# Patient Record
Sex: MALE | Race: WHITE | Employment: OTHER | URBAN - METROPOLITAN AREA
[De-identification: names, ages, dates, MRNs, and addresses within clinical notes are randomized per-mention and may not be internally consistent; named-entity substitution may affect disease eponyms.]

---

## 2019-10-08 ENCOUNTER — HOSPITAL ENCOUNTER (OUTPATIENT)
Dept: GENERAL RADIOLOGY | Age: 84
Discharge: HOME | End: 2019-10-08
Attending: INTERNAL MEDICINE
Payer: MEDICARE

## 2019-10-08 DIAGNOSIS — R06.02 SHORTNESS OF BREATH: ICD-10-CM

## 2019-10-08 PROCEDURE — 71046 X-RAY EXAM CHEST 2 VIEWS: CPT

## 2019-11-27 ENCOUNTER — APPOINTMENT (OUTPATIENT)
Dept: GENERAL RADIOLOGY | Age: 84
DRG: 602 | End: 2019-11-27
Attending: EMERGENCY MEDICINE
Payer: MEDICARE

## 2019-11-27 ENCOUNTER — APPOINTMENT (OUTPATIENT)
Dept: CT IMAGING | Age: 84
DRG: 602 | End: 2019-11-27
Attending: EMERGENCY MEDICINE
Payer: MEDICARE

## 2019-11-27 ENCOUNTER — HOSPITAL ENCOUNTER (INPATIENT)
Age: 84
LOS: 17 days | Discharge: NURSING, REHAB OR LONG-TERM CARE | DRG: 602 | End: 2019-12-14
Attending: EMERGENCY MEDICINE | Admitting: HOSPITALIST
Payer: MEDICARE

## 2019-11-27 DIAGNOSIS — Z71.89 ADVANCED CARE PLANNING/COUNSELING DISCUSSION: ICD-10-CM

## 2019-11-27 DIAGNOSIS — M25.421 EFFUSION OF RIGHT ELBOW: ICD-10-CM

## 2019-11-27 DIAGNOSIS — G93.40 ACUTE ENCEPHALOPATHY: Primary | ICD-10-CM

## 2019-11-27 DIAGNOSIS — R53.81 DEBILITY: ICD-10-CM

## 2019-11-27 DIAGNOSIS — R41.82 ALTERED MENTAL STATUS, UNSPECIFIED ALTERED MENTAL STATUS TYPE: ICD-10-CM

## 2019-11-27 DIAGNOSIS — Z71.89 GOALS OF CARE, COUNSELING/DISCUSSION: ICD-10-CM

## 2019-11-27 PROBLEM — L03.113 CELLULITIS OF RIGHT ARM: Status: ACTIVE | Noted: 2019-11-27

## 2019-11-27 LAB
ALBUMIN SERPL-MCNC: 3.3 G/DL (ref 3.5–5)
ALBUMIN/GLOB SERPL: 0.9 {RATIO} (ref 1.1–2.2)
ALP SERPL-CCNC: 82 U/L (ref 45–117)
ALT SERPL-CCNC: 22 U/L (ref 12–78)
ANION GAP SERPL CALC-SCNC: 5 MMOL/L (ref 5–15)
APPEARANCE FLD: ABNORMAL
APPEARANCE UR: ABNORMAL
AST SERPL-CCNC: 18 U/L (ref 15–37)
BACTERIA URNS QL MICRO: NEGATIVE /HPF
BASOPHILS # BLD: 0 K/UL (ref 0–0.1)
BASOPHILS NFR BLD: 0 % (ref 0–1)
BILIRUB SERPL-MCNC: 0.9 MG/DL (ref 0.2–1)
BILIRUB UR QL: NEGATIVE
BODY FLD TYPE: NORMAL
BUN SERPL-MCNC: 21 MG/DL (ref 6–20)
BUN/CREAT SERPL: 19 (ref 12–20)
CALCIUM SERPL-MCNC: 9.8 MG/DL (ref 8.5–10.1)
CHLORIDE SERPL-SCNC: 98 MMOL/L (ref 97–108)
CO2 SERPL-SCNC: 34 MMOL/L (ref 21–32)
COLOR FLD: ABNORMAL
COLOR UR: ABNORMAL
CREAT SERPL-MCNC: 1.13 MG/DL (ref 0.7–1.3)
CRP SERPL-MCNC: 8.41 MG/DL (ref 0–0.6)
CRYSTALS FLD MICRO: NORMAL
DIFFERENTIAL METHOD BLD: ABNORMAL
EOSINOPHIL # BLD: 0 K/UL (ref 0–0.4)
EOSINOPHIL NFR BLD: 0 % (ref 0–7)
EPITH CASTS URNS QL MICRO: ABNORMAL /LPF
ERYTHROCYTE [DISTWIDTH] IN BLOOD BY AUTOMATED COUNT: 13.2 % (ref 11.5–14.5)
ERYTHROCYTE [SEDIMENTATION RATE] IN BLOOD: 18 MM/HR (ref 0–20)
GLOBULIN SER CALC-MCNC: 3.6 G/DL (ref 2–4)
GLUCOSE SERPL-MCNC: 122 MG/DL (ref 65–100)
GLUCOSE UR STRIP.AUTO-MCNC: NEGATIVE MG/DL
HCT VFR BLD AUTO: 43.3 % (ref 36.6–50.3)
HGB BLD-MCNC: 13.4 G/DL (ref 12.1–17)
HGB UR QL STRIP: ABNORMAL
HYALINE CASTS URNS QL MICRO: ABNORMAL /LPF (ref 0–5)
IMM GRANULOCYTES # BLD AUTO: 0 K/UL (ref 0–0.04)
IMM GRANULOCYTES NFR BLD AUTO: 0 % (ref 0–0.5)
INR BLD: 2.7 (ref 0.9–1.2)
KETONES UR QL STRIP.AUTO: ABNORMAL MG/DL
LACTATE BLD-SCNC: 1.2 MMOL/L (ref 0.4–2)
LEUKOCYTE ESTERASE UR QL STRIP.AUTO: ABNORMAL
LYMPHOCYTES # BLD: 1 K/UL (ref 0.8–3.5)
LYMPHOCYTES NFR BLD: 8 % (ref 12–49)
LYMPHOCYTES NFR FLD: 2 %
MCH RBC QN AUTO: 30.5 PG (ref 26–34)
MCHC RBC AUTO-ENTMCNC: 30.9 G/DL (ref 30–36.5)
MCV RBC AUTO: 98.6 FL (ref 80–99)
MONOCYTES # BLD: 1.4 K/UL (ref 0–1)
MONOCYTES NFR BLD: 10 % (ref 5–13)
NEUTROPHILS NFR FLD: 98 %
NEUTS SEG # BLD: 11 K/UL (ref 1.8–8)
NEUTS SEG NFR BLD: 82 % (ref 32–75)
NITRITE UR QL STRIP.AUTO: NEGATIVE
NRBC # BLD: 0 K/UL (ref 0–0.01)
NRBC BLD-RTO: 0 PER 100 WBC
NUC CELL # FLD: ABNORMAL /CU MM
PH UR STRIP: 6 [PH] (ref 5–8)
PLATELET # BLD AUTO: 219 K/UL (ref 150–400)
PMV BLD AUTO: 10.5 FL (ref 8.9–12.9)
POTASSIUM SERPL-SCNC: 4 MMOL/L (ref 3.5–5.1)
PROT SERPL-MCNC: 6.9 G/DL (ref 6.4–8.2)
PROT UR STRIP-MCNC: 100 MG/DL
RBC # BLD AUTO: 4.39 M/UL (ref 4.1–5.7)
RBC # FLD: >100 /CU MM
RBC #/AREA URNS HPF: >100 /HPF (ref 0–5)
SODIUM SERPL-SCNC: 137 MMOL/L (ref 136–145)
SP GR UR REFRACTOMETRY: 1.02 (ref 1–1.03)
SPECIMEN SOURCE FLD: ABNORMAL
TROPONIN I SERPL-MCNC: <0.05 NG/ML
UROBILINOGEN UR QL STRIP.AUTO: 1 EU/DL (ref 0.2–1)
WBC # BLD AUTO: 13.4 K/UL (ref 4.1–11.1)
WBC URNS QL MICRO: >100 /HPF (ref 0–4)

## 2019-11-27 PROCEDURE — 99285 EMERGENCY DEPT VISIT HI MDM: CPT

## 2019-11-27 PROCEDURE — 74011000250 HC RX REV CODE- 250: Performed by: PHYSICIAN ASSISTANT

## 2019-11-27 PROCEDURE — 74011250636 HC RX REV CODE- 250/636: Performed by: HOSPITALIST

## 2019-11-27 PROCEDURE — 84484 ASSAY OF TROPONIN QUANT: CPT

## 2019-11-27 PROCEDURE — 51701 INSERT BLADDER CATHETER: CPT

## 2019-11-27 PROCEDURE — 83605 ASSAY OF LACTIC ACID: CPT

## 2019-11-27 PROCEDURE — 96365 THER/PROPH/DIAG IV INF INIT: CPT

## 2019-11-27 PROCEDURE — 0R9L3ZX DRAINAGE OF RIGHT ELBOW JOINT, PERCUTANEOUS APPROACH, DIAGNOSTIC: ICD-10-PCS | Performed by: ORTHOPAEDIC SURGERY

## 2019-11-27 PROCEDURE — 73080 X-RAY EXAM OF ELBOW: CPT

## 2019-11-27 PROCEDURE — 36415 COLL VENOUS BLD VENIPUNCTURE: CPT

## 2019-11-27 PROCEDURE — 65270000029 HC RM PRIVATE

## 2019-11-27 PROCEDURE — 87040 BLOOD CULTURE FOR BACTERIA: CPT

## 2019-11-27 PROCEDURE — 74011000258 HC RX REV CODE- 258: Performed by: EMERGENCY MEDICINE

## 2019-11-27 PROCEDURE — 85025 COMPLETE CBC W/AUTO DIFF WBC: CPT

## 2019-11-27 PROCEDURE — 74011250637 HC RX REV CODE- 250/637: Performed by: HOSPITALIST

## 2019-11-27 PROCEDURE — 86140 C-REACTIVE PROTEIN: CPT

## 2019-11-27 PROCEDURE — 87205 SMEAR GRAM STAIN: CPT

## 2019-11-27 PROCEDURE — 87086 URINE CULTURE/COLONY COUNT: CPT

## 2019-11-27 PROCEDURE — 71046 X-RAY EXAM CHEST 2 VIEWS: CPT

## 2019-11-27 PROCEDURE — 70450 CT HEAD/BRAIN W/O DYE: CPT

## 2019-11-27 PROCEDURE — 74011250636 HC RX REV CODE- 250/636: Performed by: EMERGENCY MEDICINE

## 2019-11-27 PROCEDURE — 74011000250 HC RX REV CODE- 250: Performed by: EMERGENCY MEDICINE

## 2019-11-27 PROCEDURE — 85652 RBC SED RATE AUTOMATED: CPT

## 2019-11-27 PROCEDURE — 93005 ELECTROCARDIOGRAM TRACING: CPT

## 2019-11-27 PROCEDURE — 80053 COMPREHEN METABOLIC PANEL: CPT

## 2019-11-27 PROCEDURE — 89050 BODY FLUID CELL COUNT: CPT

## 2019-11-27 PROCEDURE — 85610 PROTHROMBIN TIME: CPT

## 2019-11-27 PROCEDURE — 20605 DRAIN/INJ JOINT/BURSA W/O US: CPT

## 2019-11-27 PROCEDURE — 89060 EXAM SYNOVIAL FLUID CRYSTALS: CPT

## 2019-11-27 PROCEDURE — 81001 URINALYSIS AUTO W/SCOPE: CPT

## 2019-11-27 RX ORDER — THERA TABS 400 MCG
1 TAB ORAL DAILY
COMMUNITY

## 2019-11-27 RX ORDER — ALBUTEROL SULFATE 0.83 MG/ML
2.5 SOLUTION RESPIRATORY (INHALATION)
COMMUNITY

## 2019-11-27 RX ORDER — METOPROLOL TARTRATE 50 MG/1
50 TABLET ORAL 2 TIMES DAILY
Status: DISCONTINUED | OUTPATIENT
Start: 2019-11-27 | End: 2019-12-07

## 2019-11-27 RX ORDER — FUROSEMIDE 20 MG/1
20 TABLET ORAL DAILY
Status: ON HOLD | COMMUNITY
End: 2019-12-13 | Stop reason: SDUPTHER

## 2019-11-27 RX ORDER — LANOLIN ALCOHOL/MO/W.PET/CERES
100 CREAM (GRAM) TOPICAL DAILY
COMMUNITY

## 2019-11-27 RX ORDER — SODIUM CHLORIDE 0.9 % (FLUSH) 0.9 %
5-40 SYRINGE (ML) INJECTION AS NEEDED
Status: DISCONTINUED | OUTPATIENT
Start: 2019-11-27 | End: 2019-12-14 | Stop reason: HOSPADM

## 2019-11-27 RX ORDER — ONDANSETRON 2 MG/ML
4 INJECTION INTRAMUSCULAR; INTRAVENOUS
Status: DISCONTINUED | OUTPATIENT
Start: 2019-11-27 | End: 2019-12-14 | Stop reason: HOSPADM

## 2019-11-27 RX ORDER — VANCOMYCIN 2 GRAM/500 ML IN 0.9 % SODIUM CHLORIDE INTRAVENOUS
2000 ONCE
Status: COMPLETED | OUTPATIENT
Start: 2019-11-27 | End: 2019-11-27

## 2019-11-27 RX ORDER — DILTIAZEM HYDROCHLORIDE 30 MG/1
30 TABLET, FILM COATED ORAL
COMMUNITY

## 2019-11-27 RX ORDER — LIDOCAINE HYDROCHLORIDE 20 MG/ML
JELLY TOPICAL ONCE
Status: COMPLETED | OUTPATIENT
Start: 2019-11-27 | End: 2019-11-27

## 2019-11-27 RX ORDER — LIDOCAINE HYDROCHLORIDE 10 MG/ML
5 INJECTION INFILTRATION; PERINEURAL ONCE
Status: COMPLETED | OUTPATIENT
Start: 2019-11-27 | End: 2019-11-27

## 2019-11-27 RX ORDER — ASPIRIN 81 MG/1
81 TABLET ORAL DAILY
Status: DISCONTINUED | OUTPATIENT
Start: 2019-11-28 | End: 2019-12-14 | Stop reason: HOSPADM

## 2019-11-27 RX ORDER — FUROSEMIDE 20 MG/1
20 TABLET ORAL DAILY
Status: DISCONTINUED | OUTPATIENT
Start: 2019-11-28 | End: 2019-12-07

## 2019-11-27 RX ORDER — ALBUTEROL SULFATE 0.83 MG/ML
2.5 SOLUTION RESPIRATORY (INHALATION)
Status: DISCONTINUED | OUTPATIENT
Start: 2019-11-27 | End: 2019-11-29

## 2019-11-27 RX ORDER — LISINOPRIL 5 MG/1
5 TABLET ORAL DAILY
Status: DISCONTINUED | OUTPATIENT
Start: 2019-11-28 | End: 2019-11-28

## 2019-11-27 RX ORDER — ATORVASTATIN CALCIUM 40 MG/1
40 TABLET, FILM COATED ORAL DAILY
COMMUNITY

## 2019-11-27 RX ORDER — LANOLIN ALCOHOL/MO/W.PET/CERES
1000 CREAM (GRAM) TOPICAL DAILY
COMMUNITY

## 2019-11-27 RX ORDER — ACETAMINOPHEN 325 MG/1
650 TABLET ORAL
COMMUNITY

## 2019-11-27 RX ORDER — LANOLIN ALCOHOL/MO/W.PET/CERES
100 CREAM (GRAM) TOPICAL DAILY
Status: DISCONTINUED | OUTPATIENT
Start: 2019-11-28 | End: 2019-12-14 | Stop reason: HOSPADM

## 2019-11-27 RX ORDER — ATORVASTATIN CALCIUM 40 MG/1
40 TABLET, FILM COATED ORAL DAILY
Status: DISCONTINUED | OUTPATIENT
Start: 2019-11-28 | End: 2019-12-14 | Stop reason: HOSPADM

## 2019-11-27 RX ORDER — ALBUTEROL SULFATE 90 UG/1
AEROSOL, METERED RESPIRATORY (INHALATION)
COMMUNITY

## 2019-11-27 RX ORDER — ACETAMINOPHEN 325 MG/1
650 TABLET ORAL
Status: DISCONTINUED | OUTPATIENT
Start: 2019-11-27 | End: 2019-12-14 | Stop reason: HOSPADM

## 2019-11-27 RX ORDER — VANCOMYCIN HYDROCHLORIDE
1250
Status: DISCONTINUED | OUTPATIENT
Start: 2019-11-28 | End: 2019-11-30

## 2019-11-27 RX ORDER — WARFARIN 4 MG/1
4 TABLET ORAL DAILY
COMMUNITY

## 2019-11-27 RX ORDER — METOPROLOL TARTRATE 50 MG/1
50 TABLET ORAL 2 TIMES DAILY
COMMUNITY

## 2019-11-27 RX ORDER — CHOLECALCIFEROL (VITAMIN D3) 125 MCG
5000 CAPSULE ORAL DAILY
COMMUNITY

## 2019-11-27 RX ORDER — DILTIAZEM HYDROCHLORIDE 30 MG/1
30 TABLET, FILM COATED ORAL
Status: DISCONTINUED | OUTPATIENT
Start: 2019-11-28 | End: 2019-12-07

## 2019-11-27 RX ORDER — SODIUM CHLORIDE 0.9 % (FLUSH) 0.9 %
5-40 SYRINGE (ML) INJECTION EVERY 8 HOURS
Status: DISCONTINUED | OUTPATIENT
Start: 2019-11-27 | End: 2019-12-14 | Stop reason: HOSPADM

## 2019-11-27 RX ORDER — ASPIRIN 81 MG/1
TABLET ORAL DAILY
COMMUNITY

## 2019-11-27 RX ORDER — LISINOPRIL 5 MG/1
TABLET ORAL DAILY
Status: ON HOLD | COMMUNITY
End: 2019-12-13 | Stop reason: SDUPTHER

## 2019-11-27 RX ADMIN — LIDOCAINE HYDROCHLORIDE: 20 JELLY TOPICAL at 18:37

## 2019-11-27 RX ADMIN — SODIUM CHLORIDE 1000 ML: 900 INJECTION, SOLUTION INTRAVENOUS at 17:34

## 2019-11-27 RX ADMIN — VANCOMYCIN HYDROCHLORIDE 2000 MG: 10 INJECTION, POWDER, LYOPHILIZED, FOR SOLUTION INTRAVENOUS at 21:50

## 2019-11-27 RX ADMIN — ACETAMINOPHEN 650 MG: 325 TABLET ORAL at 21:50

## 2019-11-27 RX ADMIN — LIDOCAINE HYDROCHLORIDE 5 ML: 10 INJECTION, SOLUTION INFILTRATION; PERINEURAL at 19:44

## 2019-11-27 RX ADMIN — METOPROLOL TARTRATE 50 MG: 50 TABLET, FILM COATED ORAL at 21:50

## 2019-11-27 RX ADMIN — CEFTRIAXONE 1 G: 1 INJECTION, POWDER, FOR SOLUTION INTRAMUSCULAR; INTRAVENOUS at 18:49

## 2019-11-27 RX ADMIN — Medication 10 ML: at 21:55

## 2019-11-27 NOTE — ED NOTES
Attempted to straight cath patient was unsuccessful. Son wishes that we wait an hour to see if patient is able to void.

## 2019-11-27 NOTE — ED PROVIDER NOTES
81 y.o. male with no significant past medical history who presents from nursing home via EMS with chief complaint of Altered Mental Status. Pt presents with AMS since 0730 this morning per EMS. Per EMS, Pt was more confused and not following his usual routine as noticed by his daytime nurse. Pt was sent to [address] [place] [address],[address], who diagnosed him with a UTI and cellulitis to the R arm, but referred Pt to the ED due to his AMS. Pt denies any fever, chills, nausea, vomiting, SOB, chest pain, abdominal pain, urinary symptoms, changes in BMs, weakness, or injury to his right arm. There are no other acute medical concerns at this time. PCP: Kyra Arteaga MD    Full history, physical exam, and ROS unable to be obtained due to:  confusion. Note written by Sally Sahu, as dictated by Aminta Ordonez MD 2:53 PM      The history is provided by the patient and the EMS personnel. The history is limited by the condition of the patient. No past medical history on file. No past surgical history on file. No family history on file.     Social History     Socioeconomic History    Marital status:      Spouse name: Not on file    Number of children: Not on file    Years of education: Not on file    Highest education level: Not on file   Occupational History    Not on file   Social Needs    Financial resource strain: Not on file    Food insecurity:     Worry: Not on file     Inability: Not on file    Transportation needs:     Medical: Not on file     Non-medical: Not on file   Tobacco Use    Smoking status: Not on file   Substance and Sexual Activity    Alcohol use: Not on file    Drug use: Not on file    Sexual activity: Not on file   Lifestyle    Physical activity:     Days per week: Not on file     Minutes per session: Not on file    Stress: Not on file   Relationships    Social connections:     Talks on phone: Not on file     Gets together: Not on file     Attends [employer] service: Not on file     Active member of club or organization: Not on file     Attends meetings of clubs or organizations: Not on file     Relationship status: Not on file    Intimate partner violence:     Fear of current or ex partner: Not on file     Emotionally abused: Not on file     Physically abused: Not on file     Forced sexual activity: Not on file   Other Topics Concern    Not on file   Social History Narrative    Not on file         ALLERGIES: Seroquel [quetiapine]    Review of Systems   Unable to perform ROS: Mental status change       There were no vitals filed for this visit. Physical Exam   Vital signs reviewed. Nursing notes reviewed.     Const:  No acute distress, well developed, well nourished  Head:  Atraumatic, normocephalic  Eyes:  PERRL, conjunctiva normal, no scleral icterus  Neck:  Supple, trachea midline  Cardiovascular:  RRR, no murmurs, no gallops, no rubs, mild bilateral pitting edema  Resp:  No resp distress, no increased work of breathing, no wheezes, no rhonchi, no rales,  Abd:  Soft, non-tender, non-distended, no rebound, no guarding, no CVA tenderness  MSK:  No pedal edema, normal ROM, swelling in right elbow and tenderness to palpation; no pain with ROM in right elbow, can extend right arm to 120 degrees  Neuro:  Alert and oriented x1 - knows name, no cranial nerve defect; 4+/5 strength in right upper extremity, 5/5 in left upper extremity and bilateral lower extremities  Skin:  Warm, dry, intact  Psych: normal mood and affect, behavior is normal, judgement and thought content is normal, word-finding difficulties  Note written by Sally Grider, as dictated by Gaye Grande MD 2:53 PM      MDM  Number of Diagnoses or Management Options  Acute encephalopathy:   Effusion of right elbow:      Amount and/or Complexity of Data Reviewed  Clinical lab tests: ordered and reviewed  Tests in the radiology section of CPT®: ordered and reviewed  Review and summarize past medical records: yes    Patient Progress  Patient progress: stable         Procedures  ED EKG interpretation:  Rhythm: atrial fib; and regular . Rate (approx.): 83; Axis: left axis deviation; ST/T wave: non-specific changes  Note written by Sally Grider, as dictated by Gaye Grande MD 3:17 PM    8:54 PM  Patient is being admitted to the hospital.  The results of their tests and reasons for their admission have been discussed with them and/or available family. They convey agreement and understanding for the need to be admitted and for their admission diagnosis. Mr Jessica Leo is an 55-year-old male who presents to the ER with he was diagnosed with probable UTI recently. He also has an effusion at his elbow. Concerned about possible septic arthritis. Orthopedics have evaluated him and tapped his elbow. I have started him on antibiotics for possible urinary tract infection. He is having difficulty giving us urine in the ER. We have tried unsuccessfully twice to do straight catheterization. He has refused any other further catheterizations.   He is to be evaluated for admission by the hospitalist.

## 2019-11-27 NOTE — ED NOTES
Attempted to straight cath patient again with coude and this RN and additional RN was not successful. MD made aware.

## 2019-11-27 NOTE — ED TRIAGE NOTES
Triage: Patient arrives via EMS with c/o AMS that was notice this morning at 0730. Staff member became more concerned when he wasn't following his normal routine. Patient is confused at baseline but appears more altered. Patient seen by dispatch [facility] and they recommended he be seen here due to the acute AMS. Patient was diagnosed with a UTI and cellulitis of the right arm by Dispatch [facility].

## 2019-11-28 LAB
ANION GAP SERPL CALC-SCNC: 3 MMOL/L (ref 5–15)
ATRIAL RATE: 326 BPM
BUN SERPL-MCNC: 17 MG/DL (ref 6–20)
BUN/CREAT SERPL: 19 (ref 12–20)
CALCIUM SERPL-MCNC: 9.3 MG/DL (ref 8.5–10.1)
CALCULATED R AXIS, ECG10: -39 DEGREES
CALCULATED T AXIS, ECG11: 44 DEGREES
CHLORIDE SERPL-SCNC: 102 MMOL/L (ref 97–108)
CO2 SERPL-SCNC: 34 MMOL/L (ref 21–32)
CREAT SERPL-MCNC: 0.91 MG/DL (ref 0.7–1.3)
DIAGNOSIS, 93000: NORMAL
GLUCOSE SERPL-MCNC: 91 MG/DL (ref 65–100)
INR PPP: 2.6 (ref 0.9–1.1)
POTASSIUM SERPL-SCNC: 4.2 MMOL/L (ref 3.5–5.1)
PROTHROMBIN TIME: 25 SEC (ref 9–11.1)
Q-T INTERVAL, ECG07: 366 MS
QRS DURATION, ECG06: 84 MS
QTC CALCULATION (BEZET), ECG08: 430 MS
SODIUM SERPL-SCNC: 139 MMOL/L (ref 136–145)
VENTRICULAR RATE, ECG03: 83 BPM

## 2019-11-28 PROCEDURE — 77030029684 HC NEB SM VOL KT MONA -A

## 2019-11-28 PROCEDURE — 65270000029 HC RM PRIVATE

## 2019-11-28 PROCEDURE — 74011000258 HC RX REV CODE- 258: Performed by: HOSPITALIST

## 2019-11-28 PROCEDURE — 80048 BASIC METABOLIC PNL TOTAL CA: CPT

## 2019-11-28 PROCEDURE — 74011250636 HC RX REV CODE- 250/636: Performed by: INTERNAL MEDICINE

## 2019-11-28 PROCEDURE — 74011000250 HC RX REV CODE- 250: Performed by: HOSPITALIST

## 2019-11-28 PROCEDURE — 85610 PROTHROMBIN TIME: CPT

## 2019-11-28 PROCEDURE — 74011250637 HC RX REV CODE- 250/637: Performed by: HOSPITALIST

## 2019-11-28 PROCEDURE — 94640 AIRWAY INHALATION TREATMENT: CPT

## 2019-11-28 PROCEDURE — 74011250637 HC RX REV CODE- 250/637: Performed by: INTERNAL MEDICINE

## 2019-11-28 PROCEDURE — 36415 COLL VENOUS BLD VENIPUNCTURE: CPT

## 2019-11-28 PROCEDURE — 74011250636 HC RX REV CODE- 250/636: Performed by: HOSPITALIST

## 2019-11-28 RX ORDER — TRAMADOL HYDROCHLORIDE 50 MG/1
50 TABLET ORAL
Status: DISCONTINUED | OUTPATIENT
Start: 2019-11-28 | End: 2019-12-14 | Stop reason: HOSPADM

## 2019-11-28 RX ORDER — SODIUM CHLORIDE 9 MG/ML
125 INJECTION, SOLUTION INTRAVENOUS CONTINUOUS
Status: DISCONTINUED | OUTPATIENT
Start: 2019-11-28 | End: 2019-12-03

## 2019-11-28 RX ORDER — HALOPERIDOL 5 MG/ML
5 INJECTION INTRAMUSCULAR ONCE
Status: COMPLETED | OUTPATIENT
Start: 2019-11-28 | End: 2019-11-28

## 2019-11-28 RX ORDER — HYDRALAZINE HYDROCHLORIDE 20 MG/ML
20 INJECTION INTRAMUSCULAR; INTRAVENOUS
Status: DISCONTINUED | OUTPATIENT
Start: 2019-11-28 | End: 2019-12-14 | Stop reason: HOSPADM

## 2019-11-28 RX ORDER — IPRATROPIUM BROMIDE AND ALBUTEROL SULFATE 2.5; .5 MG/3ML; MG/3ML
3 SOLUTION RESPIRATORY (INHALATION)
Status: COMPLETED | OUTPATIENT
Start: 2019-11-28 | End: 2019-11-28

## 2019-11-28 RX ORDER — AMLODIPINE BESYLATE 5 MG/1
5 TABLET ORAL DAILY
Status: DISCONTINUED | OUTPATIENT
Start: 2019-11-28 | End: 2019-12-08 | Stop reason: ALTCHOICE

## 2019-11-28 RX ORDER — LISINOPRIL 10 MG/1
10 TABLET ORAL DAILY
Status: DISCONTINUED | OUTPATIENT
Start: 2019-11-28 | End: 2019-12-14 | Stop reason: HOSPADM

## 2019-11-28 RX ORDER — FENTANYL CITRATE 50 UG/ML
25 INJECTION, SOLUTION INTRAMUSCULAR; INTRAVENOUS
Status: DISCONTINUED | OUTPATIENT
Start: 2019-11-28 | End: 2019-12-06

## 2019-11-28 RX ORDER — WARFARIN 4 MG/1
4 TABLET ORAL ONCE
Status: COMPLETED | OUTPATIENT
Start: 2019-11-28 | End: 2019-11-28

## 2019-11-28 RX ADMIN — WARFARIN SODIUM 4 MG: 4 TABLET ORAL at 17:02

## 2019-11-28 RX ADMIN — CEFTRIAXONE 1 G: 1 INJECTION, POWDER, FOR SOLUTION INTRAMUSCULAR; INTRAVENOUS at 18:58

## 2019-11-28 RX ADMIN — DILTIAZEM HYDROCHLORIDE 30 MG: 30 TABLET, FILM COATED ORAL at 08:46

## 2019-11-28 RX ADMIN — ATORVASTATIN CALCIUM 40 MG: 40 TABLET, FILM COATED ORAL at 08:43

## 2019-11-28 RX ADMIN — TRAMADOL HYDROCHLORIDE 50 MG: 50 TABLET, FILM COATED ORAL at 11:12

## 2019-11-28 RX ADMIN — Medication 100 MG: at 08:43

## 2019-11-28 RX ADMIN — Medication 10 ML: at 06:00

## 2019-11-28 RX ADMIN — FENTANYL CITRATE 25 MCG: 50 INJECTION INTRAMUSCULAR; INTRAVENOUS at 01:43

## 2019-11-28 RX ADMIN — METOPROLOL TARTRATE 50 MG: 50 TABLET, FILM COATED ORAL at 08:43

## 2019-11-28 RX ADMIN — VANCOMYCIN HYDROCHLORIDE 1250 MG: 10 INJECTION, POWDER, LYOPHILIZED, FOR SOLUTION INTRAVENOUS at 15:37

## 2019-11-28 RX ADMIN — AMLODIPINE BESYLATE 5 MG: 5 TABLET ORAL at 08:46

## 2019-11-28 RX ADMIN — IPRATROPIUM BROMIDE AND ALBUTEROL SULFATE 3 ML: .5; 3 SOLUTION RESPIRATORY (INHALATION) at 02:26

## 2019-11-28 RX ADMIN — SODIUM CHLORIDE 125 ML/HR: 900 INJECTION, SOLUTION INTRAVENOUS at 13:00

## 2019-11-28 RX ADMIN — METOPROLOL TARTRATE 50 MG: 50 TABLET, FILM COATED ORAL at 17:00

## 2019-11-28 RX ADMIN — HALOPERIDOL LACTATE 5 MG: 5 INJECTION INTRAMUSCULAR at 17:56

## 2019-11-28 RX ADMIN — DILTIAZEM HYDROCHLORIDE 30 MG: 30 TABLET, FILM COATED ORAL at 16:43

## 2019-11-28 RX ADMIN — TRAMADOL HYDROCHLORIDE 50 MG: 50 TABLET, FILM COATED ORAL at 17:05

## 2019-11-28 RX ADMIN — SODIUM CHLORIDE 125 ML/HR: 900 INJECTION, SOLUTION INTRAVENOUS at 23:39

## 2019-11-28 RX ADMIN — DILTIAZEM HYDROCHLORIDE 30 MG: 30 TABLET, FILM COATED ORAL at 11:11

## 2019-11-28 RX ADMIN — FUROSEMIDE 20 MG: 20 TABLET ORAL at 08:43

## 2019-11-28 RX ADMIN — ASPIRIN 81 MG: 81 TABLET, COATED ORAL at 08:43

## 2019-11-28 RX ADMIN — LISINOPRIL 10 MG: 10 TABLET ORAL at 08:43

## 2019-11-28 NOTE — H&P
HISTORY AND PHYSICAL      PCP: Jovany Chu MD  History source: the patient's son      CC: altered mental state      HPI: 81 y.o man w/ afib, CVA, HTN, COPD, probable dementia, nursing home resident, who presents with AMS. He was reportedly more confused than baseline during the day. [facility] and was diagnosed with a UTI based on a urine dipstick and cellulitis of the right arm. He was sent to the ED due to the increase in confusion. His son describes that he is confused at baseline and the severity is variable, and that he is back at baseline currently. He has not been formally diagnosed with dementia. No known fever or pain. The patient denies any complaints but he is a poor historian. PMH/PSH:  Past Medical History:   Diagnosis Date    Atrial fibrillation [address] [address]     Chronic obstructive pulmonary disease ([facility] [address]     Hypertension    CVA  HTN    History reviewed. No pertinent surgical history. Home meds:   Prior to Admission medications    Medication Sig Start Date End Date Taking? Authorizing Provider   aspirin delayed-release 81 mg tablet Take  by mouth daily. Yes Shanthi, MD David   atorvastatin (LIPITOR) 40 mg tablet Take 40 mg by mouth daily. At bedtime   Yes Shanthi, MD David   dilTIAZem (CARDIZEM) 30 mg tablet Take 30 mg by mouth. Every 8 hours for HTN   Yes David Maki MD   furosemide (LASIX) 20 mg tablet Take 20 mg by mouth daily. Yes David Maki MD   lisinopril (PRINIVIL, ZESTRIL) 5 mg tablet Take  by mouth daily. Yes David Maki MD   loratadine 10 mg cap Take  by mouth. Yes David Maki MD   metoprolol tartrate (LOPRESSOR) 50 mg tablet Take 50 mg by mouth two (2) times a day. Yes Shanthi, MD David   guaiFENesin (MUCINEX) 1,200 mg Ta12 ER tablet Take 1,200 mg by mouth two (2) times a day. PRN cough and congestion   Yes David Maki MD   thiamine HCL (B-1) 100 mg tablet Take 100 mg by mouth daily.    Yes David Maki MD   cyanocobalamin (VITAMIN B-12) 1,000 mcg tablet Take 1,000 mcg by mouth daily. Yes Shanthi, MD David   cholecalciferol, vitamin D3, (VITAMIN D3) 2,000 unit tab Take 5,000 Units by mouth daily. Yes Shanthi, MD David   warfarin (COUMADIN) 4 mg tablet Take 4 mg by mouth daily. Yes Other, MD David   albuterol (PROVENTIL VENTOLIN) 2.5 mg /3 mL (0.083 %) nebu 2.5 mg by Nebulization route. BID for shortness of breath   Yes Shanthi, MD David   albuterol (VENTOLIN HFA) 90 mcg/actuation inhaler Take  by inhalation every four (4) hours as needed for Wheezing. Yes Shanthi, MD David   acetaminophen (TYLENOL) 325 mg tablet Take 650 mg by mouth every four (4) hours as needed for Pain. Yes Shanthi, MD David       Allergies: Allergies   Allergen Reactions    Seroquel [Quetiapine] Unknown (comments)       FH:  History reviewed. No pertinent family history. SH:  Social History     Tobacco Use    Smoking status: Former Smoker    Smokeless tobacco: Never Used   Substance Use Topics    Alcohol use: Not Currently       ROS: Review of systems not obtained due to patient factors.       PHYSICAL EXAM:  Visit Vitals  /85   Pulse 86   Resp 25   SpO2 99%       Gen: NAD, non-toxic  HEENT: anicteric sclerae, normal conjunctiva, oropharynx clear, MM moist  Neck: supple, trachea midline, no adenopathy  Heart: irreg irreg, no MRG, no JVD, no peripheral edema  Lungs: CTA b/l other than occasional end-expiratory wheeze, non-labored respirations on O2  Abd: soft, NT, ND, BS+  Extr: warm  Skin: dry, R elbow wrapped, there is surrounding erythema and warmth  Neuro: CN II-XII grossly intact, normal speech, moves all extremities  Psych: normal mood, appropriate affect, pleasantly confused      Labs/Imaging:  Recent Results (from the past 24 hour(s))   EKG, 12 LEAD, INITIAL    Collection Time: 11/27/19  2:59 PM   Result Value Ref Range    Ventricular Rate 83 BPM    Atrial Rate 326 BPM    QRS Duration 84 ms    Q-T Interval 366 ms    QTC Calculation (Bezet) 430 ms    Calculated R Axis -39 degrees    Calculated T Axis 44 degrees    Diagnosis       Atrial fibrillation  Left axis deviation  No previous ECGs available     CBC WITH AUTOMATED DIFF    Collection Time: 11/27/19  3:20 PM   Result Value Ref Range    WBC 13.4 (H) 4.1 - 11.1 K/uL    RBC 4.39 4. 10 - 5.70 M/uL    HGB 13.4 12.1 - 17.0 g/dL    HCT 43.3 36.6 - 50.3 %    MCV 98.6 80.0 - 99.0 FL    MCH 30.5 26.0 - 34.0 PG    MCHC 30.9 30.0 - 36.5 g/dL    RDW 13.2 11.5 - 14.5 %    PLATELET 281 522 - 950 K/uL    MPV 10.5 8.9 - 12.9 FL    NRBC 0.0 0  WBC    ABSOLUTE NRBC 0.00 0.00 - 0.01 K/uL    NEUTROPHILS 82 (H) 32 - 75 %    LYMPHOCYTES 8 (L) 12 - 49 %    MONOCYTES 10 5 - 13 %    EOSINOPHILS 0 0 - 7 %    BASOPHILS 0 0 - 1 %    IMMATURE GRANULOCYTES 0 0.0 - 0.5 %    ABS. NEUTROPHILS 11.0 (H) 1.8 - 8.0 K/UL    ABS. LYMPHOCYTES 1.0 0.8 - 3.5 K/UL    ABS. MONOCYTES 1.4 (H) 0.0 - 1.0 K/UL    ABS. EOSINOPHILS 0.0 0.0 - 0.4 K/UL    ABS. BASOPHILS 0.0 0.0 - 0.1 K/UL    ABS. IMM. GRANS. 0.0 0.00 - 0.04 K/UL    DF AUTOMATED     METABOLIC PANEL, COMPREHENSIVE    Collection Time: 11/27/19  3:20 PM   Result Value Ref Range    Sodium 137 136 - 145 mmol/L    Potassium 4.0 3.5 - 5.1 mmol/L    Chloride 98 97 - 108 mmol/L    CO2 34 (H) 21 - 32 mmol/L    Anion gap 5 5 - 15 mmol/L    Glucose 122 (H) 65 - 100 mg/dL    BUN 21 (H) 6 - 20 MG/DL    Creatinine 1.13 0.70 - 1.30 MG/DL    BUN/Creatinine ratio 19 12 - 20      GFR est AA >60 >60 ml/min/1.73m2    GFR est non-AA >60 >60 ml/min/1.73m2    Calcium 9.8 8.5 - 10.1 MG/DL    Bilirubin, total 0.9 0.2 - 1.0 MG/DL    ALT (SGPT) 22 12 - 78 U/L    AST (SGOT) 18 15 - 37 U/L    Alk.  phosphatase 82 45 - 117 U/L    Protein, total 6.9 6.4 - 8.2 g/dL    Albumin 3.3 (L) 3.5 - 5.0 g/dL    Globulin 3.6 2.0 - 4.0 g/dL    A-G Ratio 0.9 (L) 1.1 - 2.2     TROPONIN I    Collection Time: 11/27/19  3:20 PM   Result Value Ref Range    Troponin-I, Qt. <0.05 <0.05 ng/mL   C REACTIVE PROTEIN, QT    Collection Time: 11/27/19  3:20 PM Result Value Ref Range    C-Reactive protein 8.41 (H) 0.00 - 0.60 mg/dL   SED RATE (ESR)    Collection Time: 11/27/19  3:20 PM   Result Value Ref Range    Sed rate, automated 18 0 - 20 mm/hr   POC LACTIC ACID    Collection Time: 11/27/19  3:28 PM   Result Value Ref Range    Lactic Acid (POC) 1.20 0.40 - 2.00 mmol/L   POC INR    Collection Time: 11/27/19  7:42 PM   Result Value Ref Range    INR (POC) 2.7 (H) <1.2         Recent Labs     11/27/19  1520   WBC 13.4*   HGB 13.4   HCT 43.3        Recent Labs     11/27/19  1520      K 4.0   CL 98   CO2 34*   BUN 21*   CREA 1.13   *   CA 9.8     Recent Labs     11/27/19  1520   SGOT 18   ALT 22   AP 82   TBILI 0.9   TP 6.9   ALB 3.3*   GLOB 3.6       Recent Labs     11/27/19  1520   TROIQ <0.05       Recent Labs     11/27/19  1942   INR 2.7*        No results for input(s): PH, PCO2, PO2 in the last 72 hours. Xr Chest Pa Lat    Result Date: 11/27/2019  IMPRESSION: Small bilateral pleural effusions. Xr Elbow Rt Min 3 V    Result Date: 11/27/2019  IMPRESSION: Nonspecific joint effusion. No evidence of fracture or osteomyelitis. Ct Head Wo Cont    Result Date: 11/27/2019  Impression: 1. No evidence of acute infarct or intracranial hemorrhage. 2. Periventricular white matter disease is likely secondary to chronic small vessel ischemic changes. 3. Chronic left posterior parietal infarct. Assessment & Plan:     UTI: (POA) this is based on an outpatient urine dipstick that showed +++ leukocyte esterase and + nitrite. We have been unable to obtain a urine specimen here so far. -send urine specimen when provided. Already received IV ceftriaxone which will be continued    R arm cellulitis with R elbow effusion:  -s/p arthrocentesis by ortho. Studies sent.  D/w ortho keep NPO for now until cell counts return  -will place on IV vancomycin for now    AMS: likely metabolic encephalopathy on chronic dementia though seems to have resolved per the family.  Monitor    Chronic atrial fibrillation:  -continue diltiazem, metoprolol and warfarin    COPD w/ chronic hypoxic respiratory failure: stable  -continue O2  -PRN inhaled bronchodilators    HTN:  -continue lisinopril, diltiazem, metoprolol    History of alcohol abuse: remote, resolved per son    History of CVA:  -continue aspirin    [facility] in the [facility] per his son    DVT ppx: anticoagulated  Code status: DNR - son states he has a DDNR at home  Disposition: prior living arrangement with ready    Signed By: Ginny Antunez MD     November 27, 2019

## 2019-11-28 NOTE — PROGRESS NOTES
Pt groaning in pain and restless bp elevated. Call out to Dr. Marcelino Edwards for PRN Pain meds and or PRN  blood pressure meds.

## 2019-11-28 NOTE — PROGRESS NOTES
Pt with purse lip breathing and using accessory muscles respiratory rate is 25. Oxygen saturation on 2 liters of oxygen is 96%.  Call out to Dr Angelica Castellanos to make him aware

## 2019-11-28 NOTE — ED NOTES
TRANSFER - OUT REPORT:    Verbal report given to Floor RN(name) on Jose Bangura  being transferred to (unit) for routine progression of care       Report consisted of patients Situation, Background, Assessment and   Recommendations(SBAR). Information from the following report(s) SBAR, Kardex and ED Summary was reviewed with the receiving nurse. Lines:   Peripheral IV 11/27/19 Right Antecubital (Active)   Site Assessment Clean, dry, & intact 11/27/2019  3:18 PM   Phlebitis Assessment 0 11/27/2019  3:18 PM   Infiltration Assessment 0 11/27/2019  3:18 PM   Dressing Status Clean, dry, & intact 11/27/2019  3:18 PM   Dressing Type Transparent 11/27/2019  3:18 PM   Hub Color/Line Status Pink 11/27/2019  3:18 PM   Alcohol Cap Used No 11/27/2019  3:18 PM       Peripheral IV 11/27/19 Left Forearm (Active)   Site Assessment Clean, dry, & intact 11/27/2019  7:49 PM   Phlebitis Assessment 0 11/27/2019  7:49 PM   Infiltration Assessment 0 11/27/2019  7:49 PM   Dressing Status Clean, dry, & intact 11/27/2019  7:49 PM        Opportunity for questions and clarification was provided.       Patient transported with:   [facility]

## 2019-11-28 NOTE — PROGRESS NOTES
Day #2 of Vancomycin  Indication:  R arm cellulitis with possible septic arthritis to elbow  Current regimen:  vanc 1.25g q18h  Abx regimen:  ceftriaxone/vanc  ID Following ?: NO  Concomitant nephrotoxic drugs (requires more frequent monitoring): Loop diuretics  Frequency of BMP?: daily    Recent Labs     19  1520   WBC 13.4*   CREA 1.13   BUN 21*     Est CrCl: 50-55 ml/min; UO: 0.4 ml/kg/hr as single void  Temp (24hrs), Av °F (36.7 °C), Min:97.7 °F (36.5 °C), Max:98.2 °F (36.8 °C)    Cultures:    elbow aspirate: no organisms seen, prelim   blood: NG x1 day, prelim    Goal trough = 10 - 15 mcg/mL    Recent trough history (date/time/level/dose/action taken):  N/a    Plan: Continue current regimen. Will assess with level once at steady state.     Viry Canas

## 2019-11-28 NOTE — PROGRESS NOTES
Full note to follow    Case discussed w son, Chantal Puri, pt is a DNR. DDNR to chart    Cont on broad coverage for cellulitis RUE    Last time pt in hospital 2018 - was somnolent til better then anxious to be up and out of hospital    PT /Ot to see in am     For now ivf/rest/abx, f/u lab    Mickey Burrell MD 2019       [address] [facility]  Hospitalist Group                                                                                          Hospitalist Progress Note  Mickey Burrell MD  Answering service: [phone] from in house phone        Date of Service:  2019  NAME:  Sofiya Aguilar  :  1935  MRN:  [id number]      Admission Summary:       CC: altered mental state        HPI: 81 y.o man w/ afib, CVA, HTN, COPD, probable dementia, nursing home resident, who presents with AMS. He was reportedly more confused than baseline during the day. [facility] and was diagnosed with a UTI based on a urine dipstick and cellulitis of the right arm. He was sent to the ED due to the increase in confusion. His son describes that he is confused at baseline and the severity is variable, and that he is back at baseline currently. He has not been formally diagnosed with dementia. No known fever or pain. The patient denies any complaints but he is a poor historian.       Interval history / Subjective:              Assessment & Plan:     UTI: (POA) this is based on an outpatient urine dipstick that showed +++ leukocyte esterase and + nitrite. We have been unable to obtain a urine specimen here so far. -send urine specimen when provided. Already received IV ceftriaxone which will be continued- no changes       R arm cellulitis with R elbow effusion:  -s/p arthrocentesis by ortho. Studies sent.  D/w ortho keep NPO for now until cell counts return  -will place on IV vancomycin for now - no changes       AMS: likely metabolic encephalopathy on chronic dementia though seems to have resolved per the family. Monitor     Chronic atrial fibrillation:  -continue diltiazem, metoprolol and warfarin     COPD w/ chronic hypoxic respiratory failure: stable  -continue O2  -PRN inhaled bronchodilators    DNR status, confirmed by review of DDNR     HTN:  -continue lisinopril, diltiazem, metoprolol - controlled      History of alcohol abuse: remote, resolved per son     History of CVA:  -continue aspirin     Normally gets his care in the [facility] system per his son     DVT ppx: anticoagulated  Code status: DNR - son states he has a DDNR at home  Disposition: prior living arrangement with ready          Care Plan discussed with: Patient/Family  Disposition: TBD     Hospital Problems  Never Reviewed          Codes Class Noted POA    Cellulitis of right arm ICD-10-CM: L03.113  ICD-9-CM: 682.3  11/27/2019 Unknown                Review of Systems:   Review of systems not obtained due to patient factors. Vital Signs:    Last 24hrs VS reviewed since prior progress note. Most recent are:  Visit Vitals  /61   Pulse 100   Temp 98 °F (36.7 °C)   Resp 18   Ht 5' 10\" (1.778 m)   Wt 81.6 kg (179 lb 14.3 oz)   SpO2 93%   BMI 25.81 kg/m²         Intake/Output Summary (Last 24 hours) at 11/29/2019 1046  Last data filed at 11/29/2019 0108  Gross per 24 hour   Intake    Output 201 ml   Net -201 ml        Physical Examination:             Constitutional:  No acute distress,     ENT:  Oral mucous moist, oropharynx benign. Resp:  diminished but else   No wheezing/rhonchi/rales. No accessory muscle use   CV:  Regular rhythm, normal rate, no murmurs, gallops, rubs    GI:  Soft, non distended, non tender. normoactive bowel sounds, no hepatosplenomegaly     Musculoskeletal:  No edema, warm, 2+ pulses throughout    Neurologic:  Moves all extremities.   Demented      Psych:  poor insight, not agitated not anxious   Skin:  Poor turgor, cellulitis rt arm to shoulder        Data Review:    Review and/or order of clinical lab test      Labs:     Recent Labs     11/29/19  0534 11/27/19  1520   WBC 12.2* 13.4*   HGB 13.0 13.4   HCT 43.6 43.3    219     Recent Labs     11/29/19  0534 11/28/19  0731 11/27/19  1520    139 137   K 3.8 4.2 4.0    102 98   CO2 31 34* 34*   BUN 14 17 21*   CREA 0.73 0.91 1.13   GLU 93 91 122*   CA 8.1* 9.3 9.8     Recent Labs     11/27/19  1520   SGOT 18   ALT 22   AP 82   TBILI 0.9   TP 6.9   ALB 3.3*   GLOB 3.6     Recent Labs     11/29/19  0534 11/28/19  0731 11/27/19  1942   INR 3.0* 2.6* 2.7*   PTP 28.3* 25.0*  --       No results for input(s): FE, TIBC, PSAT, FERR in the last 72 hours. No results found for: FOL, RBCF   No results for input(s): PH, PCO2, PO2 in the last 72 hours.   Recent Labs     11/27/19  1520   TROIQ <0.05     No results found for: CHOL, CHOLX, CHLST, CHOLV, HDL, HDLP, LDL, LDLC, DLDLP, TGLX, TRIGL, TRIGP, CHHD, CHHDX  No results found for: [facility]  Lab Results   Component Value Date/Time    Color YELLOW/STRAW 11/27/2019 09:08 PM    Appearance CLOUDY (A) 11/27/2019 09:08 PM    Specific gravity 1.020 11/27/2019 09:08 PM    pH (UA) 6.0 11/27/2019 09:08 PM    Protein 100 (A) 11/27/2019 09:08 PM    Glucose NEGATIVE  11/27/2019 09:08 PM    Ketone TRACE (A) 11/27/2019 09:08 PM    Bilirubin NEGATIVE  11/27/2019 09:08 PM    Urobilinogen 1.0 11/27/2019 09:08 PM    Nitrites NEGATIVE  11/27/2019 09:08 PM    Leukocyte Esterase LARGE (A) 11/27/2019 09:08 PM    Epithelial cells FEW 11/27/2019 09:08 PM    Bacteria NEGATIVE  11/27/2019 09:08 PM    WBC >100 (H) 11/27/2019 09:08 PM    RBC >100 (H) 11/27/2019 09:08 PM         Medications Reviewed:     Current Facility-Administered Medications   Medication Dose Route Frequency    warfarin (COUMADIN) tablet 2.5 mg  2.5 mg Oral ONCE    albuterol-ipratropium (DUO-NEB) 2.5 MG-0.5 MG/3 ML  3 mL Nebulization Q4H PRN    traMADol (ULTRAM) tablet 50 mg  50 mg Oral Q6H PRN    fentaNYL citrate (PF) injection 25 mcg  25 mcg IntraVENous Q4H PRN    hydrALAZINE (APRESOLINE) 20 mg/mL injection 20 mg  20 mg IntraVENous Q6H PRN    lisinopril (PRINIVIL, ZESTRIL) tablet 10 mg  10 mg Oral DAILY    amLODIPine (NORVASC) tablet 5 mg  5 mg Oral DAILY    0.9% sodium chloride infusion  125 mL/hr IntraVENous CONTINUOUS    sodium chloride (NS) flush 5-40 mL  5-40 mL IntraVENous Q8H    sodium chloride (NS) flush 5-40 mL  5-40 mL IntraVENous PRN    acetaminophen (TYLENOL) tablet 650 mg  650 mg Oral Q4H PRN    ondansetron (ZOFRAN) injection 4 mg  4 mg IntraVENous Q4H PRN    cefTRIAXone (ROCEPHIN) 1 g in 0.9% sodium chloride (MBP/ADV) 50 mL  1 g IntraVENous Q24H    aspirin delayed-release tablet 81 mg  81 mg Oral DAILY    atorvastatin (LIPITOR) tablet 40 mg  40 mg Oral DAILY    dilTIAZem (CARDIZEM) IR tablet 30 mg  30 mg Oral TIDAC    furosemide (LASIX) tablet 20 mg  20 mg Oral DAILY    metoprolol tartrate (LOPRESSOR) tablet 50 mg  50 mg Oral BID    thiamine HCL (B-1) tablet 100 mg  100 mg Oral DAILY    Warfarin - pharmacy to dose   Other Rx Dosing/Monitoring    Vancomycin - pharmacy to dose   Other Rx Dosing/Monitoring    vancomycin (VANCOCIN) 1250 mg in  ml infusion  1,250 mg IntraVENous Q18H     ______________________________________________________________________  EXPECTED LENGTH OF STAY: - - -  ACTUAL LENGTH OF STAY:          2                 Karen Porter MD

## 2019-11-28 NOTE — PROGRESS NOTES
Ortho:    Prelim aspirate right elbow joint:  Gr stain no orgs. TNC 19.8k. Neg crystals. OK to eat. No plans for ortho intervention at this time. Cont rocephin and lucien for now. Dr. Calvin Pearce will examine in am.    D/W Dr. Calvin Pearce.   MACKENZIE Hawley

## 2019-11-28 NOTE — ED NOTES
Bedside shift change report given to SHANTE Mendoza  (oncoming nurse) by Jessee Dixon RN  (offgoing nurse). Report included the following information SBAR, Kardex and ED Summary.

## 2019-11-28 NOTE — PROGRESS NOTES
Admission Medication Reconciliation:    Information obtained from:  Medication list provided by son, from facility  RxQuery data available¹:  YES    Comments/Recommendations: Updated PTA meds/reviewed patient's allergies. RN had already updated administration times as per son. Medication changes (since last review): Added  Bevespi inhaler  MVT    Thank you for allowing me to participate in the care of your patient. Georges Knott PharmD, RN #[id number] [facility] benefit data reflects medications filled and processed through the patient's insurance, however   this data does NOT capture whether the medication was picked up or is currently being taken by the patient. Allergies:  Seroquel [quetiapine]    Significant PMH/Disease States:   Past Medical History:   Diagnosis Date    Atrial fibrillation ([facility] [address]     Chronic obstructive pulmonary disease ([facility] [address]     Hypertension      Chief Complaint for this Admission:    Chief Complaint   Patient presents with    Altered mental status     Prior to Admission Medications:   Prior to Admission Medications   Prescriptions Last Dose Informant Patient Reported? Taking?   acetaminophen (TYLENOL) 325 mg tablet 2019  Yes Yes   Sig: Take 650 mg by mouth every four (4) hours as needed for Pain. albuterol (PROVENTIL VENTOLIN) 2.5 mg /3 mL (0.083 %) nebu 2019  Yes Yes   Si.5 mg by Nebulization route. BID for shortness of breath   albuterol (VENTOLIN HFA) 90 mcg/actuation inhaler 2019  Yes Yes   Sig: Take  by inhalation every four (4) hours as needed for Wheezing. aspirin delayed-release 81 mg tablet 2019  Yes Yes   Sig: Take  by mouth daily. atorvastatin (LIPITOR) 40 mg tablet 2019  Yes Yes   Sig: Take 40 mg by mouth daily. At bedtime   cholecalciferol, vitamin D3, (VITAMIN D3) 2,000 unit tab 2019  Yes Yes   Sig: Take 5,000 Units by mouth daily.    cyanocobalamin (VITAMIN B-12) 1,000 mcg tablet 2019  Yes Yes   Sig: Take 1,000 mcg by mouth daily. dilTIAZem (CARDIZEM) 30 mg tablet 11/26/2019  Yes Yes   Sig: Take 30 mg by mouth. Every 8 hours for HTN   furosemide (LASIX) 20 mg tablet 11/26/2019  Yes Yes   Sig: Take 20 mg by mouth daily. glycopyrrolate-formoterol (BEVESPI AEROSPHERE) 9-4.8 mcg HFAA 11/27/2019 at Unknown time  Yes Yes   Sig: Take 2 Puffs by inhalation two (2) times a day. Use with spacer   guaiFENesin (MUCINEX) 1,200 mg Ta12 ER tablet 11/26/2019  Yes Yes   Sig: Take 1,200 mg by mouth two (2) times a day. PRN cough and congestion   lisinopril (PRINIVIL, ZESTRIL) 5 mg tablet 11/26/2019  Yes Yes   Sig: Take  by mouth daily. loratadine 10 mg cap 11/26/2019  Yes Yes   Sig: Take 10 mg by mouth daily. metoprolol tartrate (LOPRESSOR) 50 mg tablet 11/26/2019  Yes Yes   Sig: Take 50 mg by mouth two (2) times a day. therapeutic multivitamin [facility]) tablet   Yes Yes   Sig: Take 1 Tab by mouth daily. thiamine HCL (B-1) 100 mg tablet 11/26/2019  Yes Yes   Sig: Take 100 mg by mouth daily. warfarin (COUMADIN) 4 mg tablet 11/27/2019 at Unknown time  Yes Yes   Sig: Take 4 mg by mouth daily. Facility-Administered Medications: None       Please contact the main inpatient pharmacy with any questions or concerns at [phone] and we will direct you to the clinical pharmacist covering this patient's care while in-house.    APRIL Swan

## 2019-11-28 NOTE — CONSULTS
ORTHO CONSULT NOTE    Date of Consultation:  November 27, 2019  Referring Physician:  Adis Kenyatta: AMS and right elbow pain    HPI:  Lore Rodarte is a 81 y.o. male who presents to ER with AMS and UTI. Patient is poor historian and history obtained largely from son, ER MD, and [facility] records. ER noted right elbow erythema and edema prompting xray which revealed joint effusion but no fracture. Denies known trauma but reports decreased elbow ROM. Per son, baseline elbow AROM is symmetric to contra-lateral side. Denies h/o gout/pseudogout and elbow bursitis. At baseline ambulates with walker. Resides in facility. On coumadin for arrhythmia. Past Medical History:   Diagnosis Date    Atrial fibrillation ([facility] [address]     Chronic obstructive pulmonary disease ([facility] [address]     Hypertension       History reviewed. No pertinent surgical history. History reviewed. No pertinent family history. Social History     Tobacco Use    Smoking status: Former Smoker    Smokeless tobacco: Never Used   Substance Use Topics    Alcohol use: Not Currently       Allergies   Allergen Reactions    Seroquel [Quetiapine] Unknown (comments)        Review of Systems:  Per HPI. Objective:     Patient Vitals for the past 8 hrs:   BP Pulse Resp SpO2   11/27/19 1930 148/85 86 25 99 %   11/27/19 1900 147/84 87 16 (!) 69 %   11/27/19 1830 157/84 83 28 96 %   11/27/19 1730 155/90 94 (!) 31 94 %   11/27/19 1517 133/70 82 (!) 33 98 %     No data recorded. EXAM:   NAD. Lying in bed. Can't really answer questions and follows commands poorly. Son present. Moves left elbow/wrist well. Some ecchymosis but no edema. Right elbow mod edema. No bursal swelling. Global posterior elbow erythema without streaking. AROM 45-90 and will not tolerate further motion passively. Pain with passive supination/pronation. Can flex wrist OK. Moves digits OK. Palp radial pulse. Hand SILT. No shoulder TTP.     Imaging Review:   Results from [facility] Encounter encounter on 11/27/19   XR ELBOW RT MIN 3 V    Narrative EXAM: XR ELBOW RT MIN 3 V    INDICATION: Right elbow pain and swelling. Confusion. No history of injury. COMPARISON: None. FINDINGS: Three views of the right elbow demonstrate antecubital intravenous  access. Elbow joint effusion is moderate. Bones are osteopenic. No acute  fracture or evidence of osteomyelitis. Mild ulnotrochlear osteoarthritis. Impression IMPRESSION:     Nonspecific joint effusion. No evidence of fracture or osteomyelitis. Labs:   WBC 13.4, ESR 18, CRP 8. 41. Lactic 1.2. INR 2.7. Impression:   AMS with apparent UTI. Right elbow effusion: septic vs inflammatory vs traumatic. Plan:   I spoke with son about need to aspirate right elbow effusion with limited ROM and no known trauma. This could easily be inflammatory or hemarthrosis from coumadin but need to R/O septic joint. Elevated WBC and CRP could be from UTI and/or cellulitis right elbow. Discussed potential benefit of aspiration incl R/O septic joint as well as risks of causing infection or hemarthrosis. Patient and son (mPOA) consent. Send aspirate for cell count, crystals, gr stain, and culture. Ice. ACE. Keep NPO until initial labs result. Rochepin for UTI for now. May need additional abx for elbow pending labs. Dr. Boubacar Mar is aware and agrees with above plan.       MACKENZIE Williamson  Orthopedic Trauma Service  [facility]

## 2019-11-28 NOTE — PROGRESS NOTES
Called respiratory to let them know that pt is having some difficulty and mild wheezing with breathing and that stat breathing tx is ordered. They stated they will be here in a few minutes.

## 2019-11-28 NOTE — PROGRESS NOTES
2200 - pt moved to room 537. Pt son at the bedside and says pt tends to sundown and get impulsive at night. Bed alarm placed and will monitor.

## 2019-11-28 NOTE — PROGRESS NOTES
Pharmacist Note - Warfarin Dosing  Consult provided for this 84 y.o.male to manage warfarin for Atrial Fibrillation    INR Goal: 2 - 3    Home regimen/ tablet size: 4 mg daily    Drugs that may increase INR: None  Drugs that may decrease INR: None  Other current anticoagulants/ drugs that may increase bleeding risk: Aspirin  Risk factors: Age > 65  Daily INR ordered: YES    Recent Labs     11/28/19  0731 11/27/19  1942 11/27/19  1520   HGB  --   --  13.4   INR 2.6* 2.7*  --      Date               INR                  Dose  11/27  2.7  4 mg (took PTA)   11/28  2.6  4 mg                                                                                Assessment/ Plan: Will continue home regimen with therapeutic INRs. Order for warfarin 4mg x1 tonight. Pharmacy will continue to monitor daily and adjust therapy as indicated. Please contact the pharmacist at  for outpatient recommendations if needed.      Viry Hunt

## 2019-11-28 NOTE — ROUTINE PROCESS
Primary Nurse Darcy Hernandez and Lita Rasmussen RN performed a dual skin assessment on this patient impairment noted Oscar score is 18 Redness do sacrum

## 2019-11-28 NOTE — PROGRESS NOTES
Pharmacist Note - Vancomycin Dosing    Consult provided for this 81 y.o. male for indication of R arm cellulitis, possible septic arthritis. Antibiotic regimen(s): Vanc + Ceftriaxone  Patient on vancomycin PTA? NO     Recent Labs     19  1520   WBC 13.4*   CREA 1.13   BUN 21*     Frequency of BMP: daily x 3  Height: 177.8 cm  Weight: 81.6 kg  Est CrCl: 51 ml/min; UO: -- ml/kg/hr  Temp (24hrs), Av.9 °F (36.6 °C), Min:97.7 °F (36.5 °C), Max:98 °F (36.7 °C)    Cultures:   blood - pending   urine - pending   joint fluid - 1+WBC, no organisms, pending    Goal trough = 15 - 20 mcg/mL (until septic arthritis r/o)    Therapy will be initiated with a loading dose of 2000 mg IV x 1 to be followed by a maintenance dose of 1250 mg IV every 18 hours. Pharmacy to follow patient daily and order levels / make dose adjustments as appropriate.

## 2019-11-28 NOTE — PROCEDURES
Ortho:    Discussed risks/benefits of right elbow joint aspiration with patient and son who consent. Sterile technique, chlora-prep, lateral approach, 4 cc 1% pl lidocaine. I then repeated prep and aspirated about 3 cc of cloudy fluid. Patient then moved his arm some and aspirate became bloody. At that point, I aborted procedure as patient could no longer keep still. Covered with band-aid, 4x4s, and ACE. Patient hal well.     MACKENZIE Peng

## 2019-11-29 LAB
ANION GAP SERPL CALC-SCNC: 2 MMOL/L (ref 5–15)
BACTERIA SPEC CULT: NORMAL
BASOPHILS # BLD: 0.1 K/UL (ref 0–0.1)
BASOPHILS NFR BLD: 1 % (ref 0–1)
BUN SERPL-MCNC: 14 MG/DL (ref 6–20)
BUN/CREAT SERPL: 19 (ref 12–20)
CALCIUM SERPL-MCNC: 8.1 MG/DL (ref 8.5–10.1)
CC UR VC: NORMAL
CHLORIDE SERPL-SCNC: 107 MMOL/L (ref 97–108)
CO2 SERPL-SCNC: 31 MMOL/L (ref 21–32)
CREAT SERPL-MCNC: 0.73 MG/DL (ref 0.7–1.3)
DIFFERENTIAL METHOD BLD: ABNORMAL
EOSINOPHIL # BLD: 0.1 K/UL (ref 0–0.4)
EOSINOPHIL NFR BLD: 1 % (ref 0–7)
ERYTHROCYTE [DISTWIDTH] IN BLOOD BY AUTOMATED COUNT: 13.2 % (ref 11.5–14.5)
GLUCOSE SERPL-MCNC: 93 MG/DL (ref 65–100)
HCT VFR BLD AUTO: 43.6 % (ref 36.6–50.3)
HGB BLD-MCNC: 13 G/DL (ref 12.1–17)
IMM GRANULOCYTES # BLD AUTO: 0 K/UL
IMM GRANULOCYTES NFR BLD AUTO: 0 %
INR PPP: 3 (ref 0.9–1.1)
LYMPHOCYTES # BLD: 0.7 K/UL (ref 0.8–3.5)
LYMPHOCYTES NFR BLD: 6 % (ref 12–49)
MCH RBC QN AUTO: 30.5 PG (ref 26–34)
MCHC RBC AUTO-ENTMCNC: 29.8 G/DL (ref 30–36.5)
MCV RBC AUTO: 102.3 FL (ref 80–99)
MONOCYTES # BLD: 0.6 K/UL (ref 0–1)
MONOCYTES NFR BLD: 5 % (ref 5–13)
NEUTS BAND NFR BLD MANUAL: 4 % (ref 0–6)
NEUTS SEG # BLD: 10.7 K/UL (ref 1.8–8)
NEUTS SEG NFR BLD: 83 % (ref 32–75)
NRBC # BLD: 0 K/UL (ref 0–0.01)
NRBC BLD-RTO: 0 PER 100 WBC
PLATELET # BLD AUTO: 207 K/UL (ref 150–400)
PMV BLD AUTO: 10.3 FL (ref 8.9–12.9)
POTASSIUM SERPL-SCNC: 3.8 MMOL/L (ref 3.5–5.1)
PROTHROMBIN TIME: 28.3 SEC (ref 9–11.1)
RBC # BLD AUTO: 4.26 M/UL (ref 4.1–5.7)
RBC MORPH BLD: ABNORMAL
SERVICE CMNT-IMP: NORMAL
SODIUM SERPL-SCNC: 140 MMOL/L (ref 136–145)
WBC # BLD AUTO: 12.2 K/UL (ref 4.1–11.1)

## 2019-11-29 PROCEDURE — 65270000029 HC RM PRIVATE

## 2019-11-29 PROCEDURE — 85025 COMPLETE CBC W/AUTO DIFF WBC: CPT

## 2019-11-29 PROCEDURE — 97535 SELF CARE MNGMENT TRAINING: CPT | Performed by: OCCUPATIONAL THERAPIST

## 2019-11-29 PROCEDURE — 97110 THERAPEUTIC EXERCISES: CPT | Performed by: OCCUPATIONAL THERAPIST

## 2019-11-29 PROCEDURE — 74011000250 HC RX REV CODE- 250: Performed by: INTERNAL MEDICINE

## 2019-11-29 PROCEDURE — 74011250637 HC RX REV CODE- 250/637: Performed by: INTERNAL MEDICINE

## 2019-11-29 PROCEDURE — 85610 PROTHROMBIN TIME: CPT

## 2019-11-29 PROCEDURE — 92610 EVALUATE SWALLOWING FUNCTION: CPT | Performed by: SPEECH-LANGUAGE PATHOLOGIST

## 2019-11-29 PROCEDURE — 97165 OT EVAL LOW COMPLEX 30 MIN: CPT | Performed by: OCCUPATIONAL THERAPIST

## 2019-11-29 PROCEDURE — 80048 BASIC METABOLIC PNL TOTAL CA: CPT

## 2019-11-29 PROCEDURE — 74011250636 HC RX REV CODE- 250/636: Performed by: HOSPITALIST

## 2019-11-29 PROCEDURE — 94640 AIRWAY INHALATION TREATMENT: CPT

## 2019-11-29 PROCEDURE — 74011250637 HC RX REV CODE- 250/637: Performed by: HOSPITALIST

## 2019-11-29 PROCEDURE — 74011250636 HC RX REV CODE- 250/636: Performed by: INTERNAL MEDICINE

## 2019-11-29 PROCEDURE — 36415 COLL VENOUS BLD VENIPUNCTURE: CPT

## 2019-11-29 PROCEDURE — 74011000258 HC RX REV CODE- 258: Performed by: HOSPITALIST

## 2019-11-29 RX ORDER — WARFARIN 2.5 MG/1
2.5 TABLET ORAL ONCE
Status: COMPLETED | OUTPATIENT
Start: 2019-11-29 | End: 2019-11-29

## 2019-11-29 RX ORDER — IPRATROPIUM BROMIDE AND ALBUTEROL SULFATE 2.5; .5 MG/3ML; MG/3ML
3 SOLUTION RESPIRATORY (INHALATION)
Status: DISCONTINUED | OUTPATIENT
Start: 2019-11-29 | End: 2019-12-01 | Stop reason: SDUPTHER

## 2019-11-29 RX ADMIN — METOPROLOL TARTRATE 50 MG: 50 TABLET, FILM COATED ORAL at 08:48

## 2019-11-29 RX ADMIN — FUROSEMIDE 20 MG: 20 TABLET ORAL at 08:48

## 2019-11-29 RX ADMIN — Medication 10 ML: at 04:06

## 2019-11-29 RX ADMIN — DILTIAZEM HYDROCHLORIDE 30 MG: 30 TABLET, FILM COATED ORAL at 12:51

## 2019-11-29 RX ADMIN — DILTIAZEM HYDROCHLORIDE 30 MG: 30 TABLET, FILM COATED ORAL at 16:33

## 2019-11-29 RX ADMIN — CEFTRIAXONE 1 G: 1 INJECTION, POWDER, FOR SOLUTION INTRAMUSCULAR; INTRAVENOUS at 18:22

## 2019-11-29 RX ADMIN — ASPIRIN 81 MG: 81 TABLET, COATED ORAL at 08:48

## 2019-11-29 RX ADMIN — Medication 100 MG: at 08:48

## 2019-11-29 RX ADMIN — ATORVASTATIN CALCIUM 40 MG: 40 TABLET, FILM COATED ORAL at 08:48

## 2019-11-29 RX ADMIN — Medication 10 ML: at 07:28

## 2019-11-29 RX ADMIN — IPRATROPIUM BROMIDE AND ALBUTEROL SULFATE 3 ML: .5; 3 SOLUTION RESPIRATORY (INHALATION) at 11:09

## 2019-11-29 RX ADMIN — VANCOMYCIN HYDROCHLORIDE 1250 MG: 10 INJECTION, POWDER, LYOPHILIZED, FOR SOLUTION INTRAVENOUS at 10:17

## 2019-11-29 RX ADMIN — SODIUM CHLORIDE 125 ML/HR: 900 INJECTION, SOLUTION INTRAVENOUS at 10:17

## 2019-11-29 RX ADMIN — WARFARIN SODIUM 2.5 MG: 2.5 TABLET ORAL at 16:36

## 2019-11-29 RX ADMIN — DILTIAZEM HYDROCHLORIDE 30 MG: 30 TABLET, FILM COATED ORAL at 07:28

## 2019-11-29 NOTE — PROGRESS NOTES
Patient was working with OT. RN stepped out of the room and went to the nursing station. With in few minutes she heard patient\"s persistent cough. RN went back to the room. Per OT, patient chocked on breakfast. RN observed mouth full of food and aspiration symptoms. MD was notified, patient was suction with yankeuer  And aspiration precautions were taking. Speech Therapy was consulted.

## 2019-11-29 NOTE — PROGRESS NOTES
ORTHO PROGRESS NOTE      SUBJECTIVE:  Jessa Hicks still has right elbow pain and can't communicate if pain is improving. OBJECTIVE:  Patient Vitals for the past 24 hrs:   Temp Pulse BP   11/29/19 0831 98 °F (36.7 °C) 100 127/61   11/29/19 0300 98.7 °F (37.1 °C) 98 150/72   11/28/19 2010 98.7 °F (37.1 °C) (!) 109 173/70   11/28/19 1643  70 130/70   11/28/19 1456 98.3 °F (36.8 °C) 73 126/72   11/28/19 1111  65 155/70       Awake, no distress. Lying in bed. No family present. Right elbow more erythema and edema compared to ER presentation. Edema seems more subcutaneous today. No open wound. No olecranon TTP. AROM 45-60. Moves hand OK. Has IV access right AC. Recent Labs     11/29/19  0534   HGB 13.0   HCT 43.6      INR 3.0*   BUN 14   CREA 0.73   GFRAA >60   GFRNA >60     WBC 12.2  Elbow/blood cultures NGTD. ASSESSMENT:  Right elbow cellulitis. PLAN:  I examined patient with Dr. Alonso Jordan of hospitalist team.  Dr. Alonso Jordan thinks erythema less extensive than yesterday (I didn't see patient yesterday). Cont IV abx. I had nursing remove IV acces in right elbow AC since functioning access LUE. Will follow. D/W Dr. Diana Benito who agrees.     MACKENZIE Shaver  Orthopedic Trauma Service  [facility] [facility]

## 2019-11-29 NOTE — PROGRESS NOTES
Pharmacist Note - Warfarin Dosing  Consult provided for this 84 y.o.male to manage warfarin for Atrial Fibrillation    INR Goal: 2 - 3    Home regimen/ tablet size: 4 mg daily    Drugs that may increase INR: None  Drugs that may decrease INR: None  Other current anticoagulants/ drugs that may increase bleeding risk: Aspirin  Risk factors: Age > 65  Daily INR ordered: YES    Recent Labs     11/29/19  0534 11/28/19  0731 11/27/19  1942 11/27/19  1520   HGB 13.0  --   --  13.4   INR 3.0* 2.6* 2.7*  --      Date               INR                  Dose  11/27  2.7  4 mg (took PTA)   11/28  2.6  4 mg   11/29  3  2.5 mg                                                                                Assessment/ Plan: Will order warfarin 2.5 mg x1 tonight. Pharmacy will continue to monitor daily and adjust therapy as indicated. Please contact the pharmacist at  for outpatient recommendations if needed.      Severo Ports, [facility]

## 2019-11-29 NOTE — PROGRESS NOTES
[address] [facility]  Hospitalist Group                                                                                          Hospitalist Progress Note  Tawana Ang MD  Answering service: [phone] or [phone] from in house phone        Date of Service:  2019  NAME:  Petra Burnham  :  1935  MRN:  [id number]      Admission Summary:       CC: altered mental state        HPI: 81 y.o man w/ afib, CVA, HTN, COPD, probable dementia, nursing home resident, who presents with AMS. He was reportedly more confused than baseline during the day. He was seen by [facility] and was diagnosed with a UTI based on a urine dipstick and cellulitis of the right arm. He was sent to the ED due to the increase in confusion. His son describes that he is confused at baseline and the severity is variable, and that he is back at baseline currently. He has not been formally diagnosed with dementia. No known fever or pain. The patient denies any complaints but he is a poor historian.       Interval history / Subjective:       2019 : pt aspirated, non re breather mask, nebs, general support   Not by end of rounds most of acute aspiration symptoms abated, pt doing well on RA       Assessment & Plan:     UTI: (POA) this is based on an outpatient urine dipstick that showed +++ leukocyte esterase and + nitrite. We have been unable to obtain a urine specimen here so far. -send urine specimen when provided. Already received IV ceftriaxone which will be continued- no changes       R arm cellulitis with R elbow effusion:  -s/p arthrocentesis by ortho. Studies sent. D/w ortho keep NPO for now until cell counts return  -will place on IV vancomycin for now - no changes       Acute aspiration for speech eval     AMS: likely metabolic encephalopathy on chronic dementia though seems to have resolved per the family.  Monitor    Acute agitation as an exacerbation of dementia  pm. Haldol x 1 dose given    Chronic atrial fibrillation:  -continue diltiazem, metoprolol and warfarin     COPD w/ chronic hypoxic respiratory failure: stable  -continue O2  -PRN inhaled bronchodilators    DNR status, confirmed by review of DDNR     HTN:  -continue lisinopril, diltiazem, metoprolol - controlled      History of alcohol abuse: remote, resolved per son     History of CVA:  -continue aspirin     Normally gets his care in the [facility] system per his son     DVT ppx: anticoagulated  Code status: DNR - son states he has a DDNR at home  Disposition: prior living arrangement with ready          Care Plan discussed with: Patient/Family  Disposition: TBD     Hospital Problems  Never Reviewed          Codes Class Noted POA    Cellulitis of right arm ICD-10-CM: L03.113  ICD-9-CM: 682.3  11/27/2019 Unknown                Review of Systems:   Review of systems not obtained due to patient factors. Vital Signs:    Last 24hrs VS reviewed since prior progress note. Most recent are:  Visit Vitals  /61   Pulse 100   Temp 98 °F (36.7 °C)   Resp 18   Ht 5' 10\" (1.778 m)   Wt 81.6 kg (179 lb 14.3 oz)   SpO2 93%   BMI 25.81 kg/m²         Intake/Output Summary (Last 24 hours) at 11/29/2019 1050  Last data filed at 11/29/2019 0108  Gross per 24 hour   Intake    Output 201 ml   Net -201 ml        Physical Examination:             Constitutional:  No acute distress,     ENT:  Oral mucous moist, oropharynx benign. Resp:  diminished but else   No wheezing/rhonchi/rales. No accessory muscle use   CV:  Regular rhythm, normal rate, no murmurs, gallops, rubs    GI:  Soft, non distended, non tender. normoactive bowel sounds, no hepatosplenomegaly     Musculoskeletal:  No edema, warm, 2+ pulses throughout    Neurologic:  Moves all extremities.   Demented      Psych:  poor insight, not agitated not anxious   Skin:  Poor turgor, cellulitis rt arm to shoulder        Data Review:    Review and/or order of clinical lab test      Labs:     Recent Labs 11/29/19  0534 11/27/19  1520   WBC 12.2* 13.4*   HGB 13.0 13.4   HCT 43.6 43.3    219     Recent Labs     11/29/19  0534 11/28/19  0731 11/27/19  1520    139 137   K 3.8 4.2 4.0    102 98   CO2 31 34* 34*   BUN 14 17 21*   CREA 0.73 0.91 1.13   GLU 93 91 122*   CA 8.1* 9.3 9.8     Recent Labs     11/27/19  1520   SGOT 18   ALT 22   AP 82   TBILI 0.9   TP 6.9   ALB 3.3*   GLOB 3.6     Recent Labs     11/29/19  0534 11/28/19  0731 11/27/19  1942   INR 3.0* 2.6* 2.7*   PTP 28.3* 25.0*  --       No results for input(s): FE, TIBC, PSAT, FERR in the last 72 hours. No results found for: FOL, RBCF   No results for input(s): PH, PCO2, PO2 in the last 72 hours.   Recent Labs     11/27/19  1520   TROIQ <0.05     No results found for: CHOL, CHOLX, CHLST, CHOLV, HDL, HDLP, LDL, LDLC, DLDLP, TGLX, TRIGL, TRIGP, CHHD, CHHDX  No results found for: Jaimie Savers  Lab Results   Component Value Date/Time    Color YELLOW/STRAW 11/27/2019 09:08 PM    Appearance CLOUDY (A) 11/27/2019 09:08 PM    Specific gravity 1.020 11/27/2019 09:08 PM    pH (UA) 6.0 11/27/2019 09:08 PM    Protein 100 (A) 11/27/2019 09:08 PM    Glucose NEGATIVE  11/27/2019 09:08 PM    Ketone TRACE (A) 11/27/2019 09:08 PM    Bilirubin NEGATIVE  11/27/2019 09:08 PM    Urobilinogen 1.0 11/27/2019 09:08 PM    Nitrites NEGATIVE  11/27/2019 09:08 PM    Leukocyte Esterase LARGE (A) 11/27/2019 09:08 PM    Epithelial cells FEW 11/27/2019 09:08 PM    Bacteria NEGATIVE  11/27/2019 09:08 PM    WBC >100 (H) 11/27/2019 09:08 PM    RBC >100 (H) 11/27/2019 09:08 PM         Medications Reviewed:     Current Facility-Administered Medications   Medication Dose Route Frequency    warfarin (COUMADIN) tablet 2.5 mg  2.5 mg Oral ONCE    albuterol-ipratropium (DUO-NEB) 2.5 MG-0.5 MG/3 ML  3 mL Nebulization Q4H PRN    traMADol (ULTRAM) tablet 50 mg  50 mg Oral Q6H PRN    fentaNYL citrate (PF) injection 25 mcg  25 mcg IntraVENous Q4H PRN    hydrALAZINE (APRESOLINE) 20 mg/mL injection 20 mg  20 mg IntraVENous Q6H PRN    lisinopril (PRINIVIL, ZESTRIL) tablet 10 mg  10 mg Oral DAILY    amLODIPine (NORVASC) tablet 5 mg  5 mg Oral DAILY    0.9% sodium chloride infusion  125 mL/hr IntraVENous CONTINUOUS    sodium chloride (NS) flush 5-40 mL  5-40 mL IntraVENous Q8H    sodium chloride (NS) flush 5-40 mL  5-40 mL IntraVENous PRN    acetaminophen (TYLENOL) tablet 650 mg  650 mg Oral Q4H PRN    ondansetron (ZOFRAN) injection 4 mg  4 mg IntraVENous Q4H PRN    cefTRIAXone (ROCEPHIN) 1 g in 0.9% sodium chloride (MBP/ADV) 50 mL  1 g IntraVENous Q24H    aspirin delayed-release tablet 81 mg  81 mg Oral DAILY    atorvastatin (LIPITOR) tablet 40 mg  40 mg Oral DAILY    dilTIAZem (CARDIZEM) IR tablet 30 mg  30 mg Oral TIDAC    furosemide (LASIX) tablet 20 mg  20 mg Oral DAILY    metoprolol tartrate (LOPRESSOR) tablet 50 mg  50 mg Oral BID    thiamine HCL (B-1) tablet 100 mg  100 mg Oral DAILY    Warfarin - pharmacy to dose   Other Rx Dosing/Monitoring    Vancomycin - pharmacy to dose   Other Rx Dosing/Monitoring    vancomycin (VANCOCIN) 1250 mg in  ml infusion  1,250 mg IntraVENous Q18H     ______________________________________________________________________  EXPECTED LENGTH OF STAY: - - -  ACTUAL LENGTH OF STAY:          2                 Karen Porter MD

## 2019-11-29 NOTE — PROGRESS NOTES
Problem: Self Care Deficits Care Plan (Adult)  Goal: *Acute Goals and Plan of Care (Insert Text)  Description    FUNCTIONAL STATUS PRIOR TO ADMISSION: lives in SNF, hx of dementia and confusion per chart, patient reports mobility without equipment     HOME SUPPORT: per chart has son in area    Occupational Therapy Goals  Initiated 11/29/2019  1. Patient will perform self-feeding with supervision/set-up using right dominant hand within 7 day(s). 2.  Patient will perform grooming with supervision/set-up within 7 day(s). 3.  Patient will perform upper body dressing with minimal assistance/contact guard assist within 7 day(s). 4.  Patient will perform toilet transfers with minimal assistance/contact guard assist and best technique within 7 day(s). 5.  Patient will tolerate AAROM right UE all joints with minimal complaint of pain within 7 day(s). OCCUPATIONAL THERAPY EVALUATION  Patient: Eduar Elkins (53 y.o. male)  Date: 11/29/2019  Primary Diagnosis: Cellulitis of right arm [L03.113]        Precautions:   Skin, Fall    ASSESSMENT  Based on the objective data described below, the patient presents with decreased ability to perform all basic ADL's and mobility due to right UE pain with all movement. Unable to attempt to stand due to pain and patient declined at this time. Of note, eating with supervision this date and cues for portion control and coughing while eating, RN aware, recommend supervision all P.O. Current Level of Function Impacting Discharge (ADLs/self-care): overall total assist UE and LE ADL's at this time, set up and supervision eating    Functional Outcome Measure: The patient scored 15/100 on the Barthel Index outcome measure     Other factors to consider for discharge: from SNF     Patient will benefit from skilled therapy intervention to address the above noted impairments.        PLAN :  Recommendations and Planned Interventions: self care training, functional mobility training, therapeutic exercise, balance training, therapeutic activities, endurance activities, patient education, home safety training, and family training/education    Frequency/Duration: Patient will be followed by occupational therapy 3 times a week to address goals. Recommendation for discharge: (in order for the patient to meet his/her long term goals)  Therapy up to 5 days/week in SNF setting    This discharge recommendation:  Has been made in collaboration with the attending provider and/or case management    IF patient discharges home will need the following DME: none       SUBJECTIVE:   Patient stated Ow.  stated with movement/repositioning of right UE    OBJECTIVE DATA SUMMARY:   HISTORY:   Past Medical History:   Diagnosis Date    Atrial fibrillation ([facility] [address]     Chronic obstructive pulmonary disease ([facility] [address]     Hypertension    History reviewed. No pertinent surgical history. Expanded or extensive additional review of patient history:          Hand dominance: Right    EXAMINATION OF PERFORMANCE DEFICITS:  Cognitive/Behavioral Status:  Neurologic State: Alert;Confused  Orientation Level: Oriented to person;Disoriented to place; Disoriented to situation;Disoriented to time  Cognition: Follows commands; Appropriate for age attention/concentration;Memory loss  Perception: Appears intact  Perseveration: No perseveration noted  Safety/Judgement: Decreased awareness of environment    Skin: noted redness right UE, and mass at mid back    Edema: right UE proximal to distal    Hearing:   Auditory  Auditory Impairment: Hard of hearing, bilateral(unknown if hearing aids)    Vision/Perceptual:                                     Range of Motion:  AROM: Within functional limits(left UE, wrist and hand right UE limited by pain)      Unable to perform AROM or PROM right UE elbow or shoulder                   Strength:  Strength: Generally decreased, functional, left UE, unable to test right UE                Coordination:     Fine Motor Skills-Upper: Right Impaired;Left Intact    Gross Motor Skills-Upper: Right Impaired;Left Intact    Tone & Sensation:  Tone: Normal  Sensation: (not tested)                      Balance:  Sitting: Impaired; Without support  Sitting - Static: Poor (constant support)  Sitting - Dynamic: Poor (constant support)  Standing: Impaired    Functional Mobility and Transfers for ADLs:  Bed Mobility:  Rolling: Moderate assistance  Supine to Sit: Moderate assistance; Additional time(on left)  Scooting: Minimum assistance; Other (comment); Moderate assistance; Additional time(in supine)    Transfers:  Sit to Stand: Total assistance(unable)  Bed to Chair: Total assistance  Toilet Transfer : Total assistance    ADL Assessment:  Feeding: Supervision;Setup; Other (comment)(cues for portion control, began to cough after bite, RN in)    Oral Facial Hygiene/Grooming: Maximum assistance    Bathing: Total assistance    Upper Body Dressing: Total assistance    Lower Body Dressing: Total assistance    Toileting: Total assistance                ADL Intervention and task modifications:       Oriented to place, situation time, instructed on  use of call bell, repositioned at edge of bed to eat,however unable to keep balance and with posterior lean due to soft mattress and decreased use of right UE, repositioned in supine, head of bed ~80 degrees to eat in bed with head up, set-up tray and cues for portion control to swallow prior to additional bite, patient started coughing and RN informed and attending to patient. Instructed on positioning of right UE in elevation for edema control, total assist to reposition    Cognitive Retraining  Safety/Judgement: Decreased awareness of environment    Therapeutic Exercise:   Instructed to perform gross grasp and release right hand, wrist flexion and extension, completed 2 sets 10 reps with rest between with min assist this date, unable to tolerate ROM at shoulder and elbow     Functional Measure:  Barthel Index:    Bathin  Bladder: 0  Bowels: 10  Groomin  Dressin  Feedin  Mobility: 0  Stairs: 0  Toilet Use: 0  Transfer (Bed to Chair and Back): 0  Total: 15/100        The Barthel ADL Index: Guidelines  1. The index should be used as a record of what a patient does, not as a record of what a patient could do. 2. The main aim is to establish degree of independence from any help, physical or verbal, however minor and for whatever reason. 3. The need for supervision renders the patient not independent. 4. A patient's performance should be established using the best available evidence. Asking the patient, friends/relatives and nurses are the usual sources, but direct observation and common sense are also important. However direct testing is not needed. 5. Usually the patient's performance over the preceding 24-48 hours is important, but occasionally longer periods will be relevant. 6. Middle categories imply that the patient supplies over 50 per cent of the effort. 7. Use of aids to be independent is allowed. Jannet Vela, Barthel, D.W. ([id number]). Functional evaluation: the Barthel Index. [facility] (14)2. JESUS Gallagher, Yadiel Larkin., Ramon Ji., [place], [address] [place] [address] (). Measuring the change indisability after inpatient rehabilitation; comparison of the responsiveness of the Barthel Index and Functional [place] Measure. Journal of Neurology, Neurosurgery, and Psychiatry, 66(4), 171-226. Carter Sandhoff, N.J.A, AARON Buchanan, & Beto Faulkner M.A. (2004.) Assessment of post-stroke quality of life in cost-effectiveness studies: The usefulness of the Barthel Index and the EuroQoL-5D.  Quality of Life Research, 15, 198-97         Occupational Therapy Evaluation Charge Determination   History Examination Decision-Making   LOW Complexity : Brief history review  MEDIUM Complexity : 3-5 performance deficits relating to physical, cognitive , or psychosocial skils that result in activity limitations and / or participation restrictions MEDIUM Complexity : Patient may present with comorbidities that affect occupational performnce. Miniml to moderate modification of tasks or assistance (eg, physical or verbal ) with assesment(s) is necessary to enable patient to complete evaluation       Based on the above components, the patient evaluation is determined to be of the following complexity level: LOW   Pain Rating:  Not rated significant appears sharp and shooting right UE with any elbow or shoulder movement    Activity Tolerance:   Poor and due to pain on 3 liters on arrival and sats 98%   Please refer to the flowsheet for vital signs taken during this treatment. After treatment patient left in no apparent distress:    Supine in bed, Call bell within reach, and Side rails x 3    COMMUNICATION/EDUCATION:   The patients plan of care was discussed with: Physical Therapist, Registered Nurse, and . Patient is unable to participate in goal setting and plan of care. This patients plan of care is appropriate for delegation to [facility].     Thank you for this referral.  Hayden Ferrari OTR/L  Time Calculation: 40 mins

## 2019-11-29 NOTE — PROGRESS NOTES
PT orders acknowledged and chart reviewed. Pt noted to have just choked on food and is requiring increased stabilization s/p possible aspiration. Will continue to follow.      Dipesh Lazcano, DPT, PT

## 2019-11-29 NOTE — PROGRESS NOTES
Care Management Interventions  PCP Verified by CM: Yes  Palliative Care Criteria Met (RRAT>21 & CHF Dx)?: No  Mode of Transport at Discharge: Other (see comment)  MyChart Signup: No  Discharge Durable Medical Equipment: No  Health Maintenance Reviewed: Yes  Physical Therapy Consult: Yes  Occupational Therapy Consult: Yes  Speech Therapy Consult: No  Current Support Network: Assisted Living  Confirm Follow Up Transport: Family  Plan discussed with Pt/Family/Caregiver: Yes   Resource Information Provided?: No  Discharge Location  Discharge Placement: Unable to determine at this time    Reason for Admission:   Cellulitis of right arm                   RRAT Score:          5           Plan for utilizing home health:      TBD. Current Advanced Directive/Advance Care Plan:  Not on file. Transition of Care Plan:                    Reviewed chart for transitions of care,and discussed in rounds. CM spoke with patient son, Jo Geronimo. He confirmed that his father lives at [address] [place] [address] and uses a walker for ambulation prior to admission. Cm discussed possibility of SNF, he responded with \" We will see. \" CM informed him that there would be contact further in regards to discharge planning, he is agreeable. CM noted patient is confused, and the son is emergency contact. CM to follow.     Adia Ojeda [facility]

## 2019-11-29 NOTE — PROGRESS NOTES
CM attempted to complete full assessment with son, Andreea Ceja and left voicemail, [phone]. CM to follow.       [facility]

## 2019-11-29 NOTE — PROGRESS NOTES
Problem: Dysphagia (Adult)  Goal: *Acute Goals and Plan of Care (Insert Text)  Description  Speech Therapy Goals  Initiated 11/29/2019  1. Patient will tolerate medication crushed in puree without overt s/s aspiration within 7 days. 2.  Patient will participate in re-evaluation of swallow function within 7 days. Outcome: Progressing Towards Goal     SPEECH LANGUAGE PATHOLOGY BEDSIDE SWALLOW EVALUATION  Patient: Jessa Hicks (77 y.o. male)  Date: 11/29/2019  Primary Diagnosis: Cellulitis of right arm [L03.113]        Precautions: Aspiration  Skin, Fall    ASSESSMENT :  Based on the objective data described below, the patient presents with moderate-severe oral and moderate pharyngeal dysphagia characterized by impaired bolus retrieval, delayed oral transit, delayed/absent pharyngeal swallow. Patient limited by lethargy this date and required verbal and tactile cues to initiate pharyngeal swallow. Patient accepting applesauce and opening mouth for more before pharyngeal swallow initiated. Given time and cues patient able to initiate pharyngeal swallow. With thin liquids patient unable to extract liquid through straw. No overt s/s aspiration with puree or medication crushed in puree. Suspect swallow skills may improve as alertness and respiratory status improves. Given presentation at this time feel patient is safe for medication crushed in puree. May complete STAND over weekend if alertness and respiratory improves. Patient will benefit from skilled intervention to address the above impairments.   Patients rehabilitation potential is considered to be Good  Factors which may influence rehabilitation potential include:   []            None noted  [x]            Mental ability/status  []            Medical condition  []            Home/family situation and support systems  []            Safety awareness  []            Pain tolerance/management  []            Other:      PLAN :  Recommendations and Planned Interventions:  Medication crushed in puree  Ensure patient swallows after each bite  No talking while eating  Nursing may complete STAND over weekend if alertness/respiratory status improves  Frequency/Duration: Patient will be followed by speech-language pathology 3 times a week to address goals. Discharge Recommendations: To Be Determined     SUBJECTIVE:   Patient in bed, eyes closed but attempts to follow some commands. Per RN and therapy patient   eating breakfast and demonstrated choking when tried to talk while eating. Son reports patient   required thickened liquids for a short period of time after he broke his hip but returned regular diet. Patient requiring non-rebreather after choking however able to come off to 5L for assessment. SpO2 100%    OBJECTIVE:     Past Medical History:   Diagnosis Date    Atrial fibrillation ([facility] [address]     Chronic obstructive pulmonary disease ([facility] [address]     Hypertension    History reviewed. No pertinent surgical history. Prior Level of Function/Home Situation:      Diet prior to admission: Regular  Current Diet:  Regular but RN holding tray   Cognitive and Communication Status:  Neurologic State: Drowsy  Orientation Level: Oriented to person, Disoriented to place, Disoriented to situation, Disoriented to time  Cognition: Decreased command following, Decreased attention/concentration  Perception: Appears intact  Perseveration: No perseveration noted  Safety/Judgement: Decreased awareness of environment  Oral Assessment:  Oral Assessment  Labial: (Unable to assess due to poor command following)  P.O. Trials:  Patient Position: Upright in bed  Vocal quality prior to P.O.:    Consistency Presented: Puree; Thin liquid  How Presented: SLP-fed/presented;Spoon;Straw     Bolus Acceptance: Impaired  Bolus Formation/Control: Impaired  Type of Impairment: Delayed;Premature spillage  Propulsion: Delayed (# of seconds)  Oral Residue: None  Initiation of Swallow: Delayed (# of seconds)  Laryngeal Elevation: Functional  Aspiration Signs/Symptoms: None                Oral Phase Severity: Moderate-severe  Pharyngeal Phase Severity : Moderate    NOMS:   The NOMS functional outcome measure was used to quantify this patient's level of swallowing impairment. Based on the NOMS, the patient was determined to be at level 2 for swallow function       NOMS Swallowing Levels:  Level 1 (CN): NPO  Level 2 (CM): NPO but takes consistency in therapy  Level 3 (CL): Takes less than 50% of nutrition p.o. and continues with nonoral feedings; and/or safe with mod cues; and/or max diet restriction  Level 4 (CK): Safe swallow but needs mod cues; and/or mod diet restriction; and/or still requires some nonoral feeding/supplements  Level 5 (CJ): Safe swallow with min diet restriction; and/or needs min cues  Level 6 (CI): Independent with p.o.; rare cues; usually self cues; may need to avoid some foods or needs extra time  Level 7 ([address] [place] [address]): Independent for all p.o.  JIGNESH. (2003). National Outcomes Measurement System (NOMS): Adult Speech-Language Pathology User's Guide. After treatment:   []            Patient left in no apparent distress sitting up in chair  [x]            Patient left in no apparent distress in bed  [x]            Call bell left within reach  [x]            Nursing notified  []            Caregiver present  []            Bed alarm activated    COMMUNICATION/EDUCATION:   The patients plan of care including recommendations, planned interventions, and recommended diet changes were discussed with: Registered Nurse. Patient was educated regarding role of SLP and POC. []            Posted safety precautions in patient's room. []            Patient/family have participated as able in goal setting and plan of care. []            Patient/family agree to work toward stated goals and plan of care.   []            Patient understands intent and goals of therapy, but is neutral about his/her participation. []            Patient is unable to participate in goal setting and plan of care.     Thank you for this referral.  MURPHY Breaux  Time Calculation: 16 mins

## 2019-11-30 LAB
ANION GAP SERPL CALC-SCNC: 1 MMOL/L (ref 5–15)
BASOPHILS # BLD: 0 K/UL (ref 0–0.1)
BASOPHILS NFR BLD: 0 % (ref 0–1)
BUN SERPL-MCNC: 20 MG/DL (ref 6–20)
BUN/CREAT SERPL: 21 (ref 12–20)
CALCIUM SERPL-MCNC: 9.7 MG/DL (ref 8.5–10.1)
CHLORIDE SERPL-SCNC: 104 MMOL/L (ref 97–108)
CO2 SERPL-SCNC: 35 MMOL/L (ref 21–32)
CREAT SERPL-MCNC: 0.96 MG/DL (ref 0.7–1.3)
DATE LAST DOSE: ABNORMAL
DIFFERENTIAL METHOD BLD: ABNORMAL
EOSINOPHIL # BLD: 0 K/UL (ref 0–0.4)
EOSINOPHIL NFR BLD: 0 % (ref 0–7)
ERYTHROCYTE [DISTWIDTH] IN BLOOD BY AUTOMATED COUNT: 13 % (ref 11.5–14.5)
GLUCOSE SERPL-MCNC: 126 MG/DL (ref 65–100)
HCT VFR BLD AUTO: 49.8 % (ref 36.6–50.3)
HGB BLD-MCNC: 15 G/DL (ref 12.1–17)
IMM GRANULOCYTES # BLD AUTO: 0.1 K/UL (ref 0–0.04)
IMM GRANULOCYTES NFR BLD AUTO: 1 % (ref 0–0.5)
INR PPP: 2.7 (ref 0.9–1.1)
LYMPHOCYTES # BLD: 0.8 K/UL (ref 0.8–3.5)
LYMPHOCYTES NFR BLD: 6 % (ref 12–49)
MCH RBC QN AUTO: 30.9 PG (ref 26–34)
MCHC RBC AUTO-ENTMCNC: 30.1 G/DL (ref 30–36.5)
MCV RBC AUTO: 102.5 FL (ref 80–99)
MONOCYTES # BLD: 0.8 K/UL (ref 0–1)
MONOCYTES NFR BLD: 6 % (ref 5–13)
NEUTS SEG # BLD: 12.4 K/UL (ref 1.8–8)
NEUTS SEG NFR BLD: 87 % (ref 32–75)
NRBC # BLD: 0 K/UL (ref 0–0.01)
NRBC BLD-RTO: 0 PER 100 WBC
PLATELET # BLD AUTO: 256 K/UL (ref 150–400)
PMV BLD AUTO: 10.1 FL (ref 8.9–12.9)
POTASSIUM SERPL-SCNC: 4.4 MMOL/L (ref 3.5–5.1)
PROTHROMBIN TIME: 25.7 SEC (ref 9–11.1)
RBC # BLD AUTO: 4.86 M/UL (ref 4.1–5.7)
RBC MORPH BLD: ABNORMAL
REPORTED DOSE,DOSE: ABNORMAL UNITS
REPORTED DOSE/TIME,TMG: ABNORMAL
SODIUM SERPL-SCNC: 140 MMOL/L (ref 136–145)
VANCOMYCIN TROUGH SERPL-MCNC: 13.1 UG/ML (ref 5–10)
WBC # BLD AUTO: 14.1 K/UL (ref 4.1–11.1)

## 2019-11-30 PROCEDURE — 74011000250 HC RX REV CODE- 250: Performed by: INTERNAL MEDICINE

## 2019-11-30 PROCEDURE — 85025 COMPLETE CBC W/AUTO DIFF WBC: CPT

## 2019-11-30 PROCEDURE — 80202 ASSAY OF VANCOMYCIN: CPT

## 2019-11-30 PROCEDURE — 85610 PROTHROMBIN TIME: CPT

## 2019-11-30 PROCEDURE — 65270000029 HC RM PRIVATE

## 2019-11-30 PROCEDURE — 74011250636 HC RX REV CODE- 250/636: Performed by: HOSPITALIST

## 2019-11-30 PROCEDURE — 94640 AIRWAY INHALATION TREATMENT: CPT

## 2019-11-30 PROCEDURE — 80048 BASIC METABOLIC PNL TOTAL CA: CPT

## 2019-11-30 PROCEDURE — 74011250636 HC RX REV CODE- 250/636: Performed by: INTERNAL MEDICINE

## 2019-11-30 PROCEDURE — 74011000258 HC RX REV CODE- 258: Performed by: HOSPITALIST

## 2019-11-30 PROCEDURE — 74011250637 HC RX REV CODE- 250/637: Performed by: HOSPITALIST

## 2019-11-30 PROCEDURE — 36415 COLL VENOUS BLD VENIPUNCTURE: CPT

## 2019-11-30 PROCEDURE — 74011250637 HC RX REV CODE- 250/637: Performed by: INTERNAL MEDICINE

## 2019-11-30 RX ORDER — BUDESONIDE 0.5 MG/2ML
500 INHALANT ORAL
Status: DISCONTINUED | OUTPATIENT
Start: 2019-11-30 | End: 2019-12-14 | Stop reason: HOSPADM

## 2019-11-30 RX ORDER — VANCOMYCIN HYDROCHLORIDE
1250
Status: DISCONTINUED | OUTPATIENT
Start: 2019-12-01 | End: 2019-12-03

## 2019-11-30 RX ORDER — ARFORMOTEROL TARTRATE 15 UG/2ML
15 SOLUTION RESPIRATORY (INHALATION)
Status: DISCONTINUED | OUTPATIENT
Start: 2019-11-30 | End: 2019-12-14 | Stop reason: HOSPADM

## 2019-11-30 RX ORDER — IPRATROPIUM BROMIDE AND ALBUTEROL SULFATE 2.5; .5 MG/3ML; MG/3ML
3 SOLUTION RESPIRATORY (INHALATION)
Status: DISCONTINUED | OUTPATIENT
Start: 2019-11-30 | End: 2019-12-01

## 2019-11-30 RX ADMIN — Medication 10 ML: at 22:06

## 2019-11-30 RX ADMIN — Medication 100 MG: at 09:37

## 2019-11-30 RX ADMIN — Medication 10 ML: at 06:28

## 2019-11-30 RX ADMIN — AMLODIPINE BESYLATE 5 MG: 5 TABLET ORAL at 09:37

## 2019-11-30 RX ADMIN — ATORVASTATIN CALCIUM 40 MG: 40 TABLET, FILM COATED ORAL at 09:37

## 2019-11-30 RX ADMIN — METOPROLOL TARTRATE 50 MG: 50 TABLET, FILM COATED ORAL at 17:20

## 2019-11-30 RX ADMIN — IPRATROPIUM BROMIDE AND ALBUTEROL SULFATE 3 ML: .5; 3 SOLUTION RESPIRATORY (INHALATION) at 10:00

## 2019-11-30 RX ADMIN — ASPIRIN 81 MG: 81 TABLET, COATED ORAL at 09:37

## 2019-11-30 RX ADMIN — Medication 10 ML: at 00:46

## 2019-11-30 RX ADMIN — DILTIAZEM HYDROCHLORIDE 30 MG: 30 TABLET, FILM COATED ORAL at 11:56

## 2019-11-30 RX ADMIN — WARFARIN SODIUM 3 MG: 2 TABLET ORAL at 19:23

## 2019-11-30 RX ADMIN — IPRATROPIUM BROMIDE AND ALBUTEROL SULFATE 3 ML: .5; 3 SOLUTION RESPIRATORY (INHALATION) at 14:40

## 2019-11-30 RX ADMIN — LISINOPRIL 10 MG: 10 TABLET ORAL at 09:38

## 2019-11-30 RX ADMIN — Medication 1 CAPSULE: at 11:56

## 2019-11-30 RX ADMIN — DILTIAZEM HYDROCHLORIDE 30 MG: 30 TABLET, FILM COATED ORAL at 06:32

## 2019-11-30 RX ADMIN — FUROSEMIDE 20 MG: 20 TABLET ORAL at 09:38

## 2019-11-30 RX ADMIN — CEFTRIAXONE 1 G: 1 INJECTION, POWDER, FOR SOLUTION INTRAMUSCULAR; INTRAVENOUS at 19:23

## 2019-11-30 RX ADMIN — VANCOMYCIN HYDROCHLORIDE 1250 MG: 10 INJECTION, POWDER, LYOPHILIZED, FOR SOLUTION INTRAVENOUS at 22:05

## 2019-11-30 RX ADMIN — DILTIAZEM HYDROCHLORIDE 30 MG: 30 TABLET, FILM COATED ORAL at 17:20

## 2019-11-30 RX ADMIN — METOPROLOL TARTRATE 50 MG: 50 TABLET, FILM COATED ORAL at 09:37

## 2019-11-30 RX ADMIN — SODIUM CHLORIDE 125 ML/HR: 900 INJECTION, SOLUTION INTRAVENOUS at 09:38

## 2019-11-30 RX ADMIN — BUDESONIDE 500 MCG: 0.5 INHALANT RESPIRATORY (INHALATION) at 10:00

## 2019-11-30 RX ADMIN — VANCOMYCIN HYDROCHLORIDE 1250 MG: 10 INJECTION, POWDER, LYOPHILIZED, FOR SOLUTION INTRAVENOUS at 04:39

## 2019-11-30 NOTE — PROGRESS NOTES
At approximately 2215 Pt. Intentionally pulled out his IV line and threw his oxygen nasal cannula on the floor. Pt. Is alert but confused. This nurse was able to talk to the pt. To put his new nasal cannula on his face. This nurse explained to the pt. That the oxygen tube is making it easier to breathe and must keep it on . Pt. Agreed not to take the nasal cannula off again. This nurse sought help to have another IV placed for the pt. Pt is a difficult stick to have another IV inserted. Will continue to monitor pt. Closely for any changes in his condition.

## 2019-11-30 NOTE — PROGRESS NOTES
Pharmacist Note - Warfarin Dosing  Consult provided for this 84 y.o.male to manage warfarin for Atrial Fibrillation    INR Goal: 2 - 3    Home regimen/ tablet size: 4 mg daily    Drugs that may increase INR: None  Drugs that may decrease INR: None  Other current anticoagulants/ drugs that may increase bleeding risk: Aspirin  Risk factors: Age > 65  Daily INR ordered: YES    Recent Labs     11/30/19  0040 11/29/19  0534 11/28/19  0731  11/27/19  1520   HGB 15.0 13.0  --   --  13.4   INR 2.7* 3.0* 2.6*   < >  --     < > = values in this interval not displayed. Date               INR                  Dose  11/27  2.7  4 mg (took PTA)   11/28  2.6  4 mg   11/29  3  2.5 mg   11/30               2.7                  3 mg                                                                               Assessment/ Plan: Will order warfarin 3 mg x 1 today. Pharmacy will continue to monitor daily and adjust therapy as indicated. Please contact the pharmacist at  for outpatient recommendations if needed.

## 2019-11-30 NOTE — PROGRESS NOTES
[facility] Adult  Hospitalist Group                                                                                          Hospitalist Progress Note  Chayo Valentino MD  Answering service: [phone] OR [phone] from in house phone        Date of Service:  2019  NAME:  Cj Randolph  :  1935  MRN:  [id number]      Admission Summary:       CC: altered mental state        HPI: 81 y.o man w/ afib, CVA, HTN, COPD, probable dementia, nursing home resident, who presents with AMS. He was reportedly more confused than baseline during the day. He was seen by [facility] and was diagnosed with a UTI based on a urine dipstick and cellulitis of the right arm. He was sent to the ED due to the increase in confusion. His son describes that he is confused at baseline and the severity is variable, and that he is back at baseline currently. He has not been formally diagnosed with dementia. No known fever or pain. The patient denies any complaints but he is a poor historian.       Interval history / Subjective:       2019 : RN able to get pt to eat w/o chocking. Wheezing this am is likely baseline. Will add nebs routinely        Assessment & Plan:     UTI: (POA) this is based on an outpatient urine dipstick that showed +++ leukocyte esterase and + nitrite. We have been unable to obtain a urine specimen here so far. -send urine specimen when provided. Already received IV ceftriaxone which will be continued- no changes      COPD adding routine nebs.      R arm cellulitis with R elbow effusion:  -s/p arthrocentesis by ortho. Studies sent. D/w ortho keep NPO for now until cell counts return  -will place on IV vancomycin for now - no changes   - 2019 as to rx. The cellulitic changes retracting from line of demarcation drawn      Acute aspiration for speech eval     AMS: likely metabolic encephalopathy on chronic dementia though seems to have resolved per the family.  Monitor    Acute agitation as an exacerbation of dementia 11/28 pm. Haldol x 1 dose given      Chronic atrial fibrillation:  -continue diltiazem, metoprolol and warfarin     COPD w/ chronic hypoxic respiratory failure: stable  -continue O2  -PRN inhaled bronchodilators    DNR status, confirmed by review of DDNR     HTN:  -continue lisinopril, diltiazem, metoprolol - controlled   BP Readings from Last 1 Encounters:   11/30/19 165/85         History of alcohol abuse: remote, resolved per son     History of CVA:  -continue aspirin     Normally gets his care in the [facility] [place] [place] system per his son     DVT ppx: anticoagulated  Code status: DNR - son states he has a DDNR at home  Disposition: prior living arrangement with ready          Care Plan discussed with: Patient/Family  Disposition: TBD     Hospital Problems  Never Reviewed          Codes Class Noted POA    Cellulitis of right arm ICD-10-CM: L03.113  ICD-9-CM: 682.3  11/27/2019 Unknown                Review of Systems:   Review of systems not obtained due to patient factors. Vital Signs:    Last 24hrs VS reviewed since prior progress note. Most recent are:  Visit Vitals  /85   Pulse 98   Temp 97.4 °F (36.3 °C)   Resp 16   Ht 5' 10\" (1.778 m)   Wt 81.6 kg (179 lb 14.3 oz)   SpO2 97%   BMI 25.81 kg/m²       No intake or output data in the 24 hours ending 11/30/19 1068     Physical Examination:             Constitutional:  No acute distress,     ENT:  Oral mucous moist, oropharynx benign. Resp:  diminished but else   No wheezing/rhonchi/rales. No accessory muscle use   CV:  Regular rhythm, normal rate, no murmurs, gallops, rubs    GI:  Soft, non distended, non tender. normoactive bowel sounds, no hepatosplenomegaly     Musculoskeletal:  No edema, warm, 2+ pulses throughout    Neurologic:  Moves all extremities.   Demented      Psych:  poor insight, not agitated not anxious   Skin:  Poor turgor, cellulitis rt arm to shoulder ( cellulitis retracting from line of demarcation) Data Review:    Review and/or order of clinical lab test      Labs:     Recent Labs     11/30/19  0040 11/29/19  0534   WBC 14.1* 12.2*   HGB 15.0 13.0   HCT 49.8 43.6    207     Recent Labs     11/30/19  0040 11/29/19  0534 11/28/19  0731    140 139   K 4.4 3.8 4.2    107 102   CO2 35* 31 34*   BUN 20 14 17   CREA 0.96 0.73 0.91   * 93 91   CA 9.7 8.1* 9.3     Recent Labs     11/27/19  1520   SGOT 18   ALT 22   AP 82   TBILI 0.9   TP 6.9   ALB 3.3*   GLOB 3.6     Recent Labs     11/30/19  0040 11/29/19  0534 11/28/19  0731   INR 2.7* 3.0* 2.6*   PTP 25.7* 28.3* 25.0*      No results for input(s): FE, TIBC, PSAT, FERR in the last 72 hours. No results found for: FOL, RBCF   No results for input(s): PH, PCO2, PO2 in the last 72 hours. Recent Labs     11/27/19  1520   TROIQ <0.05     No results found for: CHOL, CHOLX, CHLST, CHOLV, HDL, HDLP, LDL, LDLC, DLDLP, TGLX, TRIGL, TRIGP, CHHD, CHHDX  No results found for: [facility]  Lab Results   Component Value Date/Time    Color YELLOW/STRAW 11/27/2019 09:08 PM    Appearance CLOUDY (A) 11/27/2019 09:08 PM    Specific gravity 1.020 11/27/2019 09:08 PM    pH (UA) 6.0 11/27/2019 09:08 PM    Protein 100 (A) 11/27/2019 09:08 PM    Glucose NEGATIVE  11/27/2019 09:08 PM    Ketone TRACE (A) 11/27/2019 09:08 PM    Bilirubin NEGATIVE  11/27/2019 09:08 PM    Urobilinogen 1.0 11/27/2019 09:08 PM    Nitrites NEGATIVE  11/27/2019 09:08 PM    Leukocyte Esterase LARGE (A) 11/27/2019 09:08 PM    Epithelial cells FEW 11/27/2019 09:08 PM    Bacteria NEGATIVE  11/27/2019 09:08 PM    WBC >100 (H) 11/27/2019 09:08 PM    RBC >100 (H) 11/27/2019 09:08 PM         Medications Reviewed:     Current Facility-Administered Medications   Medication Dose Route Frequency    Vancomycin trough - due 11/30 prior to 2200 dose. Thanks!    Other ONCE    warfarin (COUMADIN) tablet 3 mg  3 mg Oral ONCE    albuterol-ipratropium (DUO-NEB) 2.5 MG-0.5 MG/3 ML  3 mL Nebulization Q6H RT  lactobac ac& pc-s.therm-b.anim (HELADIO Q/RISAQUAD)  1 Cap Oral DAILY    arformoterol (BROVANA) neb solution 15 mcg  15 mcg Nebulization BID RT    And    budesonide (PULMICORT) 500 mcg/2 ml nebulizer suspension  500 mcg Nebulization BID RT    albuterol-ipratropium (DUO-NEB) 2.5 MG-0.5 MG/3 ML  3 mL Nebulization Q4H PRN    traMADol (ULTRAM) tablet 50 mg  50 mg Oral Q6H PRN    fentaNYL citrate (PF) injection 25 mcg  25 mcg IntraVENous Q4H PRN    hydrALAZINE (APRESOLINE) 20 mg/mL injection 20 mg  20 mg IntraVENous Q6H PRN    lisinopril (PRINIVIL, ZESTRIL) tablet 10 mg  10 mg Oral DAILY    amLODIPine (NORVASC) tablet 5 mg  5 mg Oral DAILY    0.9% sodium chloride infusion  125 mL/hr IntraVENous CONTINUOUS    sodium chloride (NS) flush 5-40 mL  5-40 mL IntraVENous Q8H    sodium chloride (NS) flush 5-40 mL  5-40 mL IntraVENous PRN    acetaminophen (TYLENOL) tablet 650 mg  650 mg Oral Q4H PRN    ondansetron (ZOFRAN) injection 4 mg  4 mg IntraVENous Q4H PRN    cefTRIAXone (ROCEPHIN) 1 g in 0.9% sodium chloride (MBP/ADV) 50 mL  1 g IntraVENous Q24H    aspirin delayed-release tablet 81 mg  81 mg Oral DAILY    atorvastatin (LIPITOR) tablet 40 mg  40 mg Oral DAILY    dilTIAZem (CARDIZEM) IR tablet 30 mg  30 mg Oral TIDAC    furosemide (LASIX) tablet 20 mg  20 mg Oral DAILY    metoprolol tartrate (LOPRESSOR) tablet 50 mg  50 mg Oral BID    thiamine HCL (B-1) tablet 100 mg  100 mg Oral DAILY    Warfarin - pharmacy to dose   Other Rx Dosing/Monitoring    Vancomycin - pharmacy to dose   Other Rx Dosing/Monitoring    vancomycin (VANCOCIN) 1250 mg in  ml infusion  1,250 mg IntraVENous Q18H     ______________________________________________________________________  EXPECTED LENGTH OF STAY: - - -  ACTUAL LENGTH OF STAY:          3                 Tori Muniz MD

## 2019-11-30 NOTE — PROGRESS NOTES
Ortho:  Drowsy but awakens to voice. No complaints. Afebrile. RUE erythema seems improving but edema persistent and now extending into hand. PROM 30-90. Strong radial pulse. Blood cultures and elbow joint aspirate NGTD. RUE cellulitis. No evidence of septic elbow joint. Cont abx.   I elevated RUE with pillow under elbow and second pillow on his abdomen for hand/wrist.    Kesha Wade PA

## 2019-12-01 LAB
ANION GAP SERPL CALC-SCNC: 4 MMOL/L (ref 5–15)
BUN SERPL-MCNC: 17 MG/DL (ref 6–20)
BUN/CREAT SERPL: 21 (ref 12–20)
CALCIUM SERPL-MCNC: 9.2 MG/DL (ref 8.5–10.1)
CHLORIDE SERPL-SCNC: 107 MMOL/L (ref 97–108)
CO2 SERPL-SCNC: 31 MMOL/L (ref 21–32)
CREAT SERPL-MCNC: 0.81 MG/DL (ref 0.7–1.3)
GLUCOSE SERPL-MCNC: 126 MG/DL (ref 65–100)
INR PPP: 4.2 (ref 0.9–1.1)
POTASSIUM SERPL-SCNC: 3.7 MMOL/L (ref 3.5–5.1)
PROTHROMBIN TIME: 39.7 SEC (ref 9–11.1)
SODIUM SERPL-SCNC: 142 MMOL/L (ref 136–145)

## 2019-12-01 PROCEDURE — 85610 PROTHROMBIN TIME: CPT

## 2019-12-01 PROCEDURE — 74011000250 HC RX REV CODE- 250: Performed by: INTERNAL MEDICINE

## 2019-12-01 PROCEDURE — 74011000258 HC RX REV CODE- 258: Performed by: HOSPITALIST

## 2019-12-01 PROCEDURE — 74011250637 HC RX REV CODE- 250/637: Performed by: INTERNAL MEDICINE

## 2019-12-01 PROCEDURE — 77010033678 HC OXYGEN DAILY

## 2019-12-01 PROCEDURE — 36415 COLL VENOUS BLD VENIPUNCTURE: CPT

## 2019-12-01 PROCEDURE — 65270000029 HC RM PRIVATE

## 2019-12-01 PROCEDURE — 94640 AIRWAY INHALATION TREATMENT: CPT

## 2019-12-01 PROCEDURE — 74011250636 HC RX REV CODE- 250/636: Performed by: HOSPITALIST

## 2019-12-01 PROCEDURE — 74011250636 HC RX REV CODE- 250/636: Performed by: INTERNAL MEDICINE

## 2019-12-01 PROCEDURE — 80048 BASIC METABOLIC PNL TOTAL CA: CPT

## 2019-12-01 PROCEDURE — 74011250637 HC RX REV CODE- 250/637: Performed by: HOSPITALIST

## 2019-12-01 RX ORDER — IPRATROPIUM BROMIDE AND ALBUTEROL SULFATE 2.5; .5 MG/3ML; MG/3ML
3 SOLUTION RESPIRATORY (INHALATION)
Status: DISCONTINUED | OUTPATIENT
Start: 2019-12-01 | End: 2019-12-07

## 2019-12-01 RX ADMIN — METOPROLOL TARTRATE 50 MG: 50 TABLET, FILM COATED ORAL at 17:03

## 2019-12-01 RX ADMIN — SODIUM CHLORIDE 125 ML/HR: 900 INJECTION, SOLUTION INTRAVENOUS at 17:05

## 2019-12-01 RX ADMIN — DILTIAZEM HYDROCHLORIDE 30 MG: 30 TABLET, FILM COATED ORAL at 11:47

## 2019-12-01 RX ADMIN — Medication 10 ML: at 06:45

## 2019-12-01 RX ADMIN — BUDESONIDE 500 MCG: 0.5 INHALANT RESPIRATORY (INHALATION) at 22:26

## 2019-12-01 RX ADMIN — METOPROLOL TARTRATE 50 MG: 50 TABLET, FILM COATED ORAL at 09:24

## 2019-12-01 RX ADMIN — Medication 1 CAPSULE: at 09:24

## 2019-12-01 RX ADMIN — DILTIAZEM HYDROCHLORIDE 30 MG: 30 TABLET, FILM COATED ORAL at 17:03

## 2019-12-01 RX ADMIN — CEFTRIAXONE 1 G: 1 INJECTION, POWDER, FOR SOLUTION INTRAMUSCULAR; INTRAVENOUS at 18:09

## 2019-12-01 RX ADMIN — Medication 100 MG: at 09:24

## 2019-12-01 RX ADMIN — IPRATROPIUM BROMIDE AND ALBUTEROL SULFATE 3 ML: .5; 3 SOLUTION RESPIRATORY (INHALATION) at 08:20

## 2019-12-01 RX ADMIN — DILTIAZEM HYDROCHLORIDE 30 MG: 30 TABLET, FILM COATED ORAL at 06:44

## 2019-12-01 RX ADMIN — VANCOMYCIN HYDROCHLORIDE 1250 MG: 10 INJECTION, POWDER, LYOPHILIZED, FOR SOLUTION INTRAVENOUS at 15:20

## 2019-12-01 RX ADMIN — BUDESONIDE 500 MCG: 0.5 INHALANT RESPIRATORY (INHALATION) at 08:20

## 2019-12-01 RX ADMIN — ATORVASTATIN CALCIUM 40 MG: 40 TABLET, FILM COATED ORAL at 09:24

## 2019-12-01 RX ADMIN — ARFORMOTEROL TARTRATE 15 MCG: 15 SOLUTION RESPIRATORY (INHALATION) at 22:25

## 2019-12-01 RX ADMIN — FUROSEMIDE 20 MG: 20 TABLET ORAL at 09:24

## 2019-12-01 RX ADMIN — ASPIRIN 81 MG: 81 TABLET, COATED ORAL at 09:26

## 2019-12-01 RX ADMIN — LISINOPRIL 10 MG: 10 TABLET ORAL at 09:24

## 2019-12-01 RX ADMIN — AMLODIPINE BESYLATE 5 MG: 5 TABLET ORAL at 09:24

## 2019-12-01 NOTE — PROGRESS NOTES
Pharmacist Note - Vancomycin Dosing  Therapy day 4  Indication: R elbow cellulitis with possible septic arthritis; +/- UTI  Current regimen: 1250 mg Q18h    A Trough Level resulted at 13.1 mcg/mL which was obtained ~17 hrs post-dose. The extrapolated \"true\" trough is approximately 12.3 mcg/mL based on the patient's known kinetics. Goal trough: 15 - 20 mcg/mL     Plan: Change to 1250 mg Q 16h . Pharmacy will continue to monitor this patient daily for changes in clinical status and renal function.

## 2019-12-01 NOTE — PROGRESS NOTES
ADULT PROTOCOL: JET AEROSOL  REASSESSMENT    Patient  Lorie Gillis     81 y.o.   male     12/1/2019  9:22 AM    Breath Sounds Pre Procedure: Right Breath Sounds: Diminished                               Left Breath Sounds: Diminished    Breath Sounds Post Procedure: Right Breath Sounds: Diminished                                 Left Breath Sounds: Diminished    Breathing pattern: Pre procedure Breathing Pattern: Regular          Post procedure Breathing Pattern: Regular    Heart Rate: Pre procedure Pulse: 79           Post procedure Pulse: 89    Resp Rate: Pre procedure Respirations: 16           Post procedure Respirations: 20  e      Suctioned: NO    Sputum: Pre procedure                   Post procedure      Oxygen: O2 Device: Nasal cannula   Flow rate (L/min) 3     Changed: NO    SpO2: Pre procedure SpO2: 96 %   With oxygen              Post procedure SpO2: 95 %  with oxygen    Nebulizer Therapy: Current medications Aerosolized Medications: DuoNeb Pulmicort      Changed: YES    Smoking History: Former Smoker         Problem List:   Patient Active Problem List   Diagnosis Code    Cellulitis of right arm L03. [facility]       Respiratory Therapist: Jamie Amado

## 2019-12-01 NOTE — PROGRESS NOTES
Pharmacist Note - Warfarin Dosing  Consult provided for this 84 y.o.male to manage warfarin for Atrial Fibrillation    INR Goal: 2 - 3    Home regimen/ tablet size: 4 mg daily    Drugs that may increase INR: None  Drugs that may decrease INR: None  Other current anticoagulants/ drugs that may increase bleeding risk: Aspirin  Risk factors: Age > 65  Daily INR ordered: YES    Recent Labs     12/01/19  0209 11/30/19  0040 11/29/19  0534   HGB  --  15.0 13.0   INR 4.2* 2.7* 3.0*     Date               INR                  Dose  11/27  2.7  4 mg (took PTA)   11/28  2.6  4 mg   11/29  3  2.5 mg   11/30               2.7                  3 mg  12/1                 4.2                  HOLD                                                                               Assessment/ Plan: Will HOLD the warfarin today for an INR of 4.2. Pharmacy will continue to monitor daily and adjust therapy as indicated. Please contact the pharmacist at  for outpatient recommendations if needed.

## 2019-12-01 NOTE — PROGRESS NOTES
ORTHO PROGRESS NOTE      SUBJECTIVE:  Anjana Pendleton is drowsy and has no complaints. OBJECTIVE:  Patient Vitals for the past 24 hrs:   Temp Pulse BP   12/01/19 0753 97.3 °F (36.3 °C) 100 157/73   12/01/19 0205 98 °F (36.7 °C) 84 168/84   11/30/19 1953 98 °F (36.7 °C) 85 112/62   11/30/19 1427 98 °F (36.7 °C) 92 131/75       Sleeping but awakens to voice, no distress. Lying in bed. No family present. Doesn't really follow commands. Respirations unlabored. RUE still erythema focused elbow. Now more erythema anterior elbow at previous site [facility] IV access. Erythema receding proximally. Pitting soft tissue edema elbow extending to hand. No pain with PROM elbow 45-90. Recent Labs     12/01/19  0209 11/30/19  0040   HGB  --  15.0   HCT  --  49.8   PLT  --  256   INR 4.2* 2.7*   BUN 17 20   CREA 0.81 0.96   GFRAA >60 >60   GFRNA >60 >60     WBC 14.1  Elbow aspirate 11/27 NGTD. Blood cultures 11/27 NGTD. ASSESSMENT:  RUE cellulitis. No evidence of septic joint. PLAN:  Cont elevation and IV abx. No plans for surgical intervention. D/W Dr. Gunter who agrees.     MACKENZIE Peng  Orthopedic Trauma Service  [facility]

## 2019-12-01 NOTE — PROGRESS NOTES
[facility] Adult  Hospitalist Group                                                                                          Hospitalist Progress Note  Janes Corado MD  Answering service: [phone] OR [phone] from in house phone        Date of Service:  2019  NAME:  Natalia Root  :  1935  MRN:  [id number]      Admission Summary:       CC: altered mental state        HPI: 81 y.o man w/ afib, CVA, HTN, COPD, probable dementia, nursing home resident, who presents with AMS. He was reportedly more confused than baseline during the day. He was seen by [facility] and was diagnosed with a UTI based on a urine dipstick and cellulitis of the right arm. He was sent to the ED due to the increase in confusion. His son describes that he is confused at baseline and the severity is variable, and that he is back at baseline currently. He has not been formally diagnosed with dementia. No known fever or pain. The patient denies any complaints but he is a poor historian.       Interval history / Subjective:       2019 : somnolent most of day  but for 2 hours more like self   Today is somnolent, pt not on psychotropics currently;   Cellulitic changes stable /improved, less pain on ROM efforts LUE. Assessment & Plan:     UTI: (POA) this is based on an outpatient urine dipstick that showed +++ leukocyte esterase and + nitrite. We have been unable to obtain a urine specimen here so far. -send urine specimen when provided. Already received IV ceftriaxone which will be continued- no changes  - 2019     COPD adding routine nebs. No wheezing 2019      R arm cellulitis with R elbow effusion:  -s/p arthrocentesis by ortho. Studies sent. D/w ortho keep NPO for now until cell counts return  -will place on IV vancomycin for now - no changes   - 2019 as to rx.  The cellulitic changes retracting from line of demarcation drawn      Acute aspiration for speech eval     AMS: likely metabolic encephalopathy on chronic dementia though seems to have resolved per the family. Monitor- moslty somnolent last couple of days. Acute agitation as an exacerbation of dementia 11/28 pm. Haldol x 1 dose given      Chronic atrial fibrillation:  -continue diltiazem, metoprolol and warfarin     COPD w/ chronic hypoxic respiratory failure: stable  -continue O2  -PRN inhaled bronchodilators    DNR status, confirmed by review of DDNR     HTN:  -continue lisinopril, diltiazem, metoprolol - controlled   BP Readings from Last 1 Encounters:   12/01/19 157/73         History of alcohol abuse: remote, resolved per son     History of CVA:  -continue aspirin     Normally gets his care in the [facility] system per his son     DVT ppx: anticoagulated  Code status: DNR - son states he has a DDNR at home  Disposition: prior living arrangement with ready          Care Plan discussed with: Patient/Family  Disposition: TBD     Hospital Problems  Never Reviewed          Codes Class Noted POA    Cellulitis of right arm ICD-10-CM: L03.113  ICD-9-CM: 682.3  11/27/2019 Unknown                Review of Systems:   Review of systems not obtained due to patient factors. Vital Signs:    Last 24hrs VS reviewed since prior progress note. Most recent are:  Visit Vitals  /73 (BP 1 Location: Left arm, BP Patient Position: At rest)   Pulse 100   Temp 97.3 °F (36.3 °C)   Resp 16   Ht 5' 10\" (1.778 m)   Wt 81.6 kg (179 lb 14.3 oz)   SpO2 94%   BMI 25.81 kg/m²       No intake or output data in the 24 hours ending 12/01/19 1006     Physical Examination:             Constitutional:  No acute distress,     ENT:  Oral mucous moist, oropharynx benign. Resp:  clear    No wheezing/rhonchi/rales. No accessory muscle use   CV:  Regular rhythm, normal rate, no murmurs, gallops, rubs    GI:  Soft, non distended, non tender.  normoactive bowel sounds, no hepatosplenomegaly     Musculoskeletal:  No edema, warm, 2+ pulses throughout Neurologic:  Moves all extremities. Demented      Psych:  Somnolent , not agitated not anxious   Skin:  Poor turgor, cellulitis rt arm to shoulder ( cellulitis retracting from line of demarcation)       Data Review:    Review and/or order of clinical lab test      Labs:     Recent Labs     11/30/19 0040 11/29/19 0534   WBC 14.1* 12.2*   HGB 15.0 13.0   HCT 49.8 43.6    207     Recent Labs     12/01/19 0209 11/30/19 0040 11/29/19  0534    140 140   K 3.7 4.4 3.8    104 107   CO2 31 35* 31   BUN 17 20 14   CREA 0.81 0.96 0.73   * 126* 93   CA 9.2 9.7 8.1*     No results for input(s): SGOT, GPT, ALT, AP, TBIL, TBILI, TP, ALB, GLOB, GGT, AML, LPSE in the last 72 hours. No lab exists for component: AMYP, HLPSE  Recent Labs     12/01/19 0209 11/30/19 0040 11/29/19 0534   INR 4.2* 2.7* 3.0*   PTP 39.7* 25.7* 28.3*      No results for input(s): FE, TIBC, PSAT, FERR in the last 72 hours. No results found for: FOL, RBCF   No results for input(s): PH, PCO2, PO2 in the last 72 hours. No results for input(s): CPK, CKNDX, TROIQ in the last 72 hours.     No lab exists for component: CPKMB  No results found for: CHOL, CHOLX, CHLST, CHOLV, HDL, HDLP, LDL, LDLC, DLDLP, TGLX, TRIGL, TRIGP, CHHD, CHHDX  No results found for: [facility]  Lab Results   Component Value Date/Time    Color YELLOW/STRAW 11/27/2019 09:08 PM    Appearance CLOUDY (A) 11/27/2019 09:08 PM    Specific gravity 1.020 11/27/2019 09:08 PM    pH (UA) 6.0 11/27/2019 09:08 PM    Protein 100 (A) 11/27/2019 09:08 PM    Glucose NEGATIVE  11/27/2019 09:08 PM    Ketone TRACE (A) 11/27/2019 09:08 PM    Bilirubin NEGATIVE  11/27/2019 09:08 PM    Urobilinogen 1.0 11/27/2019 09:08 PM    Nitrites NEGATIVE  11/27/2019 09:08 PM    Leukocyte Esterase LARGE (A) 11/27/2019 09:08 PM    Epithelial cells FEW 11/27/2019 09:08 PM    Bacteria NEGATIVE  11/27/2019 09:08 PM    WBC >100 (H) 11/27/2019 09:08 PM    RBC >100 (H) 11/27/2019 09:08 PM Medications Reviewed:     Current Facility-Administered Medications   Medication Dose Route Frequency    Warfarin - please HOLD on 12/1 for INR of 4.2. Thanks!    Other ONCE    albuterol-ipratropium (DUO-NEB) 2.5 MG-0.5 MG/3 ML  3 mL Nebulization Q6H PRN    lactobac ac& pc-s.therm-b.anim (HELADIO Q/RISAQUAD)  1 Cap Oral DAILY    arformoterol (BROVANA) neb solution 15 mcg  15 mcg Nebulization BID RT    And    budesonide (PULMICORT) 500 mcg/2 ml nebulizer suspension  500 mcg Nebulization BID RT    vancomycin (VANCOCIN) 1250 mg in  ml infusion  1,250 mg IntraVENous Q16H    traMADol (ULTRAM) tablet 50 mg  50 mg Oral Q6H PRN    fentaNYL citrate (PF) injection 25 mcg  25 mcg IntraVENous Q4H PRN    hydrALAZINE (APRESOLINE) 20 mg/mL injection 20 mg  20 mg IntraVENous Q6H PRN    lisinopril (PRINIVIL, ZESTRIL) tablet 10 mg  10 mg Oral DAILY    amLODIPine (NORVASC) tablet 5 mg  5 mg Oral DAILY    0.9% sodium chloride infusion  125 mL/hr IntraVENous CONTINUOUS    sodium chloride (NS) flush 5-40 mL  5-40 mL IntraVENous Q8H    sodium chloride (NS) flush 5-40 mL  5-40 mL IntraVENous PRN    acetaminophen (TYLENOL) tablet 650 mg  650 mg Oral Q4H PRN    ondansetron (ZOFRAN) injection 4 mg  4 mg IntraVENous Q4H PRN    cefTRIAXone (ROCEPHIN) 1 g in 0.9% sodium chloride (MBP/ADV) 50 mL  1 g IntraVENous Q24H    aspirin delayed-release tablet 81 mg  81 mg Oral DAILY    atorvastatin (LIPITOR) tablet 40 mg  40 mg Oral DAILY    dilTIAZem (CARDIZEM) IR tablet 30 mg  30 mg Oral TIDAC    furosemide (LASIX) tablet 20 mg  20 mg Oral DAILY    metoprolol tartrate (LOPRESSOR) tablet 50 mg  50 mg Oral BID    thiamine HCL (B-1) tablet 100 mg  100 mg Oral DAILY    Warfarin - pharmacy to dose   Other Rx Dosing/Monitoring    Vancomycin - pharmacy to dose   Other Rx Dosing/Monitoring     ______________________________________________________________________  EXPECTED LENGTH OF STAY: - - -  ACTUAL LENGTH OF STAY: [id number] Tati Wheatley, MD

## 2019-12-02 LAB
ANION GAP SERPL CALC-SCNC: 3 MMOL/L (ref 5–15)
BACTERIA SPEC CULT: NORMAL
BUN SERPL-MCNC: 14 MG/DL (ref 6–20)
BUN/CREAT SERPL: 19 (ref 12–20)
CALCIUM SERPL-MCNC: 9.3 MG/DL (ref 8.5–10.1)
CHLORIDE SERPL-SCNC: 107 MMOL/L (ref 97–108)
CO2 SERPL-SCNC: 33 MMOL/L (ref 21–32)
CREAT SERPL-MCNC: 0.75 MG/DL (ref 0.7–1.3)
GLUCOSE SERPL-MCNC: 112 MG/DL (ref 65–100)
INR PPP: 4.4 (ref 0.9–1.1)
POTASSIUM SERPL-SCNC: 3.6 MMOL/L (ref 3.5–5.1)
PROTHROMBIN TIME: 41.3 SEC (ref 9–11.1)
SERVICE CMNT-IMP: NORMAL
SODIUM SERPL-SCNC: 143 MMOL/L (ref 136–145)

## 2019-12-02 PROCEDURE — 77010033678 HC OXYGEN DAILY

## 2019-12-02 PROCEDURE — 92526 ORAL FUNCTION THERAPY: CPT | Performed by: SPEECH-LANGUAGE PATHOLOGIST

## 2019-12-02 PROCEDURE — 74011250636 HC RX REV CODE- 250/636: Performed by: HOSPITALIST

## 2019-12-02 PROCEDURE — 74011000250 HC RX REV CODE- 250: Performed by: INTERNAL MEDICINE

## 2019-12-02 PROCEDURE — 74011250637 HC RX REV CODE- 250/637: Performed by: INTERNAL MEDICINE

## 2019-12-02 PROCEDURE — 74011000258 HC RX REV CODE- 258: Performed by: HOSPITALIST

## 2019-12-02 PROCEDURE — 80048 BASIC METABOLIC PNL TOTAL CA: CPT

## 2019-12-02 PROCEDURE — 94640 AIRWAY INHALATION TREATMENT: CPT

## 2019-12-02 PROCEDURE — 74011250637 HC RX REV CODE- 250/637: Performed by: HOSPITALIST

## 2019-12-02 PROCEDURE — 94664 DEMO&/EVAL PT USE INHALER: CPT

## 2019-12-02 PROCEDURE — 36415 COLL VENOUS BLD VENIPUNCTURE: CPT

## 2019-12-02 PROCEDURE — 65270000029 HC RM PRIVATE

## 2019-12-02 PROCEDURE — 85610 PROTHROMBIN TIME: CPT

## 2019-12-02 PROCEDURE — 94760 N-INVAS EAR/PLS OXIMETRY 1: CPT

## 2019-12-02 RX ADMIN — METOPROLOL TARTRATE 50 MG: 50 TABLET, FILM COATED ORAL at 18:10

## 2019-12-02 RX ADMIN — DILTIAZEM HYDROCHLORIDE 30 MG: 30 TABLET, FILM COATED ORAL at 06:30

## 2019-12-02 RX ADMIN — DILTIAZEM HYDROCHLORIDE 30 MG: 30 TABLET, FILM COATED ORAL at 18:10

## 2019-12-02 RX ADMIN — Medication 1 CAPSULE: at 10:46

## 2019-12-02 RX ADMIN — CEFTRIAXONE 1 G: 1 INJECTION, POWDER, FOR SOLUTION INTRAMUSCULAR; INTRAVENOUS at 18:10

## 2019-12-02 RX ADMIN — LISINOPRIL 10 MG: 10 TABLET ORAL at 10:46

## 2019-12-02 RX ADMIN — ARFORMOTEROL TARTRATE 15 MCG: 15 SOLUTION RESPIRATORY (INHALATION) at 07:10

## 2019-12-02 RX ADMIN — BUDESONIDE 500 MCG: 0.5 INHALANT RESPIRATORY (INHALATION) at 23:30

## 2019-12-02 RX ADMIN — ATORVASTATIN CALCIUM 40 MG: 40 TABLET, FILM COATED ORAL at 10:46

## 2019-12-02 RX ADMIN — BUDESONIDE 500 MCG: 0.5 INHALANT RESPIRATORY (INHALATION) at 07:10

## 2019-12-02 RX ADMIN — VANCOMYCIN HYDROCHLORIDE 1250 MG: 10 INJECTION, POWDER, LYOPHILIZED, FOR SOLUTION INTRAVENOUS at 06:29

## 2019-12-02 RX ADMIN — METOPROLOL TARTRATE 50 MG: 50 TABLET, FILM COATED ORAL at 10:46

## 2019-12-02 RX ADMIN — FUROSEMIDE 20 MG: 20 TABLET ORAL at 10:46

## 2019-12-02 RX ADMIN — ASPIRIN 81 MG: 81 TABLET, COATED ORAL at 10:46

## 2019-12-02 RX ADMIN — ARFORMOTEROL TARTRATE 15 MCG: 15 SOLUTION RESPIRATORY (INHALATION) at 23:30

## 2019-12-02 RX ADMIN — DILTIAZEM HYDROCHLORIDE 30 MG: 30 TABLET, FILM COATED ORAL at 11:52

## 2019-12-02 RX ADMIN — Medication 100 MG: at 10:46

## 2019-12-02 RX ADMIN — AMLODIPINE BESYLATE 5 MG: 5 TABLET ORAL at 10:46

## 2019-12-02 NOTE — PROGRESS NOTES
Patient is acutely agitated and attempting to get out of bed. Became violent when attempting to move patient back into bed. Code atlas called, we were unable to return patient to bed, a continues to be agitated, pulling at lines, refusing to take medications or wear NC. Dr Jason Gong notified, awaiting orders.

## 2019-12-02 NOTE — PROGRESS NOTES
[facility] Adult  Hospitalist Group                                                                                          Hospitalist Progress Note  Mckinley Lambert MD  Answering service: [phone] OR [phone] from in house phone        Date of Service:  2019  NAME:  Joan Urbina  :  1935  MRN:  [id number]      Admission Summary:       CC: altered mental state        HPI: 81 y.o man w/ afib, CVA, HTN, COPD, probable dementia, nursing home resident, who presents with AMS. He was reportedly more confused than baseline during the day. He was seen by [facility] and was diagnosed with a UTI based on a urine dipstick and cellulitis of the right arm. He was sent to the ED due to the increase in confusion. His son describes that he is confused at baseline and the severity is variable, and that he is back at baseline currently. He has not been formally diagnosed with dementia. No known fever or pain. The patient denies any complaints but he is a poor historian.       Interval history / Subjective:       2019 :   Agitated at times. Lt UE cellulitic changes improving daily; pt now moving lt arm freely. Had Haldol once but is needing sitter now for safety; per son, once pt's acute infection/illness resolve, pt becomes increasingly active. Needs PT/OT and dispo back to prior domicile once stable,  Psych issues may make the last couple of days here difficult. Discussed w son 2019 ;  son is hopeful pt to return to prior domicil an assisted living facility. As cellulitic changes and swelling are markedly improved, likely abx can be down graded to  Cleocin ( or Augmentin)  Po and discharge on same w close f/u with a PCP/other or ortho    Note also, son feels that best avoid psychotropics. Assessment & Plan:     UTI: (POA) this is based on an outpatient urine dipstick that showed +++ leukocyte esterase and + nitrite. We have been unable to obtain a urine specimen here so far. -send urine specimen when provided. Already received IV ceftriaxone which will be continued- no changes 11/28 - 12/2/2019  Ostensibly resolved     COPD adding routine nebs. No wheezing 12/2/2019      R arm cellulitis with R elbow effusion:  -s/p arthrocentesis by ortho. Studies sent. D/w ortho keep NPO for now until cell counts return  -will place on IV vancomycin for now - no changes 11/28  - 12/2/2019 as to rx. The cellulitic changes retracting from line of demarcation drawn 11/29  And by 12/2/2019 pt using lt UE freely and no demonstrable pain. Cult pending yet    Acute aspiration for speech eval - seems to have resolved w/o sequella     AMS: likely metabolic encephalopathy on chronic dementia though seems to have resolved per the family. Monitor- moslty somnolent last couple of days. Acute agitation as an exacerbation of dementia 11/28 pm. Haldol x 1 dose given      Chronic atrial fibrillation:  -continue diltiazem, metoprolol and warfarin     COPD w/ chronic hypoxic respiratory failure: stable  -continue O2  -PRN inhaled bronchodilators    DNR status, confirmed by review of DDNR     HTN:  -continue lisinopril, diltiazem, metoprolol - controlled   BP Readings from Last 1 Encounters:   12/02/19 120/64         History of alcohol abuse: remote, resolved per son     History of CVA:  -continue aspirin     Normally gets his care in the [facility] per his son     DVT ppx: anticoagulated  Code status: DNR - son states he has a DDNR at home  Disposition: prior living arrangement with ready          Care Plan discussed with: Patient/Family  Disposition: TBD     Hospital Problems  Never Reviewed          Codes Class Noted POA    Cellulitis of right arm ICD-10-CM: L03.113  ICD-9-CM: 682.3  11/27/2019 Unknown                Review of Systems:   Review of systems not obtained due to patient factors. Vital Signs:    Last 24hrs VS reviewed since prior progress note.  Most recent are:  Visit Vitals  /64 Pulse 92   Temp 97.3 °F (36.3 °C)   Resp 16   Ht 5' 10\" (1.778 m)   Wt 81.6 kg (179 lb 14.3 oz)   SpO2 96%   BMI 25.81 kg/m²         Intake/Output Summary (Last 24 hours) at 12/2/2019 1055  Last data filed at 12/1/2019 1658  Gross per 24 hour   Intake    Output 200 ml   Net -200 ml        Physical Examination:             Constitutional:  No acute distress,     ENT:  Oral mucous moist, oropharynx benign. Resp:  clear    No wheezing/rhonchi/rales. No accessory muscle use   CV:  Regular rhythm, normal rate, no murmurs, gallops, rubs    GI:  Soft, non distended, non tender. normoactive bowel sounds, no hepatosplenomegaly     Musculoskeletal:  No edema, warm, 2+ pulses throughout    Neurologic:  Moves all extremities. Demented      Psych:  Smiles  , not agitated not anxious   Skin:  Good  turgor, cellulitis rt arm to shoulder ( cellulitis retracting from line of demarcation daily)       Data Review:    Review and/or order of clinical lab test      Labs:     Recent Labs     11/30/19 0040   WBC 14.1*   HGB 15.0   HCT 49.8        Recent Labs     12/02/19 0225 12/01/19 0209 11/30/19 0040    142 140   K 3.6 3.7 4.4    107 104   CO2 33* 31 35*   BUN 14 17 20   CREA 0.75 0.81 0.96   * 126* 126*   CA 9.3 9.2 9.7     No results for input(s): SGOT, GPT, ALT, AP, TBIL, TBILI, TP, ALB, GLOB, GGT, AML, LPSE in the last 72 hours. No lab exists for component: AMYP, HLPSE  Recent Labs     12/02/19 0225 12/01/19 0209 11/30/19 0040   INR 4.4* 4.2* 2.7*   PTP 41.3* 39.7* 25.7*      No results for input(s): FE, TIBC, PSAT, FERR in the last 72 hours. No results found for: FOL, RBCF   No results for input(s): PH, PCO2, PO2 in the last 72 hours. No results for input(s): CPK, CKNDX, TROIQ in the last 72 hours.     No lab exists for component: CPKMB  No results found for: CHOL, CHOLX, CHLST, CHOLV, HDL, HDLP, LDL, LDLC, DLDLP, TGLX, TRIGL, TRIGP, CHHD, CHHDX  No results found for: GLUCPOC  Lab Results   Component Value Date/Time    Color YELLOW/STRAW 11/27/2019 09:08 PM    Appearance CLOUDY (A) 11/27/2019 09:08 PM    Specific gravity 1.020 11/27/2019 09:08 PM    pH (UA) 6.0 11/27/2019 09:08 PM    Protein 100 (A) 11/27/2019 09:08 PM    Glucose NEGATIVE  11/27/2019 09:08 PM    Ketone TRACE (A) 11/27/2019 09:08 PM    Bilirubin NEGATIVE  11/27/2019 09:08 PM    Urobilinogen 1.0 11/27/2019 09:08 PM    Nitrites NEGATIVE  11/27/2019 09:08 PM    Leukocyte Esterase LARGE (A) 11/27/2019 09:08 PM    Epithelial cells FEW 11/27/2019 09:08 PM    Bacteria NEGATIVE  11/27/2019 09:08 PM    WBC >100 (H) 11/27/2019 09:08 PM    RBC >100 (H) 11/27/2019 09:08 PM         Medications Reviewed:     Current Facility-Administered Medications   Medication Dose Route Frequency    Warfarin - Hold today   Other ONCE    albuterol-ipratropium (DUO-NEB) 2.5 MG-0.5 MG/3 ML  3 mL Nebulization Q6H PRN    lactobac ac& pc-s.therm-b.anim (HELADIO Q/RISAQUAD)  1 Cap Oral DAILY    arformoterol (BROVANA) neb solution 15 mcg  15 mcg Nebulization BID RT    And    budesonide (PULMICORT) 500 mcg/2 ml nebulizer suspension  500 mcg Nebulization BID RT    vancomycin (VANCOCIN) 1250 mg in  ml infusion  1,250 mg IntraVENous Q16H    traMADol (ULTRAM) tablet 50 mg  50 mg Oral Q6H PRN    fentaNYL citrate (PF) injection 25 mcg  25 mcg IntraVENous Q4H PRN    hydrALAZINE (APRESOLINE) 20 mg/mL injection 20 mg  20 mg IntraVENous Q6H PRN    lisinopril (PRINIVIL, ZESTRIL) tablet 10 mg  10 mg Oral DAILY    amLODIPine (NORVASC) tablet 5 mg  5 mg Oral DAILY    0.9% sodium chloride infusion  125 mL/hr IntraVENous CONTINUOUS    sodium chloride (NS) flush 5-40 mL  5-40 mL IntraVENous Q8H    sodium chloride (NS) flush 5-40 mL  5-40 mL IntraVENous PRN    acetaminophen (TYLENOL) tablet 650 mg  650 mg Oral Q4H PRN    ondansetron (ZOFRAN) injection 4 mg  4 mg IntraVENous Q4H PRN    cefTRIAXone (ROCEPHIN) 1 g in 0.9% sodium chloride (MBP/ADV) 50 mL  1 g IntraVENous Q24H    aspirin delayed-release tablet 81 mg  81 mg Oral DAILY    atorvastatin (LIPITOR) tablet 40 mg  40 mg Oral DAILY    dilTIAZem (CARDIZEM) IR tablet 30 mg  30 mg Oral TIDAC    furosemide (LASIX) tablet 20 mg  20 mg Oral DAILY    metoprolol tartrate (LOPRESSOR) tablet 50 mg  50 mg Oral BID    thiamine HCL (B-1) tablet 100 mg  100 mg Oral DAILY    Warfarin - pharmacy to dose   Other Rx Dosing/Monitoring    Vancomycin - pharmacy to dose   Other Rx Dosing/Monitoring     ______________________________________________________________________  EXPECTED LENGTH OF STAY: - - -  ACTUAL LENGTH OF STAY:          Kalen Kilgore MD

## 2019-12-02 NOTE — PROGRESS NOTES
ORTHO PROGRESS NOTE    2019  Admit Date:   2019    Post Op day: * No surgery found *    Subjective:    Brooklynn Sanchez is seen recently after having \"Code [place]\" called by Nursing. Sitting at side of bed mumbling and hard to understand. Does not respond coherently to questions. Nursing reports is not compliant with arm elevation. No growth from elbow aspirate to date and no blood culture results available yet. Remains on  Vanc and rocephin per chart.     PT/OT:   Gait:                    Vital Signs:    Patient Vitals for the past 8 hrs:   Temp SpO2   19 0710  96 %     Temp (24hrs), Av.7 °F (36.5 °C), Min:97.3 °F (36.3 °C), Max:98 °F (36.7 °C)      Pain Control:   Pain Assessment  Pain Scale 1: Numeric (0 - 10)  Pain Intensity 1: 0  Pain Location 1: Arm  Pain Orientation 1: Right  Pain Description 1: Aching, Burning  Pain Intervention(s) 1: Ice, Repositioned, Rest    Meds:    Current Facility-Administered Medications   Medication Dose Route Frequency    albuterol-ipratropium (DUO-NEB) 2.5 MG-0.5 MG/3 ML  3 mL Nebulization Q6H PRN    lactobac ac& pc-s.therm-b.anim (HELADIO Q/RISAQUAD)  1 Cap Oral DAILY    arformoterol (BROVANA) neb solution 15 mcg  15 mcg Nebulization BID RT    And    budesonide (PULMICORT) 500 mcg/2 ml nebulizer suspension  500 mcg Nebulization BID RT    vancomycin (VANCOCIN) 1250 mg in  ml infusion  1,250 mg IntraVENous Q16H    traMADol (ULTRAM) tablet 50 mg  50 mg Oral Q6H PRN    fentaNYL citrate (PF) injection 25 mcg  25 mcg IntraVENous Q4H PRN    hydrALAZINE (APRESOLINE) 20 mg/mL injection 20 mg  20 mg IntraVENous Q6H PRN    lisinopril (PRINIVIL, ZESTRIL) tablet 10 mg  10 mg Oral DAILY    amLODIPine (NORVASC) tablet 5 mg  5 mg Oral DAILY    0.9% sodium chloride infusion  125 mL/hr IntraVENous CONTINUOUS    sodium chloride (NS) flush 5-40 mL  5-40 mL IntraVENous Q8H    sodium chloride (NS) flush 5-40 mL  5-40 mL IntraVENous PRN    acetaminophen (TYLENOL) tablet 650 mg  650 mg Oral Q4H PRN    ondansetron (ZOFRAN) injection 4 mg  4 mg IntraVENous Q4H PRN    cefTRIAXone (ROCEPHIN) 1 g in 0.9% sodium chloride (MBP/ADV) 50 mL  1 g IntraVENous Q24H    aspirin delayed-release tablet 81 mg  81 mg Oral DAILY    atorvastatin (LIPITOR) tablet 40 mg  40 mg Oral DAILY    dilTIAZem (CARDIZEM) IR tablet 30 mg  30 mg Oral TIDAC    furosemide (LASIX) tablet 20 mg  20 mg Oral DAILY    metoprolol tartrate (LOPRESSOR) tablet 50 mg  50 mg Oral BID    thiamine HCL (B-1) tablet 100 mg  100 mg Oral DAILY    Warfarin - pharmacy to dose   Other Rx Dosing/Monitoring    Vancomycin - pharmacy to dose   Other Rx Dosing/Monitoring       LAB:    Recent Labs     12/02/19  0225  11/30/19  0040   HCT  --   --  49.8   HGB  --   --  15.0   INR 4.4*   < > 2.7*    < > = values in this interval not displayed.        Transfuse PRBC's:      Assessment & Physician's Comment:  Right arm with erythema noted from just proximal to elbow down through hand which has traveled outside demarcations  Does not allow further exam of elbow but is seen using the arm for support at side  Neurovascular checks within normal limits  Orientation:  Disoriented (new onset)    Active Problems:    Cellulitis of right arm (11/27/2019)        Plan:    Ongoing cellulitis of right arm - erythema worsened but appears to be in a dependent fashion as left arm shows similar redness distribution  No growth from cultures so far and as such no indication for surgical intervention  Encourage elevation  Cont Abx per primary team  Medical management per primary team     Harris Danielle PA-C   Orthopedic Trauma Service  [address] [facility]

## 2019-12-02 NOTE — PROGRESS NOTES
Patient identified for rounding by Anamaria Li the charge nurse on 5 W and Jeniffer the CCL. Patient had Code [place] called this morning due to combative behavior. Patient is alert and disoriented. His mood is tense and affect labile. Patient wants to get up but is unsteady on his feet and asking for his clothing. He is uncooperative with care, trying to pull out his IV and taking his oxygen off. Nurse, Dragan Lopez, is awaiting orders for PRN medication. Recommended sitter since patient is unsteady and uncooperative. Also called his son, Tulio Zhao, who indicated he is from [facility] and is normally able to ambulate safely with a walker. Patient also reportedly calm and cooperative at facility but son thinks he is agitated due to being in the hospital. He agreed to come and visit with patient this morning.

## 2019-12-02 NOTE — PROGRESS NOTES
Problem: Dysphagia (Adult)  Goal: *Acute Goals and Plan of Care (Insert Text)  Description  Speech Therapy Goals  Initiated 11/29/2019  1. Patient will tolerate medication crushed in puree without overt s/s aspiration within 7 days. MET  2. Patient will participate in re-evaluation of swallow function within 7 days. MET  Initiated 12/2/2019  1. Patient will tolerate regular diet, thin liquids with careful feeding free of s/s of aspiration within 7 days   Outcome: Progressing Towards Goal   SPEECH LANGUAGE PATHOLOGY DYSPHAGIA TREATMENT  Patient: Nelson Brooks (11 y.o. male)  Date: 12/2/2019  Diagnosis: Cellulitis of right arm [L03.113]   <principal problem not specified>       Precautions:   Skin, Fall    ASSESSMENT:  Patient seen finishing breakfast tray, fed by son and SLP. He did not have dentures in and refused to have them placed. His son reports he typically wears his dentures at all times. Patient required cues to bite solid and prolonged mastication. Oral clearance functional. No s/s of aspiration with any consistency, though suspected delayed pharyngeal swallow noted. Per RN, patient Trudell Lennox" eggs three times yesterday. Additionally, mentation is waxing/waning. He is at increased aspiration risk given dependence for feeding, altered mentation, and lack of dentition which increases time needed for oral manipulation. Discussed possibility of soft diet with patient's son who was not interested in diet downgrade at this time. PLAN:  Recommendations and Planned Interventions:  Continue regular diet, thins  Careful feeding single bites and sips  Only feed when awake and alert, calm, and upright  Otherwise, hold trays  Patient continues to benefit from skilled intervention to address the above impairments. Continue treatment per established plan of care. Discharge Recommendations: To Be Determined     SUBJECTIVE:   Patient stated Oh hi!! I'm James.     OBJECTIVE:   Cognitive and Communication Status:  Neurologic State: Alert, Confused  Orientation Level: Oriented to person  Cognition: Decreased command following  Perception: Appears intact  Perseveration: No perseveration noted  Safety/Judgement: Decreased awareness of environment  Dysphagia Treatment:  Oral Assessment:  Oral Assessment  Dentition: Edentulous  Oral Hygiene: moist, clean  Mandible: No impairment  P.O. Trials:  Patient Position: upright in bed  Vocal quality prior to P.O.: No impairment  Consistency Presented: Thin liquid;Mechanical soft  How Presented: SLP-fed/presented;Straw     Bolus Acceptance: Impaired(cues to bite solid)  Bolus Formation/Control: Impaired  Type of Impairment: Mastication(prolonged)  Propulsion: Delayed (# of seconds)  Oral Residue: None  Initiation of Swallow: Delayed (# of seconds)  Laryngeal Elevation: Functional  Aspiration Signs/Symptoms: None                Oral Phase Severity: Mild  Pharyngeal Phase Severity : Mild                                                                                                                                          After treatment:   []              Patient left in no apparent distress sitting up in chair  [x]              Patient left in no apparent distress in bed  [x]              Call bell left within reach  [x]              Nursing notified  []              Caregiver present  []              Bed alarm activated    COMMUNICATION/EDUCATION:   Patient was educated regarding role of SLP. Diet options while in the hospital    The patients plan of care including recommendations, planned interventions, and recommended diet changes were discussed with: Registered Nurse. []              Posted safety precautions in patient's room.     MURPHY Agustin  Time Calculation: 15 mins

## 2019-12-02 NOTE — ADT AUTH CERT NOTES
Cellulitis - Care Day 3 (11/29/2019) by Krista Montilla RN  
 
   
Review Status Review Entered Completed 11/29/2019 16:23  
   
Criteria Review Care Day: 3 Care Date: 11/29/2019 Level of Care:   
Guideline Day 2 Clinical Status ( ) * Dehydration absent   
( ) * Mental status at baseline ( ) * Hemodynamic stability 11/29/2019 16:23:47 EST by Krista Montilla   
  98  100  18  93%  127/61  3 L NC  
 
84%  PLACED ON NRB   
( ) * Fever absent or improved Routes ( ) * Oral hydration, medications Medications (X) IV antibiotics 11/29/2019 16:23:47 EST by Krista Montilla   
  VANC 1250 MG IV EVERY 18 HOURS , ROCEPHIN 1 G IV EVERY 24  HOURS   
11/29/2019 16:23:47 EST by Krista Montilla Subject: Additional Clinical Information ORTHO:   
  
PLAN:   
I examined patient with Dr. Jasmine of hospitalist team.   Dr. Jasmine thinks erythema less extensive than yesterday (I didn't see patient yesterday). Cont IV abx. I had nursing remove IV acces in right elbow AC since functioning access LUE. Will follow. 11/29/2019 16:23:47 EST by Krista Montilla Subject: Additional Clinical Information WBC 12.2, .3, MCHC 29.8, NEUTS 83, INR 3.0, PT 28.3, ANION GAP 2, CA 8.1   
  
11/29/2019 16:23:47 EST by Krista Montilla Subject: Additional Clinical Information  ML HR CONT IV , DUONEB EVERY 4 HRS PRN X 1,   
  
11/29/2019 16:23:47 EST by Krista Montilla Subject: Additional Clinical Information IM:   
  
11/29/2019 : pt aspirated, non re breather mask, nebs, general support Not by end of rounds most of acute aspiration symptoms abated, pt doing well on RA   
UTI: (POA) this is based on an outpatient urine dipstick that showed +++ leukocyte esterase and + nitrite. We have been unable to obtain a urine specimen here so far. -send urine specimen when provided.  Already received IV ceftriaxone which will be continued- no changes 11/28   
     
 R arm cellulitis with R elbow effusion:   
-s/p arthrocentesis by ortho. Studies sent. D/w ortho keep NPO for now until cell counts return   
-will place on IV vancomycin for now - no changes 11/28      
     
Acute aspiration for speech eval   
     
AMS: likely metabolic encephalopathy on chronic dementia though seems to have resolved per the family. Monitor   
     
Acute agitation as an exacerbation of dementia 11/28 pm. Haldol x 1 dose given   
     
Chronic atrial fibrillation:   
-continue diltiazem, metoprolol and warfarin   
     
COPD w/ chronic hypoxic respiratory failure: stable   
-continue O2   
-PRN inhaled bronchodilators   
     
DNR status, confirmed by review of DDNR   
     
HTN:   
-continue lisinopril, diltiazem, metoprolol - controlled   
     
History of alcohol abuse: remote, resolved per son   
     
History of CVA:   
-continue aspirin   
     
Normally gets his care in the [facility] per his son   
     
DVT ppx:  anticoagulated Code status: [place] [facility] - son states he has a DDNR at home Disposition:  prior living arrangement with [place] [id number] discussed with: Patient/Family Disposition: TBD   
  
  
11/29/2019 16:23:47 EST by Christine Alcantar Subject: Additional Clinical Information WBC 12.2, CA 8.1, ALB 3.3, INR 3.0,   
  
* Milestone  
   
Cellulitis - Care Day 2 (11/28/2019) by Krista Montilla RN  
 
   
Review Status Review Entered Completed 11/29/2019 16:20  
   
Criteria Review Care Day: 2 Care Date: 11/28/2019 Level of Care:   
Guideline Day 2 Clinical Status ( ) * Dehydration absent   
( ) * Mental status at baseline ( ) * Hemodynamic stability 11/29/2019 16:20:03 EST by Krista Montilla   
  98.2  101  25  95%  173/103   
( ) * Fever absent or improved Routes ( ) * Oral hydration, medications Medications (X) IV antibiotics 11/29/2019 16:20:03 EST by Krista Montilla   
  ROCPEHIN 1 G IV EVERY 24 HOURS   
 11/29/2019 16:20:03 EST by Krista Montilla Subject: Additional Clinical Information IM:   
  
UTI: (POA) this is based on an outpatient urine dipstick that showed +++ leukocyte esterase and + nitrite. We have been unable to obtain a urine specimen here so far. -send urine specimen when provided. Already received IV ceftriaxone which will be continued- no changes 11/28   
     
R arm cellulitis with R elbow effusion:   
-s/p arthrocentesis by ortho. Studies sent. D/w ortho keep NPO for now until cell counts return   
-will place on IV vancomycin for now - no changes 11/28      
     
AMS: likely metabolic encephalopathy on chronic dementia though seems to have resolved per the family. Monitor   
     
Chronic atrial fibrillation:   
-continue diltiazem, metoprolol and warfarin   
     
COPD w/ chronic hypoxic respiratory failure: stable   
-continue O2   
-PRN inhaled bronchodilators   
     
DNR status, confirmed by review of DDNR   
     
HTN:   
-continue lisinopril, diltiazem, metoprolol - controlled   
     
History of alcohol abuse: remote, resolved per son   
     
History of CVA:   
-continue aspirin   
     
Normally gets his care in the [facility] per his son   
     
DVT ppx:  anticoagulated Code status: [facility] - son states he has a DDNR at home Disposition:  prior living arrangement with ready   
[facility]  discussed with: Patient/Family Disposition: TBD   
  
  
11/29/2019 16:20:03 EST by Krista Montilla Subject: Additional Clinical Information ORTHO:   
  
Initial aspirate results: Cell count < 20,000 & no organisms seen on gram stain. No urgent need for I&D Monitor culture results and labs Continue elevation and ice Pain control   
     
  
  
11/29/2019 16:20:03 EST by Krista Montilla Subject: Additional Clinical Information INR 2.6, PT 25, CO2 34   
  
11/29/2019 16:20:03 EST by Krista Montilla Subject: Additional Clinical Information  ML HR CONT IV , ROCPEHIN 1 G IV EVERY 24 HOURS , FENTANYL 25 MCG IV X 1, VANC 1250 MG IV EVERY 18 HOURS , HALDOL 5 MG IV ONCE,   
  
* Milestone

## 2019-12-02 NOTE — PROGRESS NOTES
Physical Therapy  Chart reviewed and spoke with RN, [place] called this morning due to pt agitation and is currently not appropriate for PT intervention, will hold at this time, will continue to follow  [address] PT

## 2019-12-02 NOTE — PROGRESS NOTES
Pharmacist Note - Warfarin Dosing  Consult provided for this 84 y.o.male to manage warfarin for Atrial Fibrillation    INR Goal: 2 - 3    Home regimen/ tablet size: 4 mg daily    Drugs that may increase INR: None  Drugs that may decrease INR: None  Other current anticoagulants/ drugs that may increase bleeding risk: Aspirin  Risk factors: Age > 65  Daily INR ordered: YES    Recent Labs     12/02/19  0225 12/01/19  0209 11/30/19  0040   HGB  --   --  15.0   INR 4.4* 4.2* 2.7*     Date               INR                  Dose  11/27  2.7  4 mg (took PTA)   11/28  2.6  4 mg   11/29  3  2.5 mg   11/30               2.7                  3 mg  12/1                 4.2                  HOLD  12/2  4.4  hold                                                                                Assessment/ Plan: Will HOLD the warfarin today for an INR of 4.4. Pharmacy will continue to monitor daily and adjust therapy as indicated. Please contact the pharmacist at  for outpatient recommendations if needed.

## 2019-12-03 LAB
ANION GAP SERPL CALC-SCNC: 5 MMOL/L (ref 5–15)
BASOPHILS # BLD: 0 K/UL (ref 0–0.1)
BASOPHILS NFR BLD: 0 % (ref 0–1)
BUN SERPL-MCNC: 13 MG/DL (ref 6–20)
BUN/CREAT SERPL: 16 (ref 12–20)
CALCIUM SERPL-MCNC: 9.2 MG/DL (ref 8.5–10.1)
CHLORIDE SERPL-SCNC: 108 MMOL/L (ref 97–108)
CO2 SERPL-SCNC: 31 MMOL/L (ref 21–32)
CREAT SERPL-MCNC: 0.83 MG/DL (ref 0.7–1.3)
DIFFERENTIAL METHOD BLD: ABNORMAL
EOSINOPHIL # BLD: 0.1 K/UL (ref 0–0.4)
EOSINOPHIL NFR BLD: 1 % (ref 0–7)
ERYTHROCYTE [DISTWIDTH] IN BLOOD BY AUTOMATED COUNT: 13 % (ref 11.5–14.5)
GLUCOSE SERPL-MCNC: 95 MG/DL (ref 65–100)
HCT VFR BLD AUTO: 46.8 % (ref 36.6–50.3)
HGB BLD-MCNC: 14.2 G/DL (ref 12.1–17)
IMM GRANULOCYTES # BLD AUTO: 0.1 K/UL (ref 0–0.04)
IMM GRANULOCYTES NFR BLD AUTO: 1 % (ref 0–0.5)
INR PPP: 4.3 (ref 0.9–1.1)
LYMPHOCYTES # BLD: 1 K/UL (ref 0.8–3.5)
LYMPHOCYTES NFR BLD: 9 % (ref 12–49)
MCH RBC QN AUTO: 30.7 PG (ref 26–34)
MCHC RBC AUTO-ENTMCNC: 30.3 G/DL (ref 30–36.5)
MCV RBC AUTO: 101.1 FL (ref 80–99)
MONOCYTES # BLD: 0.8 K/UL (ref 0–1)
MONOCYTES NFR BLD: 7 % (ref 5–13)
NEUTS SEG # BLD: 9 K/UL (ref 1.8–8)
NEUTS SEG NFR BLD: 82 % (ref 32–75)
NRBC # BLD: 0 K/UL (ref 0–0.01)
NRBC BLD-RTO: 0 PER 100 WBC
PLATELET # BLD AUTO: 251 K/UL (ref 150–400)
PMV BLD AUTO: 10.1 FL (ref 8.9–12.9)
POTASSIUM SERPL-SCNC: 3.5 MMOL/L (ref 3.5–5.1)
PROTHROMBIN TIME: 40.3 SEC (ref 9–11.1)
RBC # BLD AUTO: 4.63 M/UL (ref 4.1–5.7)
SODIUM SERPL-SCNC: 144 MMOL/L (ref 136–145)
WBC # BLD AUTO: 11 K/UL (ref 4.1–11.1)

## 2019-12-03 PROCEDURE — 85025 COMPLETE CBC W/AUTO DIFF WBC: CPT

## 2019-12-03 PROCEDURE — 74011250637 HC RX REV CODE- 250/637: Performed by: HOSPITALIST

## 2019-12-03 PROCEDURE — 97161 PT EVAL LOW COMPLEX 20 MIN: CPT

## 2019-12-03 PROCEDURE — 36415 COLL VENOUS BLD VENIPUNCTURE: CPT

## 2019-12-03 PROCEDURE — 65270000029 HC RM PRIVATE

## 2019-12-03 PROCEDURE — 74011000258 HC RX REV CODE- 258: Performed by: HOSPITALIST

## 2019-12-03 PROCEDURE — 74011250637 HC RX REV CODE- 250/637: Performed by: INTERNAL MEDICINE

## 2019-12-03 PROCEDURE — 85610 PROTHROMBIN TIME: CPT

## 2019-12-03 PROCEDURE — 97530 THERAPEUTIC ACTIVITIES: CPT

## 2019-12-03 PROCEDURE — 74011250636 HC RX REV CODE- 250/636: Performed by: HOSPITALIST

## 2019-12-03 PROCEDURE — 74011250636 HC RX REV CODE- 250/636: Performed by: INTERNAL MEDICINE

## 2019-12-03 PROCEDURE — 80048 BASIC METABOLIC PNL TOTAL CA: CPT

## 2019-12-03 PROCEDURE — 74011000250 HC RX REV CODE- 250: Performed by: INTERNAL MEDICINE

## 2019-12-03 PROCEDURE — 94640 AIRWAY INHALATION TREATMENT: CPT

## 2019-12-03 RX ADMIN — Medication 100 MG: at 10:03

## 2019-12-03 RX ADMIN — DILTIAZEM HYDROCHLORIDE 30 MG: 30 TABLET, FILM COATED ORAL at 07:30

## 2019-12-03 RX ADMIN — DILTIAZEM HYDROCHLORIDE 30 MG: 30 TABLET, FILM COATED ORAL at 13:25

## 2019-12-03 RX ADMIN — BUDESONIDE 500 MCG: 0.5 INHALANT RESPIRATORY (INHALATION) at 20:07

## 2019-12-03 RX ADMIN — FUROSEMIDE 20 MG: 20 TABLET ORAL at 10:07

## 2019-12-03 RX ADMIN — Medication 10 ML: at 08:12

## 2019-12-03 RX ADMIN — LISINOPRIL 10 MG: 10 TABLET ORAL at 09:00

## 2019-12-03 RX ADMIN — ARFORMOTEROL TARTRATE 15 MCG: 15 SOLUTION RESPIRATORY (INHALATION) at 20:07

## 2019-12-03 RX ADMIN — BUDESONIDE 500 MCG: 0.5 INHALANT RESPIRATORY (INHALATION) at 08:24

## 2019-12-03 RX ADMIN — TRAMADOL HYDROCHLORIDE 50 MG: 50 TABLET, FILM COATED ORAL at 14:41

## 2019-12-03 RX ADMIN — Medication 1 CAPSULE: at 10:08

## 2019-12-03 RX ADMIN — ATORVASTATIN CALCIUM 40 MG: 40 TABLET, FILM COATED ORAL at 10:07

## 2019-12-03 RX ADMIN — AMLODIPINE BESYLATE 5 MG: 5 TABLET ORAL at 09:00

## 2019-12-03 RX ADMIN — ARFORMOTEROL TARTRATE 15 MCG: 15 SOLUTION RESPIRATORY (INHALATION) at 08:24

## 2019-12-03 RX ADMIN — ASPIRIN 81 MG: 81 TABLET, COATED ORAL at 10:07

## 2019-12-03 RX ADMIN — SODIUM CHLORIDE 125 ML/HR: 900 INJECTION, SOLUTION INTRAVENOUS at 08:15

## 2019-12-03 RX ADMIN — DILTIAZEM HYDROCHLORIDE 30 MG: 30 TABLET, FILM COATED ORAL at 17:44

## 2019-12-03 RX ADMIN — CEFTRIAXONE 1 G: 1 INJECTION, POWDER, FOR SOLUTION INTRAMUSCULAR; INTRAVENOUS at 19:38

## 2019-12-03 RX ADMIN — Medication 10 ML: at 00:44

## 2019-12-03 RX ADMIN — VANCOMYCIN HYDROCHLORIDE 1250 MG: 10 INJECTION, POWDER, LYOPHILIZED, FOR SOLUTION INTRAVENOUS at 00:43

## 2019-12-03 RX ADMIN — METOPROLOL TARTRATE 50 MG: 50 TABLET, FILM COATED ORAL at 10:03

## 2019-12-03 RX ADMIN — METOPROLOL TARTRATE 50 MG: 50 TABLET, FILM COATED ORAL at 17:44

## 2019-12-03 NOTE — PROGRESS NOTES
BELLA:      CM left vm for son, Kristina Reusing [phone] to discuss discharge placement.     Savannah Mckeon, [facility]

## 2019-12-03 NOTE — PROGRESS NOTES
ORTHO PROGRESS NOTE    December 3, 2019  Admit Date:   2019    Post Op day: * No surgery found *    Subjective:    Stefan Garcia is resting comfortably at this visit and per nursing has been calmer today. Right arm with reported increased swelling into hand. Remains on Rocephin and Vanc although Medicine service says they stopped the Vanc.     PT/OT:   Gait:                    Vital Signs:    Patient Vitals for the past 8 hrs:   BP Temp Pulse Resp SpO2   19 1421 (P) 131/82 (P) 97.8 °F (36.6 °C) (P) 88 (P) 17 (P) 92 %   19 1403 166/61  (!) 118     19 1325 135/82  95     19 1003 131/82 97 °F (36.1 °C) 91 18 98 %   19 0900 131/82  91       Temp (24hrs), Av.8 °F (36.6 °C), Min:97 °F (36.1 °C), Max:98.5 °F (36.9 °C)      Pain Control:   Pain Assessment  Pain Scale 1: Numeric (0 - 10)  Pain Intensity 1: 0  Pain Location 1: Arm  Pain Orientation 1: Right  Pain Description 1: Aching, Burning  Pain Intervention(s) 1: Ice, Repositioned, Rest    Meds:    Current Facility-Administered Medications   Medication Dose Route Frequency    albuterol-ipratropium (DUO-NEB) 2.5 MG-0.5 MG/3 ML  3 mL Nebulization Q6H PRN    lactobac ac& pc-s.therm-b.anim (HELADIO Q/RISAQUAD)  1 Cap Oral DAILY    arformoterol (BROVANA) neb solution 15 mcg  15 mcg Nebulization BID RT    And    budesonide (PULMICORT) 500 mcg/2 ml nebulizer suspension  500 mcg Nebulization BID RT    traMADol (ULTRAM) tablet 50 mg  50 mg Oral Q6H PRN    fentaNYL citrate (PF) injection 25 mcg  25 mcg IntraVENous Q4H PRN    hydrALAZINE (APRESOLINE) 20 mg/mL injection 20 mg  20 mg IntraVENous Q6H PRN    lisinopril (PRINIVIL, ZESTRIL) tablet 10 mg  10 mg Oral DAILY    amLODIPine (NORVASC) tablet 5 mg  5 mg Oral DAILY    sodium chloride (NS) flush 5-40 mL  5-40 mL IntraVENous Q8H    sodium chloride (NS) flush 5-40 mL  5-40 mL IntraVENous PRN    acetaminophen (TYLENOL) tablet 650 mg  650 mg Oral Q4H PRN    ondansetron (ZOFRAN) injection 4 mg  4 mg IntraVENous Q4H PRN    cefTRIAXone (ROCEPHIN) 1 g in 0.9% sodium chloride (MBP/ADV) 50 mL  1 g IntraVENous Q24H    aspirin delayed-release tablet 81 mg  81 mg Oral DAILY    atorvastatin (LIPITOR) tablet 40 mg  40 mg Oral DAILY    dilTIAZem (CARDIZEM) IR tablet 30 mg  30 mg Oral TIDAC    furosemide (LASIX) tablet 20 mg  20 mg Oral DAILY    metoprolol tartrate (LOPRESSOR) tablet 50 mg  50 mg Oral BID    thiamine HCL (B-1) tablet 100 mg  100 mg Oral DAILY       LAB:    Recent Labs     12/03/19  0401   HCT 46.8   HGB 14.2   INR 4.3*       Transfuse PRBC's:      Assessment & Physician's Comment:  Asleep and NAD  Right arm with erythema extending from just proximal to the elbow down to the wrist/hand junction  Right hand grossly swollen over dorsum (worse than seen yesterday)  Feels like soft tissue swelling only at this time  Right elbow without fluctuance  Does not respond to sensory or motor function requests    Active Problems:    Cellulitis of right arm (11/27/2019)        Plan:    So far no growth from elbow aspirate but redness continues despite Abx  Patient has been almost completely non-compliant with efforts to keep arm elevated and has had hanging dependent often  If concern remains regarding a nidus for infection further image may be warrented to include an MRI encompassing the entire right arm from the shoulder through the hand; this would likely be hard to obtain considering the patient's non-compliance with previous intervention efforts  MRI with sedation is also a possibility but unclear if patient would agree to that either  No indication for surgical intervention at this time  Medical management per primary team  Following    Dr Karlos Wells aware of patient condition and in agreement with current plan    Jerry Kelly PA-C   Orthopedic Trauma Service  [facility]

## 2019-12-03 NOTE — PROGRESS NOTES
Problem: Mobility Impaired (Adult and Pediatric)  Goal: *Acute Goals and Plan of Care (Insert Text)  Description  FUNCTIONAL STATUS PRIOR TO ADMISSION: Pt was unable to provide prior level of function due to dementia. HOME SUPPORT PRIOR TO ADMISSION: Pt lives in assisted living facility. Physical Therapy Goals  Initiated 12/3/2019  1. Patient will move from supine to sit and sit to supine  in bed with supervision within 7 day(s). 2.  Patient will transfer from bed to chair and chair to bed with minimal assistance/contact guard assist using the least restrictive device within 7 day(s). 3.  Patient will perform sit to stand with minimal assistance/contact guard assist within 7 day(s). 4.  Patient will ambulate with minimal assistance/contact guard assist for 75 feet with the least restrictive device within 7 day(s). Outcome: Progressing Towards Goal   PHYSICAL THERAPY EVALUATION  Patient: Brian Palmer (11 y.o. male)  Date: 12/3/2019  Primary Diagnosis: Cellulitis of right arm [L03.113]        Precautions:   Fall, Skin      ASSESSMENT  Based on the objective data described below, the patient presents with confusion, calm and cooperative, decreased tolerance for activity with +ROMAN noted upon sitting, SPO2 88% following transfer OOB, decreased generalized strength, cellulitis RUE with significant edema and erythema, decreased sitting and standing balance, and impaired functional mobility. Pt is unable to provide background information and prior level of function due to dementia. RN reports pt has been very drowsy today. He was sleeping but aroused easily and agreeable to participate. Pt was able to participate with bed mobility, sit to stand transfers, and side stepping to bedside chair with rolling walker and assist x 2. He was reclined in recliner with RUE elevated on pillow. RN aware.      Current Level of Function Impacting Discharge (mobility/balance): bed mobility with moderate assist x 1, sit to stand with minimal assist x 1, bed to chair with rolling walker with minimal assist x 2    Functional Outcome Measure: The patient scored 4/28 on the Tinetti outcome measure which is indicative of low fall risk. Other factors to consider for discharge: dementia, may be less confused in a familiar environment, fall risk, pt has periods of agitation     Patient will benefit from skilled therapy intervention to address the above noted impairments. PLAN :  Recommendations and Planned Interventions: bed mobility training, transfer training, gait training, and patient and family training/education      Frequency/Duration: Patient will be followed by physical therapy:  5 times a week to address goals. Recommendation for discharge: (in order for the patient to meet his/her long term goals)  To be determined: Per  pt's son only wants pt to return to [facility]     This discharge recommendation:  Has been made in collaboration with the attending provider and/or case management    IF patient discharges home will need the following DME: to be determined (TBD)         SUBJECTIVE:   Patient stated I don't know.     OBJECTIVE DATA SUMMARY:   HISTORY:    Past Medical History:   Diagnosis Date    Atrial fibrillation ([facility]     Chronic [facility] disease ([facility]     Hypertension    History reviewed. No pertinent surgical history.     Personal factors and/or comorbidities impacting plan of care: fall risk, dementia    Home Situation  Home Environment: [address] Name: [place] 56: Family member(s)  Patient Expects to be Discharged to[de-identified] Unknown    EXAMINATION/PRESENTATION/DECISION MAKING:   Critical Behavior:  Neurologic State: Confused  Orientation Level: Oriented to person, Disoriented to place, Disoriented to situation, Disoriented to time  Cognition: Follows commands, Memory loss, Impaired decision making, Poor safety awareness  Safety/Judgement: Decreased awareness of need for safety, Decreased insight into deficits, Decreased awareness of need for assistance, Decreased awareness of environment  Hearing: Auditory  Auditory Impairment: Hard of hearing, bilateral(unknown if hearing aids)  Skin:  erythema RUE  Edema: pitting edema RUE  Range Of Motion:  AROM: Generally decreased, functional                       Strength:    Strength: Generally decreased, functional                    Tone & Sensation:   Tone: Normal              Sensation: (unable to assess )               Coordination:   Generally decreased, functional        Functional Mobility:  Bed Mobility:     Supine to Sit: Assist x1; Moderate assistance        Transfers:  Sit to Stand: Minimum assistance;Assist x1;Additional time, leans posteriorly with thighs resting against bed, stands with flexed posture and on heels initially due to posterior lean  Stand to Sit: Minimum assistance;Assist x1        Bed to Chair: Minimum assistance;Assist x2; Additional time              Balance:   Sitting: Impaired  Sitting - Static: Good (unsupported)  Sitting - Dynamic: Fair (occasional)  Standing: Impaired  Standing - Static: Constant support; Fair  Standing - Dynamic : Constant support;Poor                                              Functional Measure:  Tinetti test:    Sitting Balance: 1  Arises: 0  Attempts to Rise: 0  Immediate Standing Balance: 1  Standing Balance: 1  Nudged: 0  Eyes Closed: 0  Turn 360 Degrees - Continuous/Discontinuous: 0  Turn 360 Degrees - Steady/Unsteady: 0  Sitting Down: 1  Balance Score: 4 Balance total score  Indication of Gait: 0  R Step Length/Height: 0  L Step Length/Height: 0  R Foot Clearance: 0  L Foot Clearance: 0  Step Symmetry: 0  Step Continuity: 0  Path: 0  Trunk: 0  Walking Time: 0  Gait Score: 0 Gait total score  Total Score: 4/28 Overall total score         Tinetti Tool Score Risk of Falls  <19 = High Fall Risk  19-24 = Moderate Fall Risk  25-28 = Low Fall Risk  Nikitaetti ME. Performance-Oriented Assessment of Mobility Problems in Elderly Patients. [facility]; [id number]. (Scoring Description: PT Bulletin Feb. 10, 1993)    Older adults: Mick Eaton et al, 2009; n = 1000 [facility] elderly evaluated with ABC, STEPHANY, ADL, and IADL)  · Mean STEPHANY score for males aged 69-68 years = 26.21(3.40)  · Mean STEPHANY score for females age 69-68 years = 25.16(4.30)  · Mean STEPHANY score for males over 80 years = 23.29(6.02)  · Mean STEPHANY score for females over 80 years = 17.20(8.32)            Physical Therapy Evaluation Charge Determination   History Examination Presentation Decision-Making   MEDIUM  Complexity : 1-2 comorbidities / personal factors will impact the outcome/ POC  HIGH Complexity : 4+ Standardized tests and measures addressing body structure, function, activity limitation and / or participation in recreation  MEDIUM Complexity : Evolving with changing characteristics  MEDIUM Complexity : FOTO score of 26-74      Based on the above components, the patient evaluation is determined to be of the following complexity level: MEDIUM    Pain Rating:  Verbal: no complaints     Activity Tolerance:   Fair  Please refer to the flowsheet for vital signs taken during this treatment. After treatment patient left in no apparent distress:   Sitting in chair, Call bell within reach, and Bed / chair alarm activated    COMMUNICATION/EDUCATION:   The patients plan of care was discussed with: Registered Nurse. Fall prevention education was provided and the patient/caregiver indicated understanding. and Patient is unable to participate in goal setting and plan of care.     Thank you for this referral.  Cookie Brock   Time Calculation: 20 mins

## 2019-12-03 NOTE — PROGRESS NOTES
Problem: Dysphagia (Adult)  Goal: *Acute Goals and Plan of Care (Insert Text)  Description  Speech Therapy Goals  Initiated 11/29/2019  1. Patient will tolerate medication crushed in puree without overt s/s aspiration within 7 days. MET  2. Patient will participate in re-evaluation of swallow function within 7 days. MET  Initiated 12/2/2019  1. Patient will tolerate regular diet, thin liquids with careful feeding free of s/s of aspiration within 7 days   Outcome: Progressing Towards Goal    Speech Path    Chart reviewed; discussed with RN. RN reporting good tolerance of regular diet today with no s/s of aspiration consistent with SLP evaluation yesterday. She does feel most comfortable with supervision at meals which seems reasonable. Will follow-up later in the week to ensure continued tolerance. Please page in the meantime if concerns arise. Callie Dahl MS, CCC-SLP, BCS-S

## 2019-12-03 NOTE — PROGRESS NOTES
[facility]                                                                                          Hospitalist Progress Note  Declan Jerez MD  Answering service: [phone] or [phone] from in house phone        Date of Service:  12/3/2019  NAME:  Lorie Gillis  :  1935  MRN:  [id number]      Admission Summary:       CC: altered mental state        HPI: 81 y.o man w/ afib, CVA, HTN, COPD, probable dementia, nursing home resident, who presents with AMS. He was reportedly more confused than baseline during the day. He was seen by [facility] and was diagnosed with a UTI based on a urine dipstick and cellulitis of the right arm. He was sent to the ED due to the increase in confusion. His son describes that he is confused at baseline and the severity is variable, and that he is back at baseline currently. He has not been formally diagnosed with dementia. No known fever or pain. The patient denies any complaints but he is a poor historian.       Interval history / Subjective:       Patient is seen and examined at bedside. He is awake and alert but not oriented. Still has right arm erythema. Discussed with nursing. Discussed with Ortho PA     Assessment & Plan:     R arm cellulitis with R elbow effusion: not improving   -s/p arthrocentesis .   - XR:  Nonspecific joint effusion. No evidence of fracture or osteomyelitis  - Ortho on board: may consider MRI if needed   - Cx so far negative  - dced IV vanco. C/w IV ceftriaxone  - ID consult placed     UTI ruled out   - this is based on an outpatient urine dipstick that showed +++ leukocyte esterase and + nitrite. - urine cx : negative     Acute metabolic encephalopathy - improving  Underlying dementia   - CT head: No evidence of acute infarct or intracranial hemorrhage. Periventricular white matter disease is likely secondary to chronic small vessel ischemic changes. Chronic left posterior parietal infarct.   - supportive care  - Acute agitation on 11/28 pm. Haldol x 1 dose given     Chronic atrial fibrillation: continue diltiazem, metoprolol and warfarin  Supra therapeutic INR - coumadin on hold      COPD w/ chronic hypoxic respiratory failure: stable  -on 2l NC O2  -inhaled bronchodilators    HTN: Bp stable. continue lisinopril, diltiazem, metoprolol , amlodipine , lasix    History of alcohol abuse: remote, resolved per son   History of CVA: continue aspirin, statin       DVT ppx: anticoagulated  Code status: DNR - son states he has a DDNR at home  Care Plan discussed with: Patient/Family  Disposition: TBD. PT/OT eval      Hospital Problems  Never Reviewed          Codes Class Noted POA    Cellulitis of right arm ICD-10-CM: L03.113  ICD-9-CM: 682.3  11/27/2019 Unknown                Review of Systems:   Review of systems not obtained due to patient factors. Vital Signs:    Last 24hrs VS reviewed since prior progress note. Most recent are:  Visit Vitals  BP (P) 131/82 (BP 1 Location: Left arm, BP Patient Position: At rest)   Pulse (P) 88   Temp (P) 97.8 °F (36.6 °C)   Resp (P) 17   Ht 5' 10\" (1.778 m)   Wt 81.6 kg (179 lb 14.3 oz)   SpO2 (P) 92%   BMI 25.81 kg/m²       No intake or output data in the 24 hours ending 12/03/19 1724     Physical Examination:             Constitutional:  No acute distress,     ENT:  Oral mucous moist, oropharynx benign. Resp:  No wheezing/rhonchi/rales. No accessory muscle use   CV:  Regular rhythm, normal rate, no murmurs, gallops, rubs    GI:  Soft, non distended, non tender. normoactive bowel sounds, no hepatosplenomegaly     Musculoskeletal:  No edema, warm, 2+ pulses throughout    Neurologic:  Moves all extremities.   Demented      Psych:  Smiles  , not agitated not anxious   Skin:  Good  turgor, cellulitis rt arm , edematous       Data Review:    Review and/or order of clinical lab test      Labs:     Recent Labs     12/03/19  0401   WBC 11.0   HGB 14.2   HCT 46.8    Recent Labs     12/03/19 0401 12/02/19 0225 12/01/19 0209    143 142   K 3.5 3.6 3.7    107 107   CO2 31 33* 31   BUN 13 14 17   CREA 0.83 0.75 0.81   GLU 95 112* 126*   CA 9.2 9.3 9.2     No results for input(s): SGOT, GPT, ALT, AP, TBIL, TBILI, TP, ALB, GLOB, GGT, AML, LPSE in the last 72 hours. No lab exists for component: AMYP, HLPSE  Recent Labs     12/03/19 0401 12/02/19 0225 12/01/19 0209   INR 4.3* 4.4* 4.2*   PTP 40.3* 41.3* 39.7*      No results for input(s): FE, TIBC, PSAT, FERR in the last 72 hours. No results found for: FOL, RBCF   No results for input(s): PH, PCO2, PO2 in the last 72 hours. No results for input(s): CPK, CKNDX, TROIQ in the last 72 hours.     No lab exists for component: CPKMB  No results found for: CHOL, CHOLX, CHLST, CHOLV, HDL, HDLP, LDL, LDLC, DLDLP, TGLX, TRIGL, TRIGP, CHHD, CHHDX  No results found for: [facility]  Lab Results   Component Value Date/Time    Color YELLOW/STRAW 11/27/2019 09:08 PM    Appearance CLOUDY (A) 11/27/2019 09:08 PM    Specific gravity 1.020 11/27/2019 09:08 PM    pH (UA) 6.0 11/27/2019 09:08 PM    Protein 100 (A) 11/27/2019 09:08 PM    Glucose NEGATIVE  11/27/2019 09:08 PM    Ketone TRACE (A) 11/27/2019 09:08 PM    Bilirubin NEGATIVE  11/27/2019 09:08 PM    Urobilinogen 1.0 11/27/2019 09:08 PM    Nitrites NEGATIVE  11/27/2019 09:08 PM    Leukocyte Esterase LARGE (A) 11/27/2019 09:08 PM    Epithelial cells FEW 11/27/2019 09:08 PM    Bacteria NEGATIVE  11/27/2019 09:08 PM    WBC >100 (H) 11/27/2019 09:08 PM    RBC >100 (H) 11/27/2019 09:08 PM         Medications Reviewed:     Current Facility-Administered Medications   Medication Dose Route Frequency    albuterol-ipratropium (DUO-NEB) 2.5 MG-0.5 MG/3 ML  3 mL Nebulization Q6H PRN    lactobac ac& pc-s.therm-b.anim (HELADIO Q/RISAQUAD)  1 Cap Oral DAILY    arformoterol (BROVANA) neb solution 15 mcg  15 mcg Nebulization BID RT    And    budesonide (PULMICORT) 500 mcg/2 ml nebulizer suspension  500 mcg Nebulization BID RT    traMADol (ULTRAM) tablet 50 mg  50 mg Oral Q6H PRN    fentaNYL citrate (PF) injection 25 mcg  25 mcg IntraVENous Q4H PRN    hydrALAZINE (APRESOLINE) 20 mg/mL injection 20 mg  20 mg IntraVENous Q6H PRN    lisinopril (PRINIVIL, ZESTRIL) tablet 10 mg  10 mg Oral DAILY    amLODIPine (NORVASC) tablet 5 mg  5 mg Oral DAILY    sodium chloride (NS) flush 5-40 mL  5-40 mL IntraVENous Q8H    sodium chloride (NS) flush 5-40 mL  5-40 mL IntraVENous PRN    acetaminophen (TYLENOL) tablet 650 mg  650 mg Oral Q4H PRN    ondansetron (ZOFRAN) injection 4 mg  4 mg IntraVENous Q4H PRN    cefTRIAXone (ROCEPHIN) 1 g in 0.9% sodium chloride (MBP/ADV) 50 mL  1 g IntraVENous Q24H    aspirin delayed-release tablet 81 mg  81 mg Oral DAILY    atorvastatin (LIPITOR) tablet 40 mg  40 mg Oral DAILY    dilTIAZem (CARDIZEM) IR tablet 30 mg  30 mg Oral TIDAC    furosemide (LASIX) tablet 20 mg  20 mg Oral DAILY    metoprolol tartrate (LOPRESSOR) tablet 50 mg  50 mg Oral BID    thiamine HCL (B-1) tablet 100 mg  100 mg Oral DAILY     ______________________________________________________________________  EXPECTED LENGTH OF STAY: - - -  ACTUAL LENGTH OF STAY:          6                 Jessica Canales MD

## 2019-12-03 NOTE — PROGRESS NOTES
Spiritual Care Assessment/Progress Note  [facility]      NAME: Joan Urbina      MRN: [id number]  AGE: 81 y.o.  SEX: male  Restorationism Affiliation: Non Pentecostal   Language: English     12/3/2019     Total Time (in minutes): 10     Spiritual Assessment begun in [facility] 5W1 ORTHO SPINE through conversation with:         [x]Patient        [] Family    [] Friend(s)        Reason for Consult: Initial/Spiritual assessment, patient floor     Spiritual beliefs: (Please include comment if needed)     [x] Identifies with a yenifer tradition:  Records state Non-Taoist       [] Supported by a yenifer community:            [] Claims no spiritual orientation:           [] Seeking spiritual identity:                [] Adheres to an individual form of spirituality:           [] Not able to assess:                           Identified resources for coping:      [] Prayer                               [] Music                  [] Guided Imagery     [] Family/friends                 [] Pet visits     [] Devotional reading                         [] Unknown     [] Other:                                               Interventions offered during this visit: (See comments for more details)    Patient Interventions: Affirmation of emotions/emotional suffering, Coping skills reviewed/reinforced, Iconic (affirming the presence of God/Higher Power)           Plan of Care:     [] Support spiritual and/or cultural needs    [] Support AMD and/or advance care planning process      [] Support grieving process   [] Coordinate Rites and/or Rituals    [] Coordination with community clergy   [] No spiritual needs identified at this time   [] Detailed Plan of Care below (See Comments)  [] Make referral to Music Therapy  [] Make referral to Pet Therapy     [] Make referral to Addiction services  [] Make referral to [facility]  [] Make referral to Spiritual Care Partner  [] No future visits requested        [x] Follow up visits as needed Comments:  visit for initial spiritual assessment. Patient sitting in chair at bedside. Good eye contact, smiling, friendly. Appears slightly confused, but answers questions appropriately. Has some difficulty hearing. Says he is feeling good today. Provided spiritual presence and listening as he spoke briefly about his current thoughts, feelings, and concerns. When asked how we can best serve him during his hospitalization, he says he is quite comfortable as the staff have been very attentive. He appeared encouraged as a result of this visit and expressed gratitude for this visit. Visited by Rev. Farhad Murcia MDiv, [facility], [facility]   paging service: [phone])

## 2019-12-04 ENCOUNTER — APPOINTMENT (OUTPATIENT)
Dept: VASCULAR SURGERY | Age: 84
DRG: 602 | End: 2019-12-04
Attending: INTERNAL MEDICINE
Payer: MEDICARE

## 2019-12-04 LAB
ANION GAP SERPL CALC-SCNC: 5 MMOL/L (ref 5–15)
BASOPHILS # BLD: 0.1 K/UL (ref 0–0.1)
BASOPHILS NFR BLD: 1 % (ref 0–1)
BUN SERPL-MCNC: 13 MG/DL (ref 6–20)
BUN/CREAT SERPL: 14 (ref 12–20)
CALCIUM SERPL-MCNC: 9.6 MG/DL (ref 8.5–10.1)
CHLORIDE SERPL-SCNC: 103 MMOL/L (ref 97–108)
CO2 SERPL-SCNC: 36 MMOL/L (ref 21–32)
CREAT SERPL-MCNC: 0.9 MG/DL (ref 0.7–1.3)
DIFFERENTIAL METHOD BLD: ABNORMAL
EOSINOPHIL # BLD: 0.3 K/UL (ref 0–0.4)
EOSINOPHIL NFR BLD: 2 % (ref 0–7)
ERYTHROCYTE [DISTWIDTH] IN BLOOD BY AUTOMATED COUNT: 12.9 % (ref 11.5–14.5)
GLUCOSE SERPL-MCNC: 120 MG/DL (ref 65–100)
HCT VFR BLD AUTO: 45.3 % (ref 36.6–50.3)
HGB BLD-MCNC: 13.8 G/DL (ref 12.1–17)
IMM GRANULOCYTES # BLD AUTO: 0 K/UL (ref 0–0.04)
IMM GRANULOCYTES NFR BLD AUTO: 0 % (ref 0–0.5)
INR PPP: 3.6 (ref 0.9–1.1)
LYMPHOCYTES # BLD: 1 K/UL (ref 0.8–3.5)
LYMPHOCYTES NFR BLD: 8 % (ref 12–49)
MCH RBC QN AUTO: 30.7 PG (ref 26–34)
MCHC RBC AUTO-ENTMCNC: 30.5 G/DL (ref 30–36.5)
MCV RBC AUTO: 100.9 FL (ref 80–99)
MONOCYTES # BLD: 0.9 K/UL (ref 0–1)
MONOCYTES NFR BLD: 7 % (ref 5–13)
NEUTS SEG # BLD: 10.3 K/UL (ref 1.8–8)
NEUTS SEG NFR BLD: 82 % (ref 32–75)
NRBC # BLD: 0 K/UL (ref 0–0.01)
NRBC BLD-RTO: 0 PER 100 WBC
PLATELET # BLD AUTO: 290 K/UL (ref 150–400)
PMV BLD AUTO: 9.6 FL (ref 8.9–12.9)
POTASSIUM SERPL-SCNC: 3.3 MMOL/L (ref 3.5–5.1)
PROTHROMBIN TIME: 34.5 SEC (ref 9–11.1)
RBC # BLD AUTO: 4.49 M/UL (ref 4.1–5.7)
SODIUM SERPL-SCNC: 144 MMOL/L (ref 136–145)
WBC # BLD AUTO: 12.5 K/UL (ref 4.1–11.1)

## 2019-12-04 PROCEDURE — 80048 BASIC METABOLIC PNL TOTAL CA: CPT

## 2019-12-04 PROCEDURE — 94640 AIRWAY INHALATION TREATMENT: CPT

## 2019-12-04 PROCEDURE — 36415 COLL VENOUS BLD VENIPUNCTURE: CPT

## 2019-12-04 PROCEDURE — 74011250636 HC RX REV CODE- 250/636: Performed by: INTERNAL MEDICINE

## 2019-12-04 PROCEDURE — 77010033678 HC OXYGEN DAILY

## 2019-12-04 PROCEDURE — 74011250637 HC RX REV CODE- 250/637: Performed by: INTERNAL MEDICINE

## 2019-12-04 PROCEDURE — 85610 PROTHROMBIN TIME: CPT

## 2019-12-04 PROCEDURE — 65270000029 HC RM PRIVATE

## 2019-12-04 PROCEDURE — 74011250637 HC RX REV CODE- 250/637: Performed by: HOSPITALIST

## 2019-12-04 PROCEDURE — 74011000250 HC RX REV CODE- 250: Performed by: INTERNAL MEDICINE

## 2019-12-04 PROCEDURE — 97530 THERAPEUTIC ACTIVITIES: CPT

## 2019-12-04 PROCEDURE — 93971 EXTREMITY STUDY: CPT

## 2019-12-04 PROCEDURE — 97535 SELF CARE MNGMENT TRAINING: CPT

## 2019-12-04 PROCEDURE — 85025 COMPLETE CBC W/AUTO DIFF WBC: CPT

## 2019-12-04 PROCEDURE — 74011000258 HC RX REV CODE- 258: Performed by: INTERNAL MEDICINE

## 2019-12-04 RX ORDER — VANCOMYCIN 2 GRAM/500 ML IN 0.9 % SODIUM CHLORIDE INTRAVENOUS
2000 ONCE
Status: COMPLETED | OUTPATIENT
Start: 2019-12-04 | End: 2019-12-04

## 2019-12-04 RX ORDER — POTASSIUM CHLORIDE 750 MG/1
40 TABLET, FILM COATED, EXTENDED RELEASE ORAL
Status: COMPLETED | OUTPATIENT
Start: 2019-12-04 | End: 2019-12-04

## 2019-12-04 RX ORDER — VANCOMYCIN HYDROCHLORIDE
1250
Status: DISCONTINUED | OUTPATIENT
Start: 2019-12-05 | End: 2019-12-07

## 2019-12-04 RX ADMIN — METOPROLOL TARTRATE 50 MG: 50 TABLET, FILM COATED ORAL at 09:58

## 2019-12-04 RX ADMIN — CEFEPIME HYDROCHLORIDE 2 G: 2 INJECTION, POWDER, FOR SOLUTION INTRAVENOUS at 15:55

## 2019-12-04 RX ADMIN — ATORVASTATIN CALCIUM 40 MG: 40 TABLET, FILM COATED ORAL at 09:59

## 2019-12-04 RX ADMIN — BUDESONIDE 500 MCG: 0.5 INHALANT RESPIRATORY (INHALATION) at 21:13

## 2019-12-04 RX ADMIN — LISINOPRIL 10 MG: 10 TABLET ORAL at 09:59

## 2019-12-04 RX ADMIN — Medication 10 ML: at 06:19

## 2019-12-04 RX ADMIN — Medication 10 ML: at 01:26

## 2019-12-04 RX ADMIN — FUROSEMIDE 20 MG: 20 TABLET ORAL at 09:59

## 2019-12-04 RX ADMIN — ASPIRIN 81 MG: 81 TABLET, COATED ORAL at 09:58

## 2019-12-04 RX ADMIN — VANCOMYCIN HYDROCHLORIDE 2000 MG: 10 INJECTION, POWDER, LYOPHILIZED, FOR SOLUTION INTRAVENOUS at 17:32

## 2019-12-04 RX ADMIN — POTASSIUM CHLORIDE 40 MEQ: 750 TABLET, FILM COATED, EXTENDED RELEASE ORAL at 09:58

## 2019-12-04 RX ADMIN — AMLODIPINE BESYLATE 5 MG: 5 TABLET ORAL at 09:58

## 2019-12-04 RX ADMIN — Medication 1 CAPSULE: at 09:58

## 2019-12-04 RX ADMIN — Medication 100 MG: at 09:59

## 2019-12-04 RX ADMIN — METOPROLOL TARTRATE 50 MG: 50 TABLET, FILM COATED ORAL at 17:34

## 2019-12-04 RX ADMIN — DILTIAZEM HYDROCHLORIDE 30 MG: 30 TABLET, FILM COATED ORAL at 07:28

## 2019-12-04 RX ADMIN — Medication 10 ML: at 23:25

## 2019-12-04 RX ADMIN — DILTIAZEM HYDROCHLORIDE 30 MG: 30 TABLET, FILM COATED ORAL at 17:33

## 2019-12-04 NOTE — PROGRESS NOTES
Pharmacist Note - Warfarin Dosing  Consult provided for this 84 y.o.male to manage warfarin for Atrial Fibrillation    INR Goal: 2 - 3    Home regimen/ tablet size: 4 mg daily    Drugs that may increase INR: None  Drugs that may decrease INR: None  Other current anticoagulants/ drugs that may increase bleeding risk: Aspirin  Risk factors: Age > 65  Daily INR ordered: YES    Recent Labs     12/04/19  0352 12/03/19  0401 12/02/19  0225   HGB 13.8 14.2  --    INR 3.6* 4.3* 4.4*     Date               INR                  Dose  11/27  2.7  4 mg (took PTA)   11/28  2.6  4 mg   11/29  3  2.5 mg   11/30               2.7                  3 mg  12/01               4.2                  Held  12/02  4.4  Held  -- Consult discontinued - Reconsulted 12/04 --    12/04  3.6  Hold                                                                                Assessment/ Plan:  INR peaked on 12/02 at 4.4 -> 4.3 ->3.6. Will HOLD the warfarin dose today for an INR of 3.6. Likely will resume dosing tomorrow regardless of INR. Pharmacy will continue to monitor daily and adjust therapy as indicated. Please contact the pharmacist at  for outpatient recommendations if needed.

## 2019-12-04 NOTE — PROGRESS NOTES
Pharmacist Note - Vancomycin Dosing    Consult provided for this 81 y.o. male for indication of RUE cellulitis and edema/MRSA coverage. Antibiotic regimen(s): Vancomycin and cefepime    Recent Labs     19  0352 19  0401 19  0225   WBC 12.5* 11.0  --    CREA 0.90 0.83 0.75   BUN 13 13 14   Frequency of BMP: Daily x 3  Height: 177.8 cm  Weight: 81.6 kg  Est CrCl: 62.9 ml/min; UO: -- ml/kg/hr (not assessed)  Temp (24hrs), Av.6 °F (36.4 °C), Min:97 °F (36.1 °C), Max:97.9 °F (36.6 °C)    Cultures:    Blood: NG - Final   Elbow aspirate: NGTD (holding 14days) - pending   Urine: <10k no significant growth - Final    Goal trough = 10 - 15 mcg/mL    Therapy will be initiated with a loading dose of 2000 mg IV x 1 to be followed by a maintenance dose of 1250 mg IV every 18 hours. Pharmacy to follow patient daily and order levels / make dose adjustments as appropriate.     Rosaline Gaspar, PharmD  Clinical Pharmacist, Orthopedics and Med/Surg () [id number] [phone]

## 2019-12-04 NOTE — PROGRESS NOTES
BELLA:  1. SNF choice pending. 2. BLS transport. CM contacted Akiko with [facility], and they recommended the patient transition to a SNF before returning to [facility]. He will also need authorization. CM will follow-up this afternoon for choice.      Dottie Parson, [facility]

## 2019-12-04 NOTE — PROGRESS NOTES
Problem: Self Care Deficits Care Plan (Adult)  Goal: *Acute Goals and Plan of Care (Insert Text)  Description    FUNCTIONAL STATUS PRIOR TO ADMISSION: lives in SNF, hx of dementia and confusion per chart, patient reports mobility without equipment     HOME SUPPORT: per chart has son in area    Occupational Therapy Goals  Initiated 11/29/2019  1. Patient will perform self-feeding with supervision/set-up using right dominant hand within 7 day(s). 2.  Patient will perform grooming with supervision/set-up within 7 day(s). 3.  Patient will perform upper body dressing with minimal assistance/contact guard assist within 7 day(s). 4.  Patient will perform toilet transfers with minimal assistance/contact guard assist and best technique within 7 day(s). 5.  Patient will tolerate AAROM right UE all joints with minimal complaint of pain within 7 day(s). Outcome: Progressing Towards Goal   OCCUPATIONAL THERAPY TREATMENT  Patient: Eduar Elkins (85 y.o. male)  Date: 12/4/2019  Diagnosis: Cellulitis of right arm [L03.113]   <principal problem not specified>       Precautions: Fall, Skin  Chart, occupational therapy assessment, plan of care, and goals were reviewed. ASSESSMENT  Patient continues with skilled OT services and is progressing towards goals. Pt received supine in bed after working with PT. Pt returned back to bed d/t increase SOB and de-staturation levels with minimal activity. Per preliminary report, negative DVT R UE. Pt had bowel/bladder accident and was total assist with hygiene (rolling side to side in bed). After pt cleaned, son returned back to room. Educated and instructed son to elevate R UE. Son is aware that his father is unable to understand positioning of R UE d/t his decrease cognition. It should be noted that pt is incapable of being compliant d/t his decrease memory. It has nothing to do with him being non-compliant. Will con't to follow.        Current Level of Function Impacting Discharge (ADLs): total assist care with hygiene    Other factors to consider for discharge: confusion         PLAN :  Patient continues to benefit from skilled intervention to address the above impairments. Continue treatment per established plan of care. to address goals. Recommend with staff: Madie Orr for meals if able (O2 levels remain >90%)    Recommend next OT session: R UE management, grooming     Recommendation for discharge: (in order for the patient to meet his/her long term goals)  Therapy up to 5 days/week in SNF setting    This discharge recommendation:  A follow-up discussion with the attending provider and/or case management is planned    IF patient discharges home will need the following DME: to be determined (TBD)       SUBJECTIVE:   Patient agreeable     OBJECTIVE DATA SUMMARY:   Cognitive/Behavioral Status:  Neurologic State: Confused  Orientation Level: Oriented to person  Cognition: Follows commands             Functional Mobility and Transfers for ADLs:  Bed Mobility:  Rolling: Moderate assistance;Assist x2  Supine to Sit: Moderate assistance;Assist x1;Adaptive equipment; Additional time  Sit to Supine: Maximum assistance;Assist x2  Scooting: Maximum assistance;Assist x2    Transfers:  Sit to Stand: Moderate assistance;Assist x2; Additional time; Adaptive equipment          Balance:  Sitting: Impaired  Sitting - Static: Poor (constant support)  Sitting - Dynamic: Poor (constant support)  Standing: Impaired  Standing - Static: Constant support;Poor  Standing - Dynamic : Constant support;Poor    ADL Intervention:                      Upper Body [address]: Moderate assistance         Toileting  Toileting Assistance: Total assistance(dependent)  Bowel Hygiene:  Total assistance (dependent)  Clothing Management: Total assistance (dependent)           Pain:  No c/o pain    Activity Tolerance:   Fair  Please refer to the flowsheet for vital signs taken during this treatment.     After treatment patient left in no apparent distress:   Supine in bed, Call bell within reach, and Bed / chair alarm activated, son present    COMMUNICATION/COLLABORATION:   The patients plan of care was discussed with: Physical Therapist    Cindy Durham OTR/L  Time Calculation: 15 mins

## 2019-12-04 NOTE — CONSULTS
Infectious Disease Consult Note    Reason for Consult: RUE cellulitis   Date of Consultation: December 4, 2019  Date of Admission: 11/27/2019  Referring Physician: Meagan Renteria MD       HPI:    Mr Sarah Hays is a 81year old gentleman with Afib, COPD, HTN admitted on 11/27/19 with CC of \"altered mental state\". Patient is not a good historian and I am not able to obtain history from him. History obtained from chart review and from discussion with his RN today. From chart review, he is a nursing home resident. He was reportedly more confused than baseline and sent to the hospital.  [facility] and cellulitis of the right arm. Notes indicate that his son had mentioned he is confused at baseline and the severity is variable. \"  \"He has not been formally diagnosed with dementia\" per chart    Discussed with nursing. His nurse reports that he had more edema of the hand which is now improved. His pain is better controlled now. However the erythema has not improved     He is going to get an ultrasound of his upper extremity today. From chart review,  he has had an x-ray of the elbow of the right, chest x-ray and a CT of his head. Blood cultures on admission was negative. Ultrasound-guided aspiration of the right elbow done on 11/27/2019. It was a bloody specimen with greater than 100 cells 19,818 nucleated cells 98% neutrophils. No crystals seen. Fluid culture had 1+ WBCs but no organisms. Culture so far no growth. He was started on vancomycin and IV ceftriaxone. Vancomycin has been stopped. I am consulted today regarding right upper extremity cellulitis that has not improved. .        Past Medical History:  Past Medical History:   Diagnosis Date    Atrial fibrillation ([facility] [address]     Chronic obstructive pulmonary disease ([facility] [address]     Hypertension          Surgical History:  History reviewed. No pertinent surgical history.   Per chart review      Family History:   History reviewed. No pertinent family history. Per chart review      Social History:     Unable to obtain from the patient who is not able to give history  Possibly has dementia    Allergies: Allergies   Allergen Reactions    Seroquel [Quetiapine] Unknown (comments)   Per chart review      Review of Systems:    Unable to obtain from patient who possibly has dementia      Medications:  No current facility-administered medications on file prior to encounter. Current Outpatient Medications on File Prior to Encounter   Medication Sig Dispense Refill    aspirin delayed-release 81 mg tablet Take  by mouth daily.  atorvastatin (LIPITOR) 40 mg tablet Take 40 mg by mouth daily. At bedtime      dilTIAZem (CARDIZEM) 30 mg tablet Take 30 mg by mouth. Every 8 hours for HTN      furosemide (LASIX) 20 mg tablet Take 20 mg by mouth daily.  lisinopril (PRINIVIL, ZESTRIL) 5 mg tablet Take  by mouth daily.  loratadine 10 mg cap Take 10 mg by mouth daily.  metoprolol tartrate (LOPRESSOR) 50 mg tablet Take 50 mg by mouth two (2) times a day.  guaiFENesin (MUCINEX) 1,200 mg Ta12 ER tablet Take 1,200 mg by mouth two (2) times a day. PRN cough and congestion      thiamine HCL (B-1) 100 mg tablet Take 100 mg by mouth daily.  cyanocobalamin (VITAMIN B-12) 1,000 mcg tablet Take 1,000 mcg by mouth daily.  cholecalciferol, vitamin D3, (VITAMIN D3) 2,000 unit tab Take 5,000 Units by mouth daily.  warfarin (COUMADIN) 4 mg tablet Take 4 mg by mouth daily.  albuterol (PROVENTIL VENTOLIN) 2.5 mg /3 mL (0.083 %) nebu 2.5 mg by Nebulization route. BID for shortness of breath      albuterol (VENTOLIN HFA) 90 mcg/actuation inhaler Take  by inhalation every four (4) hours as needed for Wheezing.  acetaminophen (TYLENOL) 325 mg tablet Take 650 mg by mouth every four (4) hours as needed for Pain.       glycopyrrolate-formoterol (BEVESPI AEROSPHERE) 9-4.8 mcg HFAA Take 2 Puffs by inhalation two (2) times a day. Use with spacer      therapeutic multivitamin (THERAGRAN) tablet Take 1 Tab by mouth daily. Physical Exam:    Vitals:   Patient Vitals for the past 24 hrs:   Temp Pulse Resp BP SpO2   12/04/19 0958  92  155/70    12/04/19 0806 97 °F (36.1 °C) 92 17 159/80 92 %   12/04/19 0418 97.9 °F (36.6 °C) 92 18 155/83 90 %   12/03/19 1744  85  135/80    12/03/19 1421 97.8 °F (36.6 °C) 88 17 131/82 92 %   12/03/19 1403  (!) 118  166/61    12/03/19 1325  95  135/82    ·   · GEN: NAD  · HEENT: , no scleral icterus,  no cervical  lymphadenopathy, dentures no thrush  · CV: S1, S2 heard regularly,  · Lungs: Clear to auscultation bilaterally  · Abdomen: soft, non distended, nonfacial grimace to palpation   · Extremities: no edema  lower extremities  · Edema of right upper extremity and erythema  · Neuro: Alert, oriented to self, he could not tell me where he was . Follows simple commands  ·  skin: no rash  Musculoskeletal: No facial grimace to palpation of the right elbow, he is able to actively move the joint right elbow, good passive range of motion    Labs:   Recent Results (from the past 24 hour(s))   PROTHROMBIN TIME + INR    Collection Time: 12/04/19  3:52 AM   Result Value Ref Range    INR 3.6 (H) 0.9 - 1.1      Prothrombin time 34.5 (H) 9.0 - 11.1 sec   CBC WITH AUTOMATED DIFF    Collection Time: 12/04/19  3:52 AM   Result Value Ref Range    WBC 12.5 (H) 4.1 - 11.1 K/uL    RBC 4.49 4. 10 - 5.70 M/uL    HGB 13.8 12.1 - 17.0 g/dL    HCT 45.3 36.6 - 50.3 %    .9 (H) 80.0 - 99.0 FL    MCH 30.7 26.0 - 34.0 PG    MCHC 30.5 30.0 - 36.5 g/dL    RDW 12.9 11.5 - 14.5 %    PLATELET 392 823 - 540 K/uL    MPV 9.6 8.9 - 12.9 FL    NRBC 0.0 0  WBC    ABSOLUTE NRBC 0.00 0.00 - 0.01 K/uL    NEUTROPHILS 82 (H) 32 - 75 %    LYMPHOCYTES 8 (L) 12 - 49 %    MONOCYTES 7 5 - 13 %    EOSINOPHILS 2 0 - 7 %    BASOPHILS 1 0 - 1 %    IMMATURE GRANULOCYTES 0 0.0 - 0.5 % ABS. NEUTROPHILS 10.3 (H) 1.8 - 8.0 K/UL    ABS. LYMPHOCYTES 1.0 0.8 - 3.5 K/UL    ABS. MONOCYTES 0.9 0.0 - 1.0 K/UL    ABS. EOSINOPHILS 0.3 0.0 - 0.4 K/UL    ABS. BASOPHILS 0.1 0.0 - 0.1 K/UL    ABS. IMM. GRANS. 0.0 0.00 - 0.04 K/UL    DF AUTOMATED     METABOLIC PANEL, BASIC    Collection Time: 12/04/19  3:52 AM   Result Value Ref Range    Sodium 144 136 - 145 mmol/L    Potassium 3.3 (L) 3.5 - 5.1 mmol/L    Chloride 103 97 - 108 mmol/L    CO2 36 (H) 21 - 32 mmol/L    Anion gap 5 5 - 15 mmol/L    Glucose 120 (H) 65 - 100 mg/dL    BUN 13 6 - 20 MG/DL    Creatinine 0.90 0.70 - 1.30 MG/DL    BUN/Creatinine ratio 14 12 - 20      GFR est AA >60 >60 ml/min/1.73m2    GFR est non-AA >60 >60 ml/min/1.73m2    Calcium 9.6 8.5 - 10.1 MG/DL       Microbiology Data:       Blood: 11/27/19      Component Value Ref Range & Units Status   Special Requests: NO SPECIAL REQUESTS    Final   Culture result: NO GROWTH 5 DAYS    Final   Result History               Synovial/Joint fluid: 11/27/19       Component Value Ref Range & Units Status   Special Requests: NO SPECIAL REQUESTS    Preliminary   GRAM STAIN 1+ WBCS SEEN    Preliminary   GRAM STAIN NO ORGANISMS SEEN    Preliminary   Culture result:    Preliminary   Culture performed on Unspun Fluid    Culture result:    Preliminary   No growth thus far, holding 14 days. Result History       Urine 11/27/19  Component Value Ref Range & Units Status   Special Requests: NO SPECIAL REQUESTS    Final   [place] Count <10,000   COLONIES/mL    Final   Culture result: NO SIGNIFICANT GROWTH    Final   Result History           Imaging:   CT Head 11/27/19  IMPRESSION  Impression:   1. No evidence of acute infarct or intracranial hemorrhage. 2. Periventricular white matter disease is likely secondary to chronic small  vessel ischemic changes. 3. Chronic left posterior parietal infarct.      CXR 11/27/19   FINDINGS: PA and lateral views of the chest demonstrate a stable  cardiomediastinal silhouette and no focal airspace disease. There are small  bilateral pleural effusions. . The visualized osseous structures are  unremarkable.     IMPRESSION  IMPRESSION: Small bilateral pleural effusions.     Xray R elbow  11/27/19   FINDINGS: Three views of the right elbow demonstrate antecubital intravenous  access. Elbow joint effusion is moderate. Bones are osteopenic. No acute  fracture or evidence of osteomyelitis. Mild ulnotrochlear osteoarthritis.     IMPRESSION  IMPRESSION:      Nonspecific joint effusion. No evidence of fracture or osteomyelitis. Procedures:   11/27/19  R elbow US guided aspiration : aspirated 3 cc cloudy fluid     Assessment / Plan:     Mr. Nathan Morrell is an 78-year-old gentleman with hypertension, atrial fibrillation, COPD per the chart  admitted on 11/27/2019 with change in mental status and found to have right upper extremity cellulitis. He status post aspiration of the right elbow with cultures that are negative. No crystals seen in the fluid . Blood cultures negative . He continues to have cellulitis of the right upper extremity and edema.     1) right upper extremity cellulitis and edema  Unclear of any insect or animal bite, no clear puncture wound is except likely where he had ultrasound-guided aspiration with  Synovial fluid with 1+ WBCs and no growth on culture  Synovial fluid with 19,880 nucleated cells , 98% neutrophils   He has been on vancomycin which has been stopped  Currently on ceftriaxone    Recommendations  Given persistent erythema and edema of the right upper extremity recommend MRSA coverage with vancomycin  Recommend broadening ceftriaxone to cefepime IV  Anaerobic infections are not very common in this setting and will continue to monitor  Upper extremity elevation recommended  Recommend checking duplex to ensure there is no thromboembolism  Would be difficult given patient possibly has dementia to ensure that he complies with upper extremity elevation  Will need to monitor renal function very closely given age concern for nephrotoxicity with antibiotic use  High risk for C. Difficile and other antibiotic associated adverse effects   Probiotics daily  Vancomycin trough goal 10-15  Monitor for diarrhea      2) atrial fibrillation per chart  Noted on warfarin  Antibiotics can interfere with anticoagulants and need close INR monitoring per primary team's and pharmacy    3) hypertension     4) COPD per chart      5) DVT prophylaxis      Plan discussed with nursing on the floor    Thank for the opportunity to participate in the care of this patient. Please contact with questions or concerns.        Deshaun Steele,   12:42 PM

## 2019-12-04 NOTE — PROGRESS NOTES
[address] [facility] Adult  Hospitalist Group                                                                                          Hospitalist Progress Note  Milena Cat MD  Answering service: [phone] OR [phone] from in house phone        Date of Service:  2019  NAME:  Petra Burnham  :  1935  MRN:  [id number]      Admission Summary:       CC: altered mental state        HPI: 81 y.o man w/ afib, CVA, HTN, COPD, probable dementia, nursing home resident, who presents with AMS. He was reportedly more confused than baseline during the day. He was seen by [facility] and was diagnosed with a UTI based on a urine dipstick and cellulitis of the right arm. He was sent to the ED due to the increase in confusion. His son describes that he is confused at baseline and the severity is variable, and that he is back at baseline currently. He has not been formally diagnosed with dementia. No known fever or pain. The patient denies any complaints but he is a poor historian.       Interval history / Subjective:       Patient is seen and examined at bedside. He is awake and alert but not oriented. Still has right arm erythema and swelling. Discussed with nursing     Assessment & Plan:     R arm cellulitis with R elbow effusion: not improving   - s/p arthrocentesis .   - XR:  Nonspecific joint effusion. No evidence of fracture or osteomyelitis  - Ortho on board: may consider MRI if needed   - blood cx negative. Fluid cx so far negative  - dced IV vanco . C/w IV ceftriaxone  - ID consult placed on 12/3 but not called until   -mild leucocytosis 12.5  - venous duplex ordered today     Acute metabolic encephalopathy - improving  Underlying dementia   - CT head: No evidence of acute infarct or intracranial hemorrhage. Periventricular white matter disease is likely secondary to chronic small vessel ischemic changes. Chronic left posterior parietal infarct.   - supportive care  - Acute agitation on 11/28 pm. Haldol x 1 dose given     Chronic atrial fibrillation: rate controlled on diltiazem, metoprolol. Supra therapeutic INR - coumadin on hold. INR 3.6  today. Pharmacy to dose     COPD w/ chronic hypoxic respiratory failure: stable  -on 2l NC O2  -inhaled bronchodilators    UTI ruled out   - this is based on an outpatient urine dipstick that showed +++ leukocyte esterase and + nitrite. - urine cx 11/27: negative     Hypokalemia - replaced     HTN: Bp stable. continue lisinopril, diltiazem, metoprolol , amlodipine , lasix    History of alcohol abuse: remote, resolved per son  History of CVA: continue aspirin, statin       DVT ppx: anticoagulated  Code status: DNR - son states he has a DDNR at home  Care Plan discussed with: Patient/Family  Disposition: TBD. PT/OT eval      Hospital Problems  Never Reviewed          Codes Class Noted POA    Cellulitis of right arm ICD-10-CM: L03.113  ICD-9-CM: 682.3  11/27/2019 Unknown                Review of Systems:   Review of systems not obtained due to patient factors. Vital Signs:    Last 24hrs VS reviewed since prior progress note. Most recent are:  Visit Vitals  /70   Pulse 92   Temp 97 °F (36.1 °C)   Resp 17   Ht 5' 10\" (1.778 m)   Wt 81.6 kg (179 lb 14.3 oz)   SpO2 92%   BMI 25.81 kg/m²       No intake or output data in the 24 hours ending 12/04/19 1000     Physical Examination:             Constitutional:  No acute distress     ENT:  Oral mucous moist, oropharynx benign. Resp:  No wheezing/rhonchi/rales. No accessory muscle use   CV:  Regular rhythm, normal rate, no murmurs, gallops, rubs    GI:  Soft, non distended, non tender. normoactive bowel sounds, no hepatosplenomegaly     Musculoskeletal:  No edema, warm, 2+ pulses throughout    Neurologic:  Moves all extremities.   Demented      Psych: not agitated not anxious   Skin:  rt arm , edematous and erythematous        Data Review:    Review and/or order of clinical lab test      Labs:     Recent Labs     12/04/19 0352 12/03/19 0401   WBC 12.5* 11.0   HGB 13.8 14.2   HCT 45.3 46.8    251     Recent Labs     12/04/19 0352 12/03/19 0401 12/02/19 0225    144 143   K 3.3* 3.5 3.6    108 107   CO2 36* 31 33*   BUN 13 13 14   CREA 0.90 0.83 0.75   * 95 112*   CA 9.6 9.2 9.3     No results for input(s): SGOT, GPT, ALT, AP, TBIL, TBILI, TP, ALB, GLOB, GGT, AML, LPSE in the last 72 hours. No lab exists for component: AMYP, HLPSE  Recent Labs     12/04/19 0352 12/03/19 0401 12/02/19 0225   INR 3.6* 4.3* 4.4*   PTP 34.5* 40.3* 41.3*      No results for input(s): FE, TIBC, PSAT, FERR in the last 72 hours. No results found for: FOL, RBCF   No results for input(s): PH, PCO2, PO2 in the last 72 hours. No results for input(s): CPK, CKNDX, TROIQ in the last 72 hours.     No lab exists for component: CPKMB  No results found for: CHOL, CHOLX, CHLST, CHOLV, HDL, HDLP, LDL, LDLC, DLDLP, TGLX, TRIGL, TRIGP, CHHD, CHHDX  No results found for: [facility]  Lab Results   Component Value Date/Time    Color YELLOW/STRAW 11/27/2019 09:08 PM    Appearance CLOUDY (A) 11/27/2019 09:08 PM    Specific gravity 1.020 11/27/2019 09:08 PM    pH (UA) 6.0 11/27/2019 09:08 PM    Protein 100 (A) 11/27/2019 09:08 PM    Glucose NEGATIVE  11/27/2019 09:08 PM    Ketone TRACE (A) 11/27/2019 09:08 PM    Bilirubin NEGATIVE  11/27/2019 09:08 PM    Urobilinogen 1.0 11/27/2019 09:08 PM    Nitrites NEGATIVE  11/27/2019 09:08 PM    Leukocyte Esterase LARGE (A) 11/27/2019 09:08 PM    Epithelial cells FEW 11/27/2019 09:08 PM    Bacteria NEGATIVE  11/27/2019 09:08 PM    WBC >100 (H) 11/27/2019 09:08 PM    RBC >100 (H) 11/27/2019 09:08 PM         Medications Reviewed:     Current Facility-Administered Medications   Medication Dose Route Frequency    albuterol-ipratropium (DUO-NEB) 2.5 MG-0.5 MG/3 ML  3 mL Nebulization Q6H PRN    lactobac ac& pc-s.therm-b.anim (HELADIO Q/RISAQUAD)  1 Cap Oral DAILY    arformoterol (BROVANA) neb solution 15 mcg  15 mcg Nebulization BID RT    And    budesonide (PULMICORT) 500 mcg/2 ml nebulizer suspension  500 mcg Nebulization BID RT    traMADol (ULTRAM) tablet 50 mg  50 mg Oral Q6H PRN    fentaNYL citrate (PF) injection 25 mcg  25 mcg IntraVENous Q4H PRN    hydrALAZINE (APRESOLINE) 20 mg/mL injection 20 mg  20 mg IntraVENous Q6H PRN    lisinopril (PRINIVIL, ZESTRIL) tablet 10 mg  10 mg Oral DAILY    amLODIPine (NORVASC) tablet 5 mg  5 mg Oral DAILY    sodium chloride (NS) flush 5-40 mL  5-40 mL IntraVENous Q8H    sodium chloride (NS) flush 5-40 mL  5-40 mL IntraVENous PRN    acetaminophen (TYLENOL) tablet 650 mg  650 mg Oral Q4H PRN    ondansetron (ZOFRAN) injection 4 mg  4 mg IntraVENous Q4H PRN    cefTRIAXone (ROCEPHIN) 1 g in 0.9% sodium chloride (MBP/ADV) 50 mL  1 g IntraVENous Q24H    aspirin delayed-release tablet 81 mg  81 mg Oral DAILY    atorvastatin (LIPITOR) tablet 40 mg  40 mg Oral DAILY    dilTIAZem (CARDIZEM) IR tablet 30 mg  30 mg Oral TIDAC    furosemide (LASIX) tablet 20 mg  20 mg Oral DAILY    metoprolol tartrate (LOPRESSOR) tablet 50 mg  50 mg Oral BID    thiamine HCL (B-1) tablet 100 mg  100 mg Oral DAILY     ______________________________________________________________________  EXPECTED LENGTH OF STAY: - - -  ACTUAL LENGTH OF STAY:          7                 Victoria Stinson MD

## 2019-12-04 NOTE — PROGRESS NOTES
Occupational Therapy Note:  Chart reviewed. Attempted to see pt for OT tx. Pt transport arrived in room to take pt for ultrasound of RUE. Will follow up at later time as able as pt is currently unavailable.   Izabella Peres, OTR/L, CBIS

## 2019-12-04 NOTE — PROGRESS NOTES
Problem: Mobility Impaired (Adult and Pediatric)  Goal: *Acute Goals and Plan of Care (Insert Text)  Description  FUNCTIONAL STATUS PRIOR TO ADMISSION: Patient resides in assisted living and has dementia. Pt was unable to provide prior level of function due to dementia. Pt's son reports pt ambulated with a rolling walker. HOME SUPPORT PRIOR TO ADMISSION: Pt lives in assisted living facility. Physical Therapy Goals  Initiated 12/3/2019  1. Patient will move from supine to sit and sit to supine  in bed with supervision within 7 day(s). 2.  Patient will transfer from bed to chair and chair to bed with minimal assistance/contact guard assist using the least restrictive device within 7 day(s). 3.  Patient will perform sit to stand with minimal assistance/contact guard assist within 7 day(s). 4.  Patient will ambulate with minimal assistance/contact guard assist for 75 feet with the least restrictive device within 7 day(s). Outcome: Progressing Towards Goal   PHYSICAL THERAPY TREATMENT  Patient: Natalia Root (89 y.o. male)  Date: 12/4/2019  Diagnosis: Cellulitis of right arm [L03.113]   <principal problem not specified>       Precautions: Fall, Skin  Chart, physical therapy assessment, plan of care and goals were reviewed. ASSESSMENT  Patient continues with skilled PT services and is not progressing towards goals. The patient presents with confusion, calm and cooperative, decreased tolerance for activity with +ROMAN noted upon sitting, SPO2 87%, decreased generalized strength, cellulitis RUE with significant edema and erythema, decreased sitting and standing balance, and impaired functional mobility. Pt initially requested to use the bathroom. Pt assisted to standing position with rolling walker to use urinal however incontinent of bowel and bladder. Pt quickly fatigued quickly, +ROMAN.   Upon returning to edge of bed pt with excessive posterior lean, required significant assistance to transfer sit to supine. SPO2 93 - 99% in supine. Pt rolled side to side with total assistance for hygiene, brief and gown change, and bed linens changed. Overall slow progress with significantly decreased activity tolerance and incontinence greatly limited his mobility today. Current Level of Function Impacting Discharge (mobility/balance): moderate to maximum assist x 2 bed mobility, sit to stand transfers with moderate assist x 2    Other factors to consider for discharge: dementia, may be less confused in a familiar environment, fall risk, pt has periods of agitation         PLAN :  Patient continues to benefit from skilled intervention to address the above impairments. Continue treatment per established plan of care. to address goals. Recommendation for discharge: (in order for the patient to meet his/her long term goals)  Therapy up to 5 days/week in SNF setting    This discharge recommendation:  Has been made in collaboration with the attending provider and/or case management    IF patient discharges home will need the following DME: uses a rolling walker at baseline, will continue to assess for equipment pending progress       SUBJECTIVE:   Patient stated I need to pee.     OBJECTIVE DATA SUMMARY:   Critical Behavior:  Neurologic State: Confused  Orientation Level: Oriented to person, did not recognize his son  Cognition: Follows commands  Safety/Judgement: Decreased awareness of need for safety, Decreased insight into deficits, Decreased awareness of need for assistance, Decreased awareness of environment  Functional Mobility Training:  Bed Mobility:  Rolling: Moderate assistance;Assist x2  Supine to Sit: Moderate assistance;Assist x1;Adaptive equipment; Additional time  Sit to Supine: Maximum assistance;Assist x2  Scooting: Maximum assistance;Assist x2        Transfers:  Sit to Stand: Moderate assistance;Assist x2; Additional time; Adaptive equipment  Stand to Sit: Moderate assistance;Assist x2; Additional time Balance:  Sitting: Impaired  Sitting - Static: Poor (constant support)  Sitting - Dynamic: Poor (constant support)  Standing: Impaired  Standing - Static: Constant support;Poor  Standing - Dynamic : Constant support;Poor    Pain Rating:  No complaints    Activity Tolerance:   Poor, desaturates with minimal activity, 87% with sitting EOB, +ROMAN  Please refer to the flowsheet for vital signs taken during this treatment.     After treatment patient left in no apparent distress:   Supine in bed, Call bell within reach, and Bed / chair alarm activated    COMMUNICATION/COLLABORATION:   The patients plan of care was discussed with: Registered Nurse    Cookie Woods   Time Calculation: 25 mins

## 2019-12-05 LAB
ANION GAP SERPL CALC-SCNC: 2 MMOL/L (ref 5–15)
BASOPHILS # BLD: 0 K/UL (ref 0–0.1)
BASOPHILS NFR BLD: 0 % (ref 0–1)
BUN SERPL-MCNC: 13 MG/DL (ref 6–20)
BUN/CREAT SERPL: 16 (ref 12–20)
CALCIUM SERPL-MCNC: 9.8 MG/DL (ref 8.5–10.1)
CHLORIDE SERPL-SCNC: 104 MMOL/L (ref 97–108)
CO2 SERPL-SCNC: 39 MMOL/L (ref 21–32)
CREAT SERPL-MCNC: 0.79 MG/DL (ref 0.7–1.3)
DIFFERENTIAL METHOD BLD: ABNORMAL
EOSINOPHIL # BLD: 0.2 K/UL (ref 0–0.4)
EOSINOPHIL NFR BLD: 1 % (ref 0–7)
ERYTHROCYTE [DISTWIDTH] IN BLOOD BY AUTOMATED COUNT: 13 % (ref 11.5–14.5)
GLUCOSE SERPL-MCNC: 116 MG/DL (ref 65–100)
HCT VFR BLD AUTO: 43.9 % (ref 36.6–50.3)
HGB BLD-MCNC: 13.2 G/DL (ref 12.1–17)
IMM GRANULOCYTES # BLD AUTO: 0.1 K/UL (ref 0–0.04)
IMM GRANULOCYTES NFR BLD AUTO: 1 % (ref 0–0.5)
INR PPP: 2.9 (ref 0.9–1.1)
LYMPHOCYTES # BLD: 0.9 K/UL (ref 0.8–3.5)
LYMPHOCYTES NFR BLD: 7 % (ref 12–49)
MCH RBC QN AUTO: 30.8 PG (ref 26–34)
MCHC RBC AUTO-ENTMCNC: 30.1 G/DL (ref 30–36.5)
MCV RBC AUTO: 102.6 FL (ref 80–99)
MONOCYTES # BLD: 0.8 K/UL (ref 0–1)
MONOCYTES NFR BLD: 7 % (ref 5–13)
NEUTS SEG # BLD: 10 K/UL (ref 1.8–8)
NEUTS SEG NFR BLD: 84 % (ref 32–75)
NRBC # BLD: 0 K/UL (ref 0–0.01)
NRBC BLD-RTO: 0 PER 100 WBC
PLATELET # BLD AUTO: 279 K/UL (ref 150–400)
PMV BLD AUTO: 9.5 FL (ref 8.9–12.9)
POTASSIUM SERPL-SCNC: 3.5 MMOL/L (ref 3.5–5.1)
PROTHROMBIN TIME: 27.4 SEC (ref 9–11.1)
RBC # BLD AUTO: 4.28 M/UL (ref 4.1–5.7)
SODIUM SERPL-SCNC: 145 MMOL/L (ref 136–145)
WBC # BLD AUTO: 11.9 K/UL (ref 4.1–11.1)

## 2019-12-05 PROCEDURE — 92526 ORAL FUNCTION THERAPY: CPT | Performed by: SPEECH-LANGUAGE PATHOLOGIST

## 2019-12-05 PROCEDURE — 74011250637 HC RX REV CODE- 250/637: Performed by: INTERNAL MEDICINE

## 2019-12-05 PROCEDURE — 77010033678 HC OXYGEN DAILY

## 2019-12-05 PROCEDURE — 74011250636 HC RX REV CODE- 250/636: Performed by: INTERNAL MEDICINE

## 2019-12-05 PROCEDURE — 74011000250 HC RX REV CODE- 250: Performed by: INTERNAL MEDICINE

## 2019-12-05 PROCEDURE — 85610 PROTHROMBIN TIME: CPT

## 2019-12-05 PROCEDURE — 80048 BASIC METABOLIC PNL TOTAL CA: CPT

## 2019-12-05 PROCEDURE — 94640 AIRWAY INHALATION TREATMENT: CPT

## 2019-12-05 PROCEDURE — 74011250637 HC RX REV CODE- 250/637: Performed by: HOSPITALIST

## 2019-12-05 PROCEDURE — 85025 COMPLETE CBC W/AUTO DIFF WBC: CPT

## 2019-12-05 PROCEDURE — 74011000258 HC RX REV CODE- 258: Performed by: INTERNAL MEDICINE

## 2019-12-05 PROCEDURE — 36415 COLL VENOUS BLD VENIPUNCTURE: CPT

## 2019-12-05 PROCEDURE — 65270000029 HC RM PRIVATE

## 2019-12-05 RX ORDER — WARFARIN 4 MG/1
4 TABLET ORAL ONCE
Status: DISCONTINUED | OUTPATIENT
Start: 2019-12-05 | End: 2019-12-05

## 2019-12-05 RX ADMIN — BUDESONIDE 500 MCG: 0.5 INHALANT RESPIRATORY (INHALATION) at 20:02

## 2019-12-05 RX ADMIN — METOPROLOL TARTRATE 50 MG: 50 TABLET, FILM COATED ORAL at 10:02

## 2019-12-05 RX ADMIN — DILTIAZEM HYDROCHLORIDE 30 MG: 30 TABLET, FILM COATED ORAL at 15:34

## 2019-12-05 RX ADMIN — ARFORMOTEROL TARTRATE 15 MCG: 15 SOLUTION RESPIRATORY (INHALATION) at 09:45

## 2019-12-05 RX ADMIN — LISINOPRIL 10 MG: 10 TABLET ORAL at 10:02

## 2019-12-05 RX ADMIN — FUROSEMIDE 20 MG: 20 TABLET ORAL at 10:03

## 2019-12-05 RX ADMIN — METOPROLOL TARTRATE 50 MG: 50 TABLET, FILM COATED ORAL at 18:19

## 2019-12-05 RX ADMIN — BUDESONIDE 500 MCG: 0.5 INHALANT RESPIRATORY (INHALATION) at 09:45

## 2019-12-05 RX ADMIN — WARFARIN SODIUM 3 MG: 2 TABLET ORAL at 18:23

## 2019-12-05 RX ADMIN — Medication 10 ML: at 07:31

## 2019-12-05 RX ADMIN — VANCOMYCIN HYDROCHLORIDE 1250 MG: 10 INJECTION, POWDER, LYOPHILIZED, FOR SOLUTION INTRAVENOUS at 13:22

## 2019-12-05 RX ADMIN — ATORVASTATIN CALCIUM 40 MG: 40 TABLET, FILM COATED ORAL at 10:03

## 2019-12-05 RX ADMIN — Medication 10 ML: at 22:08

## 2019-12-05 RX ADMIN — Medication 1 CAPSULE: at 10:03

## 2019-12-05 RX ADMIN — AMLODIPINE BESYLATE 5 MG: 5 TABLET ORAL at 10:03

## 2019-12-05 RX ADMIN — DILTIAZEM HYDROCHLORIDE 30 MG: 30 TABLET, FILM COATED ORAL at 12:00

## 2019-12-05 RX ADMIN — ASPIRIN 81 MG: 81 TABLET, COATED ORAL at 10:03

## 2019-12-05 RX ADMIN — CEFEPIME HYDROCHLORIDE 2 G: 2 INJECTION, POWDER, FOR SOLUTION INTRAVENOUS at 04:02

## 2019-12-05 RX ADMIN — TRAMADOL HYDROCHLORIDE 50 MG: 50 TABLET, FILM COATED ORAL at 01:56

## 2019-12-05 RX ADMIN — Medication 100 MG: at 10:03

## 2019-12-05 RX ADMIN — ARFORMOTEROL TARTRATE 15 MCG: 15 SOLUTION RESPIRATORY (INHALATION) at 21:00

## 2019-12-05 RX ADMIN — CEFEPIME HYDROCHLORIDE 2 G: 2 INJECTION, POWDER, FOR SOLUTION INTRAVENOUS at 15:34

## 2019-12-05 NOTE — PROGRESS NOTES
Pharmacist Note  Warfarin Dosing  Consult provided for this 84 y.o.male to manage warfarin for Atrial Fibrillation    INR Goal: 2 - 3    Home regimen/ tablet size: 4 mg daily    Drugs that may increase INR: None  Drugs that may decrease INR: None  Other current anticoagulants/ drugs that may increase bleeding risk: Aspirin  Risk factors: Age > 65  Daily INR ordered: YES    Recent Labs     12/05/19  0400 12/04/19  0352 12/03/19  0401   HGB 13.2 13.8 14.2   INR 2.9* 3.6* 4.3*     Date               INR                  Dose  11/27  2.7  4 mg (took PTA)   11/28  2.6  4 mg   11/29  3  2.5 mg   11/30               2.7                  3 mg  12/01               4.2                  Held  12/02  4.4  Held  -- Consult discontinued - Reconsulted 12/04 --  12/04  3.6  Hold   12/05  2.9  3 mg                                                                               Assessment/ Plan:  INR back in therapeutic goal range. Will give a boost dose of 3 mg x 1 to attenuate INR decrease. Will give a reduced dose tomorrow. Pharmacy will continue to monitor daily and adjust therapy as indicated. Please contact the pharmacist at  for outpatient recommendations if needed.      Virginia Dunham, PharmD  Clinical Pharmacist, Orthopedics and Med/Surg () [id number] [address] [phone]

## 2019-12-05 NOTE — PROGRESS NOTES
[facility]                                                                                          Hospitalist Progress Note  Charlotte Kuo MD  Answering service: [phone] OR [phone] from in house phone        Date of Service:  2019  NAME:  Nelson Brooks  :  1935  MRN:  [id number]      Admission Summary:       CC: altered mental state        HPI: 81 y.o man w/ afib, CVA, HTN, COPD, probable dementia, nursing home resident, who presents with AMS. He was reportedly more confused than baseline during the day. He was seen by [facility] and was diagnosed with a UTI based on a urine dipstick and cellulitis of the right arm. He was sent to the ED due to the increase in confusion. His son describes that he is confused at baseline and the severity is variable, and that he is back at baseline currently. He has not been formally diagnosed with dementia. No known fever or pain. The patient denies any complaints but he is a poor historian.       Interval history / Subjective:       Patient is seen and examined at bedside. He is awake and alert but not oriented. Still has right arm erythema and swelling. Son present. Updated patient's status. Discussed with nursing     Assessment & Plan:     R arm cellulitis with R elbow effusion: improving   - s/p arthrocentesis .   - XR:  Nonspecific joint effusion. No evidence of fracture or osteomyelitis  - Ortho on board: may consider MRI if needed   - blood cx negative. Fluid cx so far negative  -mild leucocytosis 11.9  - venous duplex negative for DVT   - appreciate ID input  - abx broadened to IV vanco and cefepime  - needs arm elevated     Acute metabolic encephalopathy - improving  Underlying dementia   - CT head: No evidence of acute infarct or intracranial hemorrhage. Periventricular white matter disease is likely secondary to chronic small vessel ischemic changes. Chronic left posterior parietal infarct.   - Acute agitation on 11/28 pm. Haldol x 1 dose given   - supportive care    Chronic atrial fibrillation: rate controlled on diltiazem, metoprolol. Supra therapeutic INR - coumadin on hold. INR 2.9  today. Pharmacy to dose     COPD w/ chronic hypoxic respiratory failure: stable  -on 2l NC O2  -inhaled bronchodilators    UTI ruled out   - this is based on an outpatient urine dipstick that showed +++ leukocyte esterase and + nitrite. - urine cx 11/27: negative     Hypokalemia - replaced     HTN: Bp stable. continue lisinopril, diltiazem, metoprolol , amlodipine , lasix    History of alcohol abuse: remote, resolved per son  History of CVA: continue aspirin, statin       DVT ppx: anticoagulated  Code status: DNR - son states he has a DDNR at home  Care Plan discussed with: Patient/Family  Disposition: TBD. PT/OT eval      Hospital Problems  Never Reviewed          Codes Class Noted POA    Cellulitis of right arm ICD-10-CM: L03.113  ICD-9-CM: 682.3  11/27/2019 Unknown                Review of Systems:   Review of systems not obtained due to patient factors. Vital Signs:    Last 24hrs VS reviewed since prior progress note. Most recent are:  Visit Vitals  /82 (BP 1 Location: Left arm, BP Patient Position: At rest)   Pulse 89   Temp 98.7 °F (37.1 °C)   Resp 20   Ht 5' 10\" (1.778 m)   Wt 81.6 kg (179 lb 14.3 oz)   SpO2 95%   BMI 25.81 kg/m²         Intake/Output Summary (Last 24 hours) at 12/5/2019 1437  Last data filed at 12/4/2019 2051  Gross per 24 hour   Intake 600 ml   Output    Net 600 ml        Physical Examination:             Constitutional:  No acute distress     ENT:  Oral mucous moist, oropharynx benign. Resp:  No wheezing/rhonchi/rales. No accessory muscle use   CV:  Regular rhythm, normal rate, no murmurs, gallops, rubs    GI:  Soft, non distended, non tender.  normoactive bowel sounds, no hepatosplenomegaly     Musculoskeletal:  No edema, warm, 2+ pulses throughout    Neurologic:  Moves all extremities. Demented      Psych: not agitated not anxious   Skin:  rt arm , edematous and erythematous        Data Review:    Review and/or order of clinical lab test      Labs:     Recent Labs     12/05/19 0400 12/04/19 0352   WBC 11.9* 12.5*   HGB 13.2 13.8   HCT 43.9 45.3    290     Recent Labs     12/05/19 0400 12/04/19 0352 12/03/19 0401    144 144   K 3.5 3.3* 3.5    103 108   CO2 39* 36* 31   BUN 13 13 13   CREA 0.79 0.90 0.83   * 120* 95   CA 9.8 9.6 9.2     No results for input(s): SGOT, GPT, ALT, AP, TBIL, TBILI, TP, ALB, GLOB, GGT, AML, LPSE in the last 72 hours. No lab exists for component: AMYP, HLPSE  Recent Labs     12/05/19 0400 12/04/19 0352 12/03/19 0401   INR 2.9* 3.6* 4.3*   PTP 27.4* 34.5* 40.3*      No results for input(s): FE, TIBC, PSAT, FERR in the last 72 hours. No results found for: FOL, RBCF   No results for input(s): PH, PCO2, PO2 in the last 72 hours. No results for input(s): CPK, CKNDX, TROIQ in the last 72 hours.     No lab exists for component: CPKMB  No results found for: CHOL, CHOLX, CHLST, CHOLV, HDL, HDLP, LDL, LDLC, DLDLP, TGLX, TRIGL, TRIGP, CHHD, CHHDX  No results found for: [facility]  Lab Results   Component Value Date/Time    Color YELLOW/STRAW 11/27/2019 09:08 PM    Appearance CLOUDY (A) 11/27/2019 09:08 PM    Specific gravity 1.020 11/27/2019 09:08 PM    pH (UA) 6.0 11/27/2019 09:08 PM    Protein 100 (A) 11/27/2019 09:08 PM    Glucose NEGATIVE  11/27/2019 09:08 PM    Ketone TRACE (A) 11/27/2019 09:08 PM    Bilirubin NEGATIVE  11/27/2019 09:08 PM    Urobilinogen 1.0 11/27/2019 09:08 PM    Nitrites NEGATIVE  11/27/2019 09:08 PM    Leukocyte Esterase LARGE (A) 11/27/2019 09:08 PM    Epithelial cells FEW 11/27/2019 09:08 PM    Bacteria NEGATIVE  11/27/2019 09:08 PM    WBC >100 (H) 11/27/2019 09:08 PM    RBC >100 (H) 11/27/2019 09:08 PM         Medications Reviewed:     Current Facility-Administered Medications   Medication Dose Route Frequency    warfarin (COUMADIN) tablet 3 mg  3 mg Oral ONCE    influenza vaccine 2019-20 (6 mos+)(PF) (FLUARIX/FLULAVAL/FLUZONE QUAD) injection 0.5 mL  0.5 mL IntraMUSCular PRIOR TO DISCHARGE    Warfarin - Pharmacy to Dose   Other Rx Dosing/Monitoring    cefepime (MAXIPIME) 2 g in 0.9% sodium chloride (MBP/ADV) 100 mL  2 g IntraVENous Q12H    Vancomycin - Pharmacy to Dose   Other Rx Dosing/Monitoring    vancomycin (VANCOCIN) 1250 mg in  ml infusion  1,250 mg IntraVENous Q18H    albuterol-ipratropium (DUO-NEB) 2.5 MG-0.5 MG/3 ML  3 mL Nebulization Q6H PRN    lactobac ac& pc-s.therm-b.anim (HELADIO Q/RISAQUAD)  1 Cap Oral DAILY    arformoterol (BROVANA) neb solution 15 mcg  15 mcg Nebulization BID RT    And    budesonide (PULMICORT) 500 mcg/2 ml nebulizer suspension  500 mcg Nebulization BID RT    traMADol (ULTRAM) tablet 50 mg  50 mg Oral Q6H PRN    fentaNYL citrate (PF) injection 25 mcg  25 mcg IntraVENous Q4H PRN    hydrALAZINE (APRESOLINE) 20 mg/mL injection 20 mg  20 mg IntraVENous Q6H PRN    lisinopril (PRINIVIL, ZESTRIL) tablet 10 mg  10 mg Oral DAILY    amLODIPine (NORVASC) tablet 5 mg  5 mg Oral DAILY    sodium chloride (NS) flush 5-40 mL  5-40 mL IntraVENous Q8H    sodium chloride (NS) flush 5-40 mL  5-40 mL IntraVENous PRN    acetaminophen (TYLENOL) tablet 650 mg  650 mg Oral Q4H PRN    ondansetron (ZOFRAN) injection 4 mg  4 mg IntraVENous Q4H PRN    aspirin delayed-release tablet 81 mg  81 mg Oral DAILY    atorvastatin (LIPITOR) tablet 40 mg  40 mg Oral DAILY    dilTIAZem (CARDIZEM) IR tablet 30 mg  30 mg Oral TIDAC    furosemide (LASIX) tablet 20 mg  20 mg Oral DAILY    metoprolol tartrate (LOPRESSOR) tablet 50 mg  50 mg Oral BID    thiamine HCL (B-1) tablet 100 mg  100 mg Oral DAILY     ______________________________________________________________________  EXPECTED LENGTH OF STAY: - - -  ACTUAL LENGTH OF STAY:          8                 Shameka Mcgregor MD

## 2019-12-05 NOTE — PROGRESS NOTES
Problem: Dysphagia (Adult)  Goal: *Acute Goals and Plan of Care (Insert Text)  Description  Speech Therapy Goals  Initiated 11/29/2019  1. Patient will tolerate medication crushed in puree without overt s/s aspiration within 7 days. MET  2. Patient will participate in re-evaluation of swallow function within 7 days. MET  Initiated 12/2/2019  1. Patient will tolerate regular diet, thin liquids with careful feeding free of s/s of aspiration within 7 days   Outcome: Resolved/Met   [id number] [facility]  Patient: Jennifer Ny (89 y.o. male)  Date: 12/5/2019  Diagnosis: Cellulitis of right arm [L03.113]   <principal problem not specified>       Precautions:   Fall, Skin    ASSESSMENT:  Patient continues with mild oropharyngeal dysphagia; however, his greatest aspiration risk factors are his mentation and dependence for feeding. Neither of these factors appear to be changing in the near future. He is tolerating a regular diet with thin liquids with careful feeding when awake and alert. PLAN:  Recommendations and Planned Interventions:  Regular diet thins  Feed only when awake and alert and actively accepting  Small bites and sips  Patient continues to benefit from skilled intervention to address the above impairments. Continue treatment per established plan of care. Discharge Recommendations:  None     SUBJECTIVE:   Patient stated Oh yeah, good! .     OBJECTIVE:   Cognitive and Communication Status:  Neurologic State: Confused, Alert  Orientation Level: Oriented to person  Cognition: Follows commands  Perception: Appears intact  Perseveration: No perseveration noted  Safety/Judgement: Decreased awareness of need for safety, Decreased insight into deficits, Decreased awareness of need for assistance, Decreased awareness of environment  Dysphagia Treatment:  Oral Assessment:  Oral Assessment  Dentition: Edentulous  Oral Hygiene: moist, clean  Lingual: No impairment  Mandible: No impairment  P.O. Trials:  Patient Position: up in bed  Vocal quality prior to P.O.: No impairment  Consistency Presented: Thin liquid;Puree;Mechanical soft  How Presented: SLP-fed/presented;Cup/sip;Spoon     Bolus Acceptance: No impairment  Bolus Formation/Control: Impaired  Type of Impairment: Mastication  Propulsion: Delayed (# of seconds)  Oral Residue: None  Initiation of Swallow: Delayed (# of seconds)  Laryngeal Elevation: Functional  Aspiration Signs/Symptoms: None                                                                                                                                           Pain:  Pain Scale 1: Visual  Pain Intensity 1: 0       After treatment:   Patient left in no apparent distress in bed and Call bell within reach    COMMUNICATION/EDUCATION:   Patient was educated regarding purpose of SLP visit. His mentation is a barrier to further education. No family present. The patient's plan of care including recommendations, planned interventions, and recommended diet changes were discussed with: Registered Nurse. Not applicable: Posted safety precautions in patient's room.     MURPHY Lewis  Time Calculation: 18 mins

## 2019-12-05 NOTE — PROGRESS NOTES
Problem: Pressure Injury - Risk of  Goal: *Prevention of pressure injury  Description  Document Oscar Scale and appropriate interventions in the flowsheet. Outcome: Progressing Towards Goal  Note: Pressure Injury Interventions:       Moisture Interventions: Check for incontinence Q2 hours and as needed    Activity Interventions: Increase time out of bed, PT/OT evaluation    Mobility Interventions: HOB 30 degrees or less, Float heels    Nutrition Interventions: Document food/fluid/supplement intake    Friction and Shear Interventions: HOB 30 degrees or less, Lift sheet                Problem: Falls - Risk of  Goal: *Absence of Falls  Description  Document Vinec Fall Risk and appropriate interventions in the flowsheet.   Outcome: Progressing Towards Goal  Note: Fall Risk Interventions:  Mobility Interventions: Communicate number of staff needed for ambulation/transfer    Mentation Interventions: Door open when patient unattended    Medication Interventions: Teach patient to arise slowly, Patient to call before getting OOB    Elimination Interventions: Call light in reach    History of Falls Interventions: Door open when patient unattended

## 2019-12-05 NOTE — PROGRESS NOTES
Infectious Disease Progress Note          HPI:    Mr Poncho Quinones seen earlier today  Sleeping   Cannot get history from him   D/W with his primary team today     Medications:    Current Facility-Administered Medications:     warfarin (COUMADIN) tablet 3 mg, 3 mg, Oral, ONCE, Madala, Sushma, MD    influenza vaccine 2019-20 (6 mos+)(PF) (FLUARIX/FLULAVAL/FLUZONE QUAD) injection 0.5 mL, 0.5 mL, IntraMUSCular, PRIOR TO DISCHARGE, Madala, Sushma, MD    Warfarin - Pharmacy to Dose, , Other, Rx Dosing/Monitoring, Nasir Diaz MD    cefepime (MAXIPIME) 2 g in 0.9% sodium chloride (MBP/ADV) 100 mL, 2 g, IntraVENous, Q12H, Yokasta Drummond DO, Last Rate: 200 mL/hr at 12/05/19 0402, 2 g at 12/05/19 0402    Vancomycin - Pharmacy to Dose, , Other, Rx Dosing/Monitoring, Yokasta Drummond DO    [COMPLETED] vancomycin (VANCOCIN) 2000 mg in  ml infusion, 2,000 mg, IntraVENous, ONCE, Last Rate: 250 mL/hr at 12/04/19 1732, 2,000 mg at 12/04/19 1732 **FOLLOWED BY** vancomycin (VANCOCIN) 1250 mg in  ml infusion, 1,250 mg, IntraVENous, Q18H, Yokasta Drummond DO    albuterol-ipratropium (DUO-NEB) 2.5 MG-0.5 MG/3 ML, 3 mL, Nebulization, Q6H PRN, Reed Wu MD    lactobac ac& pc-s.therm-b.anim (HELADIO Q/RISAQUAD), 1 Cap, Oral, DAILY, Reed Wu MD, 1 Cap at 12/05/19 1003    arformoterol (BROVANA) neb solution 15 mcg, 15 mcg, Nebulization, BID RT, 15 mcg at 12/05/19 0945 **AND** budesonide (PULMICORT) 500 mcg/2 ml nebulizer suspension, 500 mcg, Nebulization, BID RT, Reed Wu MD, 500 mcg at 12/05/19 0945    traMADol (ULTRAM) tablet 50 mg, 50 mg, Oral, Q6H PRN, Rustam Chun MD, 50 mg at 12/05/19 0156    fentaNYL citrate (PF) injection 25 mcg, 25 mcg, IntraVENous, Q4H PRN, Rustam Chun MD, 25 mcg at 11/28/19 0143    hydrALAZINE (APRESOLINE) 20 mg/mL injection 20 mg, 20 mg, IntraVENous, Q6H PRN, Reed Wu MD    lisinopril (PRINIVIL, ZESTRIL) tablet 10 mg, 10 mg, Oral, DAILY, Braxton Edwards MD NADIRA, 10 mg at 12/05/19 1002    amLODIPine (NORVASC) tablet 5 mg, 5 mg, Oral, DAILY, Andres Higuera MD, 5 mg at 12/05/19 1003    sodium chloride (NS) flush 5-40 mL, 5-40 mL, IntraVENous, Q8H, Marixa Salmon MD, 10 mL at 12/05/19 0731    sodium chloride (NS) flush 5-40 mL, 5-40 mL, IntraVENous, PRN, Marixa Salmon MD    acetaminophen (TYLENOL) tablet 650 mg, 650 mg, Oral, Q4H PRN, Elier Valentino MD, 650 mg at 11/27/19 2150    ondansetron (ZOFRAN) injection 4 mg, 4 mg, IntraVENous, Q4H PRN, Marixa Salmon MD    aspirin delayed-release tablet 81 mg, 81 mg, Oral, DAILY, Marixa Salmon MD, 81 mg at 12/05/19 1003    atorvastatin (LIPITOR) tablet 40 mg, 40 mg, Oral, DAILY, Marixa Salmon MD, 40 mg at 12/05/19 1003    dilTIAZem (CARDIZEM) IR tablet 30 mg, 30 mg, Oral, TIDAC, Marixa Salmon MD, Stopped at 12/05/19 7936    furosemide (LASIX) tablet 20 mg, 20 mg, Oral, DAILY, Marixa Salmon MD, 20 mg at 12/05/19 1003    metoprolol tartrate (LOPRESSOR) tablet 50 mg, 50 mg, Oral, BID, Marixa Salmon MD, 50 mg at 12/05/19 1002    thiamine HCL (B-1) tablet 100 mg, 100 mg, Oral, DAILY, Marixa Salmon MD, 100 mg at 12/05/19 1003          Physical Exam:    Vitals:   Patient Vitals for the past 24 hrs:   Temp Pulse Resp BP SpO2   12/05/19 1001 98.7 °F (37.1 °C) 89 20 155/82 95 %   12/05/19 0945     94 %   12/05/19 0244 98.5 °F (36.9 °C) 95 22 151/78 90 %   12/04/19 2011 98 °F (36.7 °C) 84 22 146/84 95 %   12/04/19 1733  90  152/70    12/04/19 1424 96.5 °F (35.8 °C) 91 22 150/88 92 %     · GEN: NAD  · HEENT:  Mouth open, no thrush   · CV: S1, S2 heard regularly,  · Lungs: Clear to auscultation bilaterally anteriorly  · Abdomen: soft, non distended, nonfacial grimace to palpation   · Extremities: no edema  lower extremities  ·  skin: no rash  Musculoskeletal: No facial grimace to palpation of the right elbow, + edema or RUE , + erythema and warmth, but erythema looks slightly better than yesterday to my eye   Labs:   Recent Results (from the past 24 hour(s))   PROTHROMBIN TIME + INR    Collection Time: 12/05/19  4:00 AM   Result Value Ref Range    INR 2.9 (H) 0.9 - 1.1      Prothrombin time 27.4 (H) 9.0 - 30.2 sec   METABOLIC PANEL, BASIC    Collection Time: 12/05/19  4:00 AM   Result Value Ref Range    Sodium 145 136 - 145 mmol/L    Potassium 3.5 3.5 - 5.1 mmol/L    Chloride 104 97 - 108 mmol/L    CO2 39 (H) 21 - 32 mmol/L    Anion gap 2 (L) 5 - 15 mmol/L    Glucose 116 (H) 65 - 100 mg/dL    BUN 13 6 - 20 MG/DL    Creatinine 0.79 0.70 - 1.30 MG/DL    BUN/Creatinine ratio 16 12 - 20      GFR est AA >60 >60 ml/min/1.73m2    GFR est non-AA >60 >60 ml/min/1.73m2    Calcium 9.8 8.5 - 10.1 MG/DL   CBC WITH AUTOMATED DIFF    Collection Time: 12/05/19  4:00 AM   Result Value Ref Range    WBC 11.9 (H) 4.1 - 11.1 K/uL    RBC 4.28 4.10 - 5.70 M/uL    HGB 13.2 12.1 - 17.0 g/dL    HCT 43.9 36.6 - 50.3 %    .6 (H) 80.0 - 99.0 FL    MCH 30.8 26.0 - 34.0 PG    MCHC 30.1 30.0 - 36.5 g/dL    RDW 13.0 11.5 - 14.5 %    PLATELET 775 754 - 733 K/uL    MPV 9.5 8.9 - 12.9 FL    NRBC 0.0 0  WBC    ABSOLUTE NRBC 0.00 0.00 - 0.01 K/uL    NEUTROPHILS 84 (H) 32 - 75 %    LYMPHOCYTES 7 (L) 12 - 49 %    MONOCYTES 7 5 - 13 %    EOSINOPHILS 1 0 - 7 %    BASOPHILS 0 0 - 1 %    IMMATURE GRANULOCYTES 1 (H) 0.0 - 0.5 %    ABS. NEUTROPHILS 10.0 (H) 1.8 - 8.0 K/UL    ABS. LYMPHOCYTES 0.9 0.8 - 3.5 K/UL    ABS. MONOCYTES 0.8 0.0 - 1.0 K/UL    ABS. EOSINOPHILS 0.2 0.0 - 0.4 K/UL    ABS. BASOPHILS 0.0 0.0 - 0.1 K/UL    ABS. IMM.  GRANS. 0.1 (H) 0.00 - 0.04 K/UL    DF AUTOMATED         Microbiology Data:       Blood: 11/27/19      Component Value Ref Range & Units Status   Special Requests: NO SPECIAL REQUESTS    Final   Culture result: NO GROWTH 5 DAYS    Final   Result History               Synovial/Joint fluid: 11/27/19      Urine 11/27/19  Component Value Ref Range & Units Status   Special Requests: NO SPECIAL REQUESTS    Final   [place] Count <10,000   COLONIES/mL    Final   Culture result: NO SIGNIFICANT GROWTH    Final   Result History   Specimen Information: Joint Fluid        Component Value Ref Range & Units Status   Special Requests: NO SPECIAL REQUESTS    Preliminary   GRAM STAIN 1+ WBCS SEEN    Preliminary   GRAM STAIN NO ORGANISMS SEEN    Preliminary   Culture result:    Preliminary   Culture performed on Unspun Fluid    Culture result:    Preliminary   No growth thus far, holding 14 days. Result History             Imaging:   CT Head 11/27/19  IMPRESSION  Impression:   1. No evidence of acute infarct or intracranial hemorrhage. 2. Periventricular white matter disease is likely secondary to chronic small  vessel ischemic changes. 3. Chronic left posterior parietal infarct. CXR 11/27/19   FINDINGS: PA and lateral views of the chest demonstrate a stable  cardiomediastinal silhouette and no focal airspace disease. There are small  bilateral pleural effusions. . The visualized osseous structures are  unremarkable.     IMPRESSION  IMPRESSION: Small bilateral pleural effusions.     Xray R elbow  11/27/19   FINDINGS: Three views of the right elbow demonstrate antecubital intravenous  access. Elbow joint effusion is moderate. Bones are osteopenic. No acute  fracture or evidence of osteomyelitis. Mild ulnotrochlear osteoarthritis.     IMPRESSION  IMPRESSION:      Nonspecific joint effusion. No evidence of fracture or osteomyelitis. Right Upper Venous  12/4/19    No evidence of acute DVT and chronic DVT. The internal jugular, subclavian, axillary, brachial prox, brachial mid, brachial dist, cephalic upper arm and basilic upper arm vein(s) were visualized in the transverse and longitudinal views. The vessels showed normal color filling and compressibility. Doppler interrogation showed phasic and spontaneous flow.          Procedures:   11/27/19  R elbow US guided aspiration : aspirated 3 cc cloudy fluid Assessment / Plan:     Mr. Joe Art is an 71-year-old gentleman with hypertension, atrial fibrillation, COPD per the chart  admitted on 11/27/2019 with change in mental status and found to have right upper extremity cellulitis. He status post aspiration of the right elbow with cultures that are negative. No crystals seen in the fluid . Blood cultures negative . He continues to have cellulitis of the right upper extremity and edema. 1) right upper extremity cellulitis and edema  Unclear of any insect or animal bite, no clear puncture wound  except one spot that is likely where he had ultrasound-guided aspiration   Synovial fluid with 1+ WBCs and no growth on culture  Synovial fluid with 19,880 nucleated cells , 98% neutrophils   He has been on vancomycin which has been stopped  Currently on ceftriaxone    Recommendations  Vancomycin IV and cefepime with close monitoring   Upper extremity elevation recommended which is very challenging in this patient. D/W wt primary team about propping multiple pillows and or sling to keep his arm elevated   There is a lot of dependent edema on exam   Will need to monitor renal function very closely given age concern for nephrotoxicity with antibiotic use  High risk for C. Difficile and other antibiotic associated adverse effects   Probiotics daily  Vancomycin trough goal 10-15  Monitor for diarrhea      2) atrial fibrillation per chart  Noted on warfarin  Antibiotics can interfere with anticoagulants and need close INR monitoring per primary team's and pharmacy    3) hypertension     4) COPD per chart      5) DVT prophylaxis      Plan discussed with Dr Caleb Laurent today     Thank for the opportunity to participate in the care of this patient. Please contact with questions or concerns.        Cat العلي DO  10:42 AM

## 2019-12-05 NOTE — PROGRESS NOTES
Orthopedic & Surgical Nursing Communication Tool        Bedside and Verbal shift change report given to Khanh Parker, RN (incoming nurse) by Edi Resendiz RN (outgoing nurse) on Reyna Paget a 81 y.o. male and born 1935 who arrived at the hospital on 11/27/2019  2:37 PM. Report included the following information SBAR, Kardex and MAR. Significant changes during shift: Confused, attempting to get out of bed, needing frequent re-orientation.       Issues for physician to address: none          Pain Controlled          [x] yes          [] no    Bowel Movement          [] yes          [x] no          Code Status: DNR     Admit Diagnosis: Cellulitis of right arm [I98.334]     Surgery: * No surgery found *     Infections: No current active infections     Allergies: Seroquel [quetiapine]     Current diet: DIET CARDIAC Regular  DIET ONE TIME MESSAGE  DIET ONE TIME MESSAGE           Vital Signs:     Patient Vitals for the past 12 hrs:   Temp Pulse Resp BP SpO2   12/05/19 0244 98.5 °F (36.9 °C) 95 22 151/78 90 %   12/04/19 2011 98 °F (36.7 °C) 84 22 146/84 95 %      Intake & Output:       Intake/Output Summary (Last 24 hours) at 12/5/2019 0809  Last data filed at 12/4/2019 2051  Gross per 24 hour   Intake 600 ml   Output    Net 600 ml      Laboratory Results:     Recent Results (from the past 12 hour(s))   PROTHROMBIN TIME + INR    Collection Time: 12/05/19  4:00 AM   Result Value Ref Range    INR 2.9 (H) 0.9 - 1.1      Prothrombin time 27.4 (H) 9.0 - 60.7 sec   METABOLIC PANEL, BASIC    Collection Time: 12/05/19  4:00 AM   Result Value Ref Range    Sodium 145 136 - 145 mmol/L    Potassium 3.5 3.5 - 5.1 mmol/L    Chloride 104 97 - 108 mmol/L    CO2 39 (H) 21 - 32 mmol/L    Anion gap 2 (L) 5 - 15 mmol/L    Glucose 116 (H) 65 - 100 mg/dL    BUN 13 6 - 20 MG/DL    Creatinine 0.79 0.70 - 1.30 MG/DL    BUN/Creatinine ratio 16 12 - 20      GFR est AA >60 >60 ml/min/1.73m2    GFR est non-AA >60 >60 ml/min/1.73m2    Calcium 9.8 8.5 - 10.1 MG/DL   CBC WITH AUTOMATED DIFF    Collection Time: 12/05/19  4:00 AM   Result Value Ref Range    WBC 11.9 (H) 4.1 - 11.1 K/uL    RBC 4.28 4.10 - 5.70 M/uL    HGB 13.2 12.1 - 17.0 g/dL    HCT 43.9 36.6 - 50.3 %    .6 (H) 80.0 - 99.0 FL    MCH 30.8 26.0 - 34.0 PG    MCHC 30.1 30.0 - 36.5 g/dL    RDW 13.0 11.5 - 14.5 %    PLATELET 360 059 - 241 K/uL    MPV 9.5 8.9 - 12.9 FL    NRBC 0.0 0  WBC    ABSOLUTE NRBC 0.00 0.00 - 0.01 K/uL    NEUTROPHILS 84 (H) 32 - 75 %    LYMPHOCYTES 7 (L) 12 - 49 %    MONOCYTES 7 5 - 13 %    EOSINOPHILS 1 0 - 7 %    BASOPHILS 0 0 - 1 %    IMMATURE GRANULOCYTES 1 (H) 0.0 - 0.5 %    ABS. NEUTROPHILS 10.0 (H) 1.8 - 8.0 K/UL    ABS. LYMPHOCYTES 0.9 0.8 - 3.5 K/UL    ABS. MONOCYTES 0.8 0.0 - 1.0 K/UL    ABS. EOSINOPHILS 0.2 0.0 - 0.4 K/UL    ABS. BASOPHILS 0.0 0.0 - 0.1 K/UL    ABS. IMM. GRANS. 0.1 (H) 0.00 - 0.04 K/UL    DF AUTOMATED              Opportunity for questions and clarifications were given to the incoming nurse. Patient's bed locked and is in low position, side rails up x2, door open PRN, call bell within reach of patient and patient not in distress.       Signed by: Laura Francois RN, BSN, [address] [place] [address] Drive                       12/5/2019 at 8:09 AM

## 2019-12-05 NOTE — PROGRESS NOTES
Orthopedics Spine Incoming Shift Nursing Note        INCOMING SHIFT REPORT:    Verbal and/or Written report received from Kory Gupta RN by Daisy Galindo RN on Nelson Brooks a 81 y.o. male who was admitted on 11/27/2019  2:37 PM and currently admitted to unit. Code Status: DNR     Admit Diagnosis: Cellulitis of right arm [A14.644]     Surgery: * No surgery found *     Infections: No current active infections     Allergies: Seroquel [quetiapine]     Current diet: DIET CARDIAC Regular  DIET ONE TIME MESSAGE  DIET ONE TIME MESSAGE     Lines:   Peripheral IV 11/30/19 Anterior; Left Forearm (Active)   Site Assessment Clean, dry, & intact 12/4/2019  8:06 AM   Phlebitis Assessment 0 12/4/2019  8:06 AM   Infiltration Assessment 0 12/4/2019  8:06 AM   Dressing Status Clean, dry, & intact 12/4/2019  8:06 AM   Dressing Type Transparent 12/4/2019  8:06 AM   Hub Color/Line Status End cap changed; Flushed;Patent 12/4/2019  8:06 AM   Action Taken Open ports on tubing capped 12/4/2019  8:06 AM   Alcohol Cap Used Yes 12/4/2019  8:06 AM          Report consisted of the following information SBAR, Kardex and MAR and the information was reviewed with the reporting nurse. Opportunity for questions and clarifications were provided. Patient's bed locked and in low position, side rails up x 2, door open PRN, call bell within patient's reach and patient is not in distress. A complete nursing assessment will be/has been  completed by the receiving nurse.       Daisy Galindo RN, BSN, [facility]    12/4/2019 at 8:21 PM

## 2019-12-05 NOTE — PROGRESS NOTES
ORTHO PROGRESS NOTE      SUBJECTIVE:  Sofiya Aguilar is awake but unable to answer questions. No family present. OBJECTIVE:  Patient Vitals for the past 24 hrs:   Temp Pulse BP   12/05/19 1001 98.7 °F (37.1 °C) 89 155/82   12/05/19 0244 98.5 °F (36.9 °C) 95 151/78   12/04/19 2011 98 °F (36.7 °C) 84 146/84   12/04/19 1733  90 152/70   12/04/19 1424 96.5 °F (35.8 °C) 91 150/88       Awake, no distress. Lying in bed. RUE still erythema and pitting edema from elbow to hand. Shoulder looks OK. Elbow PROM 45-90. Recent Labs     12/05/19  0400   HGB 13.2   HCT 43.9      INR 2.9*   BUN 13   CREA 0.79   GFRAA >60   GFRNA >60     WBC 11.9. ASSESSMENT:  RUE cellulitis. Elbow joint aspirate NGTD. PLAN:  Abx per ID. No current plans for further ortho intervention. Elevate RUE as possible. I updated Dr. Domingo Cardona who is aware.     MACKENZIE Morelos  Orthopedic Trauma Service  [address]

## 2019-12-05 NOTE — PROGRESS NOTES
BELLA:  1. [facility] SNF referral pending. 2. CM to start authorization. Cm met with patient son at bedside, he provided cm with SNF choice and would like referral sent to [facility]. The following information was submitted to [facility] for initial authorization:  Face Sheet/Demographics    (Include: Usual living situation)  History and Physical  Last 24 hours of MD and RN notes   (Include: Current Level of Functioning; cognitive status)  PT/OT/ST Evaluations and/or notes within the last 48 hours  MAR  MD orders  (Include: Attending or ordering MDs name and phone #; skilled nursing needs;    Wound care/etc)  Projected Date of Transfer to SNF  Requested SNF  SNF Point of Contact and Phone #  CM Point of Contact and Phone #  NPI # for SNF      [facility]

## 2019-12-06 LAB
ANION GAP SERPL CALC-SCNC: 2 MMOL/L (ref 5–15)
BASOPHILS # BLD: 0.1 K/UL (ref 0–0.1)
BASOPHILS NFR BLD: 1 % (ref 0–1)
BUN SERPL-MCNC: 14 MG/DL (ref 6–20)
BUN/CREAT SERPL: 17 (ref 12–20)
CALCIUM SERPL-MCNC: 9.7 MG/DL (ref 8.5–10.1)
CHLORIDE SERPL-SCNC: 101 MMOL/L (ref 97–108)
CO2 SERPL-SCNC: 39 MMOL/L (ref 21–32)
CREAT SERPL-MCNC: 0.84 MG/DL (ref 0.7–1.3)
DIFFERENTIAL METHOD BLD: ABNORMAL
EOSINOPHIL # BLD: 0.1 K/UL (ref 0–0.4)
EOSINOPHIL NFR BLD: 1 % (ref 0–7)
ERYTHROCYTE [DISTWIDTH] IN BLOOD BY AUTOMATED COUNT: 12.9 % (ref 11.5–14.5)
GLUCOSE SERPL-MCNC: 104 MG/DL (ref 65–100)
HCT VFR BLD AUTO: 44.9 % (ref 36.6–50.3)
HGB BLD-MCNC: 13.4 G/DL (ref 12.1–17)
IMM GRANULOCYTES # BLD AUTO: 0.1 K/UL (ref 0–0.04)
IMM GRANULOCYTES NFR BLD AUTO: 1 % (ref 0–0.5)
INR PPP: 2.3 (ref 0.9–1.1)
LYMPHOCYTES # BLD: 0.8 K/UL (ref 0.8–3.5)
LYMPHOCYTES NFR BLD: 6 % (ref 12–49)
MCH RBC QN AUTO: 30.6 PG (ref 26–34)
MCHC RBC AUTO-ENTMCNC: 29.8 G/DL (ref 30–36.5)
MCV RBC AUTO: 102.5 FL (ref 80–99)
MONOCYTES # BLD: 0.9 K/UL (ref 0–1)
MONOCYTES NFR BLD: 6 % (ref 5–13)
NEUTS SEG # BLD: 12.3 K/UL (ref 1.8–8)
NEUTS SEG NFR BLD: 85 % (ref 32–75)
NRBC # BLD: 0 K/UL (ref 0–0.01)
NRBC BLD-RTO: 0 PER 100 WBC
PLATELET # BLD AUTO: 267 K/UL (ref 150–400)
PMV BLD AUTO: 9.4 FL (ref 8.9–12.9)
POTASSIUM SERPL-SCNC: 3.6 MMOL/L (ref 3.5–5.1)
PROTHROMBIN TIME: 22.6 SEC (ref 9–11.1)
RBC # BLD AUTO: 4.38 M/UL (ref 4.1–5.7)
SODIUM SERPL-SCNC: 142 MMOL/L (ref 136–145)
WBC # BLD AUTO: 14.3 K/UL (ref 4.1–11.1)

## 2019-12-06 PROCEDURE — 80048 BASIC METABOLIC PNL TOTAL CA: CPT

## 2019-12-06 PROCEDURE — 36415 COLL VENOUS BLD VENIPUNCTURE: CPT

## 2019-12-06 PROCEDURE — 65270000029 HC RM PRIVATE

## 2019-12-06 PROCEDURE — 74011250637 HC RX REV CODE- 250/637: Performed by: INTERNAL MEDICINE

## 2019-12-06 PROCEDURE — 94640 AIRWAY INHALATION TREATMENT: CPT

## 2019-12-06 PROCEDURE — 97530 THERAPEUTIC ACTIVITIES: CPT

## 2019-12-06 PROCEDURE — 74011250637 HC RX REV CODE- 250/637: Performed by: HOSPITALIST

## 2019-12-06 PROCEDURE — 74011250636 HC RX REV CODE- 250/636: Performed by: INTERNAL MEDICINE

## 2019-12-06 PROCEDURE — 85610 PROTHROMBIN TIME: CPT

## 2019-12-06 PROCEDURE — 85025 COMPLETE CBC W/AUTO DIFF WBC: CPT

## 2019-12-06 PROCEDURE — 74011250636 HC RX REV CODE- 250/636: Performed by: HOSPITALIST

## 2019-12-06 PROCEDURE — 74011000250 HC RX REV CODE- 250: Performed by: INTERNAL MEDICINE

## 2019-12-06 PROCEDURE — 74011000258 HC RX REV CODE- 258: Performed by: INTERNAL MEDICINE

## 2019-12-06 RX ORDER — WARFARIN 2.5 MG/1
2.5 TABLET ORAL ONCE
Status: COMPLETED | OUTPATIENT
Start: 2019-12-06 | End: 2019-12-06

## 2019-12-06 RX ORDER — LANOLIN ALCOHOL/MO/W.PET/CERES
3 CREAM (GRAM) TOPICAL
Status: DISCONTINUED | OUTPATIENT
Start: 2019-12-06 | End: 2019-12-14 | Stop reason: HOSPADM

## 2019-12-06 RX ADMIN — LISINOPRIL 10 MG: 10 TABLET ORAL at 10:20

## 2019-12-06 RX ADMIN — ARFORMOTEROL TARTRATE 15 MCG: 15 SOLUTION RESPIRATORY (INHALATION) at 09:16

## 2019-12-06 RX ADMIN — VANCOMYCIN HYDROCHLORIDE 1250 MG: 10 INJECTION, POWDER, LYOPHILIZED, FOR SOLUTION INTRAVENOUS at 04:43

## 2019-12-06 RX ADMIN — METOPROLOL TARTRATE 50 MG: 50 TABLET, FILM COATED ORAL at 18:14

## 2019-12-06 RX ADMIN — MELATONIN 3 MG: at 21:31

## 2019-12-06 RX ADMIN — BUDESONIDE 500 MCG: 0.5 INHALANT RESPIRATORY (INHALATION) at 09:16

## 2019-12-06 RX ADMIN — AMLODIPINE BESYLATE 5 MG: 5 TABLET ORAL at 10:20

## 2019-12-06 RX ADMIN — ARFORMOTEROL TARTRATE 15 MCG: 15 SOLUTION RESPIRATORY (INHALATION) at 20:28

## 2019-12-06 RX ADMIN — FENTANYL CITRATE 25 MCG: 50 INJECTION INTRAMUSCULAR; INTRAVENOUS at 02:40

## 2019-12-06 RX ADMIN — BUDESONIDE 500 MCG: 0.5 INHALANT RESPIRATORY (INHALATION) at 20:28

## 2019-12-06 RX ADMIN — DILTIAZEM HYDROCHLORIDE 30 MG: 30 TABLET, FILM COATED ORAL at 16:30

## 2019-12-06 RX ADMIN — METOPROLOL TARTRATE 50 MG: 50 TABLET, FILM COATED ORAL at 10:20

## 2019-12-06 RX ADMIN — Medication 10 ML: at 21:31

## 2019-12-06 RX ADMIN — CEFEPIME HYDROCHLORIDE 2 G: 2 INJECTION, POWDER, FOR SOLUTION INTRAVENOUS at 03:46

## 2019-12-06 RX ADMIN — ASPIRIN 81 MG: 81 TABLET, COATED ORAL at 10:20

## 2019-12-06 RX ADMIN — VANCOMYCIN HYDROCHLORIDE 1250 MG: 10 INJECTION, POWDER, LYOPHILIZED, FOR SOLUTION INTRAVENOUS at 23:34

## 2019-12-06 RX ADMIN — FUROSEMIDE 20 MG: 20 TABLET ORAL at 10:19

## 2019-12-06 RX ADMIN — Medication 1 CAPSULE: at 10:19

## 2019-12-06 RX ADMIN — DILTIAZEM HYDROCHLORIDE 30 MG: 30 TABLET, FILM COATED ORAL at 07:09

## 2019-12-06 RX ADMIN — ATORVASTATIN CALCIUM 40 MG: 40 TABLET, FILM COATED ORAL at 10:20

## 2019-12-06 RX ADMIN — WARFARIN SODIUM 2.5 MG: 2.5 TABLET ORAL at 19:04

## 2019-12-06 RX ADMIN — DILTIAZEM HYDROCHLORIDE 30 MG: 30 TABLET, FILM COATED ORAL at 11:26

## 2019-12-06 RX ADMIN — Medication 100 MG: at 10:20

## 2019-12-06 NOTE — PROGRESS NOTES
[facility] Adult  Hospitalist Group                                                                                          Hospitalist Progress Note  Camilla Martinez MD  Answering service: [phone] or [phone] from in house phone        Date of Service:  2019  NAME:  Cisco Gilbert  :  1935  MRN:  [id number]      Admission Summary:       CC: altered mental state        HPI: 81 y.o man w/ afib, CVA, HTN, COPD, probable dementia, nursing home resident, who presents with AMS. He was reportedly more confused than baseline during the day. He was seen by [facility] and was diagnosed with a UTI based on a urine dipstick and cellulitis of the right arm. He was sent to the ED due to the increase in confusion. His son describes that he is confused at baseline and the severity is variable, and that he is back at baseline currently. He has not been formally diagnosed with dementia. No known fever or pain. The patient denies any complaints but he is a poor historian.       Interval history / Subjective:       Patient is seen and examined at bedside. He is sleepy. Still has right arm swelling and erythema but improving. Assessment & Plan:     R arm cellulitis with R elbow effusion: improving   - s/p arthrocentesis .   - XR:  Nonspecific joint effusion. No evidence of fracture or osteomyelitis  - Ortho on board: may consider MRI if needed   - blood cx negative. Fluid cx so far negative  -mild leucocytosis 11.9  - venous duplex negative for DVT   - appreciate ID input  - abx broadened to IV vanco and cefepime  - needs arm elevated     Acute metabolic encephalopathy - improving  Underlying dementia   - CT head: No evidence of acute infarct or intracranial hemorrhage. Periventricular white matter disease is likely secondary to chronic small vessel ischemic changes. Chronic left posterior parietal infarct.   - Acute agitation on  pm. Haldol x 1 dose given   - supportive care    Chronic atrial fibrillation:   - rate controlled on diltiazem, metoprolol. Pharmacy to dose coumadin   Supra therapeutic INR on poa- resolved. COPD w/ chronic hypoxic respiratory failure: stable  -on 2l NC O2  -inhaled bronchodilators    UTI ruled out   - this is based on an outpatient urine dipstick that showed +++ leukocyte esterase and + nitrite. - urine cx 11/27: negative     Hypokalemia - replaced     HTN: Bp stable. continue lisinopril, diltiazem, metoprolol , amlodipine , lasix    History of alcohol abuse: remote, resolved per son  History of CVA: continue aspirin, statin       DVT ppx: anticoagulated  Code status: DNR - son states he has a DDNR at home  Care Plan discussed with: Patient/Family  Disposition: TBD. Need SNF. Possible dc on Monday      Hospital Problems  Never Reviewed          Codes Class Noted POA    Cellulitis of right arm ICD-10-CM: L03.113  ICD-9-CM: 682.3  11/27/2019 Unknown                Review of Systems:   Review of systems not obtained due to patient factors. Vital Signs:    Last 24hrs VS reviewed since prior progress note. Most recent are:  Visit Vitals  /70 (BP 1 Location: Left arm, BP Patient Position: At rest;Supine)   Pulse 92   Temp 98 °F (36.7 °C)   Resp 20   Ht 5' 10\" (1.778 m)   Wt 81.6 kg (179 lb 14.3 oz)   SpO2 95%   BMI 25.81 kg/m²       No intake or output data in the 24 hours ending 12/06/19 1140     Physical Examination:             Constitutional:  No acute distress     ENT:  Oral mucous moist, oropharynx benign. Resp:  No wheezing/rhonchi/rales. No accessory muscle use   CV:  Regular rhythm, normal rate, no murmurs, gallops, rubs    GI:  Soft, non distended, non tender. normoactive bowel sounds, no hepatosplenomegaly     Musculoskeletal:  No edema, warm, 2+ pulses throughout    Neurologic:  Moves all extremities.   Demented      Psych: not agitated not anxious   Skin:  rt arm , edematous and erythematous        Data Review:    Review and/or order of clinical lab test      Labs:     Recent Labs     12/06/19 0538 12/05/19 0400   WBC 14.3* 11.9*   HGB 13.4 13.2   HCT 44.9 43.9    279     Recent Labs     12/06/19 0538 12/05/19 0400 12/04/19 0352    145 144   K 3.6 3.5 3.3*    104 103   CO2 39* 39* 36*   BUN 14 13 13   CREA 0.84 0.79 0.90   * 116* 120*   CA 9.7 9.8 9.6     No results for input(s): SGOT, GPT, ALT, AP, TBIL, TBILI, TP, ALB, GLOB, GGT, AML, LPSE in the last 72 hours. No lab exists for component: AMYP, HLPSE  Recent Labs     12/05/19 0400 12/04/19 0352   INR 2.9* 3.6*   PTP 27.4* 34.5*      No results for input(s): FE, TIBC, PSAT, FERR in the last 72 hours. No results found for: FOL, RBCF   No results for input(s): PH, PCO2, PO2 in the last 72 hours. No results for input(s): CPK, CKNDX, TROIQ in the last 72 hours.     No lab exists for component: CPKMB  No results found for: CHOL, CHOLX, CHLST, CHOLV, HDL, HDLP, LDL, LDLC, DLDLP, TGLX, TRIGL, TRIGP, CHHD, CHHDX  No results found for: [facility]  Lab Results   Component Value Date/Time    Color YELLOW/STRAW 11/27/2019 09:08 PM    Appearance CLOUDY (A) 11/27/2019 09:08 PM    Specific gravity 1.020 11/27/2019 09:08 PM    pH (UA) 6.0 11/27/2019 09:08 PM    Protein 100 (A) 11/27/2019 09:08 PM    Glucose NEGATIVE  11/27/2019 09:08 PM    Ketone TRACE (A) 11/27/2019 09:08 PM    Bilirubin NEGATIVE  11/27/2019 09:08 PM    Urobilinogen 1.0 11/27/2019 09:08 PM    Nitrites NEGATIVE  11/27/2019 09:08 PM    Leukocyte Esterase LARGE (A) 11/27/2019 09:08 PM    Epithelial cells FEW 11/27/2019 09:08 PM    Bacteria NEGATIVE  11/27/2019 09:08 PM    WBC >100 (H) 11/27/2019 09:08 PM    RBC >100 (H) 11/27/2019 09:08 PM         Medications Reviewed:     Current Facility-Administered Medications   Medication Dose Route Frequency    influenza vaccine 2019-20 (6 mos+)(PF) (FLUARIX/FLULAVAL/FLUZONE QUAD) injection 0.5 mL  0.5 mL IntraMUSCular PRIOR TO DISCHARGE    Warfarin - Pharmacy to Dose Other Rx Dosing/Monitoring    cefepime (MAXIPIME) 2 g in 0.9% sodium chloride (MBP/ADV) 100 mL  2 g IntraVENous Q12H    Vancomycin - Pharmacy to Dose   Other Rx Dosing/Monitoring    vancomycin (VANCOCIN) 1250 mg in  ml infusion  1,250 mg IntraVENous Q18H    albuterol-ipratropium (DUO-NEB) 2.5 MG-0.5 MG/3 ML  3 mL Nebulization Q6H PRN    lactobac ac& pc-s.therm-b.anim (HELADIO Q/RISAQUAD)  1 Cap Oral DAILY    arformoterol (BROVANA) neb solution 15 mcg  15 mcg Nebulization BID RT    And    budesonide (PULMICORT) 500 mcg/2 ml nebulizer suspension  500 mcg Nebulization BID RT    traMADol (ULTRAM) tablet 50 mg  50 mg Oral Q6H PRN    fentaNYL citrate (PF) injection 25 mcg  25 mcg IntraVENous Q4H PRN    hydrALAZINE (APRESOLINE) 20 mg/mL injection 20 mg  20 mg IntraVENous Q6H PRN    lisinopril (PRINIVIL, ZESTRIL) tablet 10 mg  10 mg Oral DAILY    amLODIPine (NORVASC) tablet 5 mg  5 mg Oral DAILY    sodium chloride (NS) flush 5-40 mL  5-40 mL IntraVENous Q8H    sodium chloride (NS) flush 5-40 mL  5-40 mL IntraVENous PRN    acetaminophen (TYLENOL) tablet 650 mg  650 mg Oral Q4H PRN    ondansetron (ZOFRAN) injection 4 mg  4 mg IntraVENous Q4H PRN    aspirin delayed-release tablet 81 mg  81 mg Oral DAILY    atorvastatin (LIPITOR) tablet 40 mg  40 mg Oral DAILY    dilTIAZem (CARDIZEM) IR tablet 30 mg  30 mg Oral TIDAC    furosemide (LASIX) tablet 20 mg  20 mg Oral DAILY    metoprolol tartrate (LOPRESSOR) tablet 50 mg  50 mg Oral BID    thiamine HCL (B-1) tablet 100 mg  100 mg Oral DAILY     ______________________________________________________________________  EXPECTED LENGTH OF STAY: - - -  ACTUAL LENGTH OF STAY:          9                 Nadine Martins MD

## 2019-12-06 NOTE — PROGRESS NOTES
Pharmacist Note - Warfarin Dosing  Consult provided for this 84 y.o.male to manage warfarin for Atrial Fibrillation    INR Goal: 2 - 3    Home regimen/ tablet size: 4 mg daily    Drugs that may increase INR: None  Drugs that may decrease INR: None  Other current anticoagulants/ drugs that may increase bleeding risk: Aspirin  Risk factors: Age > 65  Daily INR ordered: YES    Recent Labs     12/06/19  1310 12/06/19  0538 12/05/19  0400 12/04/19  0352   HGB  --  13.4 13.2 13.8   INR 2.3*  --  2.9* 3.6*     Date               INR                  Dose  11/27  2.7  4 mg (took PTA)   11/28  2.6  4 mg   11/29  3  2.5 mg   11/30               2.7                  3 mg  12/01               4.2                  Held  12/02  4.4  Held  -- Consult discontinued - Reconsulted 12/04 --  12/04  3.6  Hold   12/05  2.9  3 mg  12/06  2.3   2.5 mg                                                                               Assessment/ Plan:  Warfarin 2.5 mg x 1    Pharmacy will continue to monitor daily and adjust therapy as indicated. Please contact the pharmacist at  for outpatient recommendations if needed.      Genevieve Hennessy, PharmD  Clinical Pharmacist, Orthopedics and Med/Surg () [address] [facility] [phone]

## 2019-12-06 NOTE — PROGRESS NOTES
Infectious Disease Progress Note          HPI:    Mr Dylon Crawford seen earlier  He is sleeping more than his baseline per son .   Cannot get history from him       Medications:    Current Facility-Administered Medications:     influenza vaccine 2019-20 (6 mos+)(PF) (FLUARIX/FLULAVAL/FLUZONE QUAD) injection 0.5 mL, 0.5 mL, IntraMUSCular, PRIOR TO DISCHARGE, Madala, Sushma, MD    Warfarin - Pharmacy to Dose, , Other, Rx Dosing/Monitoring, Michelle Chanel MD    Vancomycin - Pharmacy to Dose, , Other, Rx Dosing/Monitoring, Yokasta Drummond DO    [COMPLETED] vancomycin (VANCOCIN) 2000 mg in  ml infusion, 2,000 mg, IntraVENous, ONCE, Last Rate: 250 mL/hr at 12/04/19 1732, 2,000 mg at 12/04/19 1732 **FOLLOWED BY** vancomycin (VANCOCIN) 1250 mg in  ml infusion, 1,250 mg, IntraVENous, Q18H, Yokasta Drummond DO, Last Rate: 125 mL/hr at 12/06/19 0443, 1,250 mg at 12/06/19 0443    albuterol-ipratropium (DUO-NEB) 2.5 MG-0.5 MG/3 ML, 3 mL, Nebulization, Q6H PRN, Nitesh Griggs MD    lactobac ac& pc-s.therm-b.anim (HELADIO Q/RISAQUAD), 1 Cap, Oral, DAILY, Nitesh Griggs MD, 1 Cap at 12/06/19 1019    arformoterol (BROVANA) neb solution 15 mcg, 15 mcg, Nebulization, BID RT, 15 mcg at 12/06/19 0916 **AND** budesonide (PULMICORT) 500 mcg/2 ml nebulizer suspension, 500 mcg, Nebulization, BID RT, Nitesh Griggs MD, 500 mcg at 12/06/19 0916    traMADol (ULTRAM) tablet 50 mg, 50 mg, Oral, Q6H PRN, Oscar Durant MD, 50 mg at 12/05/19 0156    fentaNYL citrate (PF) injection 25 mcg, 25 mcg, IntraVENous, Q4H PRN, Oscar Durant MD, 25 mcg at 12/06/19 0240    hydrALAZINE (APRESOLINE) 20 mg/mL injection 20 mg, 20 mg, IntraVENous, Q6H PRN, Nitesh Griggs MD    lisinopril (PRINIVIL, ZESTRIL) tablet 10 mg, 10 mg, Oral, DAILY, Nitesh Griggs MD, 10 mg at 12/06/19 1020    amLODIPine (NORVASC) tablet 5 mg, 5 mg, Oral, DAILY, Nitesh Griggs MD, 5 mg at 12/06/19 1020    sodium chloride (NS) flush 5-40 mL, 5-40 mL, IntraVENous, Q8H, Tye Salmon MD, Stopped at 12/06/19 0710    sodium chloride (NS) flush 5-40 mL, 5-40 mL, IntraVENous, PRN, Tye Salmon MD    acetaminophen (TYLENOL) tablet 650 mg, 650 mg, Oral, Q4H PRN, Shabbir Lee MD, 650 mg at 11/27/19 2150    ondansetron (ZOFRAN) injection 4 mg, 4 mg, IntraVENous, Q4H PRN, Tye Salmon MD    aspirin delayed-release tablet 81 mg, 81 mg, Oral, DAILY, Tye Salmon MD, 81 mg at 12/06/19 1020    atorvastatin (LIPITOR) tablet 40 mg, 40 mg, Oral, DAILY, Tye Salmon MD, 40 mg at 12/06/19 1020    dilTIAZem (CARDIZEM) IR tablet 30 mg, 30 mg, Oral, TIDAC, Tye Salmon MD, 30 mg at 12/06/19 1126    furosemide (LASIX) tablet 20 mg, 20 mg, Oral, DAILY, Tye Salmon MD, 20 mg at 12/06/19 1019    metoprolol tartrate (LOPRESSOR) tablet 50 mg, 50 mg, Oral, BID, Tye Salmon MD, 50 mg at 12/06/19 1020    thiamine HCL (B-1) tablet 100 mg, 100 mg, Oral, DAILY, Tye Salmon MD, 100 mg at 12/06/19 1020          Physical Exam:    Vitals:   Patient Vitals for the past 24 hrs:   Temp Pulse Resp BP SpO2   12/06/19 1127  92      12/06/19 1121  (!) 128   95 %   12/06/19 1119  84   (!) 89 %   12/06/19 1112  (!) 102  151/70 97 %   12/06/19 1019  89  155/85    12/06/19 0913     95 %   12/06/19 0531 98 °F (36.7 °C) 98 20 158/81 95 %   12/05/19 2019     98 %   12/05/19 2013 98.4 °F (36.9 °C) 85 20 166/83    12/05/19 2002     99 %   12/05/19 1447 98.3 °F (36.8 °C) 78 20 124/73 92 %     · GEN: NAD  · HEENT:  Mouth open, no thrush   · CV: S1, S2 heard regularly,  · Lungs: Clear to auscultation bilaterally anteriorly  · Abdomen: soft, non distended, nonfacial grimace to palpation   · Extremities: no edema  lower extremities  ·  skin: no rash  Musculoskeletal: No facial grimace to palpation of the right elbow, + edema of RUE , + erythema and warmth improving   Labs:   Recent Results (from the past 24 hour(s)) METABOLIC PANEL, BASIC    Collection Time: 12/06/19  5:38 AM   Result Value Ref Range    Sodium 142 136 - 145 mmol/L    Potassium 3.6 3.5 - 5.1 mmol/L    Chloride 101 97 - 108 mmol/L    CO2 39 (H) 21 - 32 mmol/L    Anion gap 2 (L) 5 - 15 mmol/L    Glucose 104 (H) 65 - 100 mg/dL    BUN 14 6 - 20 MG/DL    Creatinine 0.84 0.70 - 1.30 MG/DL    BUN/Creatinine ratio 17 12 - 20      GFR est AA >60 >60 ml/min/1.73m2    GFR est non-AA >60 >60 ml/min/1.73m2    Calcium 9.7 8.5 - 10.1 MG/DL   CBC WITH AUTOMATED DIFF    Collection Time: 12/06/19  5:38 AM   Result Value Ref Range    WBC 14.3 (H) 4.1 - 11.1 K/uL    RBC 4.38 4.10 - 5.70 M/uL    HGB 13.4 12.1 - 17.0 g/dL    HCT 44.9 36.6 - 50.3 %    .5 (H) 80.0 - 99.0 FL    MCH 30.6 26.0 - 34.0 PG    MCHC 29.8 (L) 30.0 - 36.5 g/dL    RDW 12.9 11.5 - 14.5 %    PLATELET 397 684 - 421 K/uL    MPV 9.4 8.9 - 12.9 FL    NRBC 0.0 0  WBC    ABSOLUTE NRBC 0.00 0.00 - 0.01 K/uL    NEUTROPHILS 85 (H) 32 - 75 %    LYMPHOCYTES 6 (L) 12 - 49 %    MONOCYTES 6 5 - 13 %    EOSINOPHILS 1 0 - 7 %    BASOPHILS 1 0 - 1 %    IMMATURE GRANULOCYTES 1 (H) 0.0 - 0.5 %    ABS. NEUTROPHILS 12.3 (H) 1.8 - 8.0 K/UL    ABS. LYMPHOCYTES 0.8 0.8 - 3.5 K/UL    ABS. MONOCYTES 0.9 0.0 - 1.0 K/UL    ABS. EOSINOPHILS 0.1 0.0 - 0.4 K/UL    ABS. BASOPHILS 0.1 0.0 - 0.1 K/UL    ABS. IMM.  GRANS. 0.1 (H) 0.00 - 0.04 K/UL    DF AUTOMATED     PROTHROMBIN TIME + INR    Collection Time: 12/06/19  1:10 PM   Result Value Ref Range    INR 2.3 (H) 0.9 - 1.1      Prothrombin time 22.6 (H) 9.0 - 11.1 sec       Microbiology Data:       Blood: 11/27/19      Component Value Ref Range & Units Status   Special Requests: NO SPECIAL REQUESTS    Final   Culture result: NO GROWTH 5 DAYS    Final   Result History               Synovial/Joint fluid: 11/27/19      Urine 11/27/19  Component Value Ref Range & Units Status   Special Requests: NO SPECIAL REQUESTS    Final   [place] Count <10,000   COLONIES/mL    Final   Culture result: NO SIGNIFICANT GROWTH    Final   Result History   Specimen Information: Joint Fluid        Component Value Ref Range & Units Status   Special Requests: NO SPECIAL REQUESTS    Preliminary   GRAM STAIN 1+ WBCS SEEN    Preliminary   GRAM STAIN NO ORGANISMS SEEN    Preliminary   Culture result:    Preliminary   Culture performed on Unspun Fluid    Culture result:    Preliminary   No growth thus far, holding 14 days. Result History             Imaging:   CT Head 11/27/19  IMPRESSION  Impression:   1. No evidence of acute infarct or intracranial hemorrhage. 2. Periventricular white matter disease is likely secondary to chronic small  vessel ischemic changes. 3. Chronic left posterior parietal infarct. CXR 11/27/19   FINDINGS: PA and lateral views of the chest demonstrate a stable  cardiomediastinal silhouette and no focal airspace disease. There are small  bilateral pleural effusions. . The visualized osseous structures are  unremarkable.     IMPRESSION  IMPRESSION: Small bilateral pleural effusions.     Xray R elbow  11/27/19   FINDINGS: Three views of the right elbow demonstrate antecubital intravenous  access. Elbow joint effusion is moderate. Bones are osteopenic. No acute  fracture or evidence of osteomyelitis. Mild ulnotrochlear osteoarthritis.     IMPRESSION  IMPRESSION:      Nonspecific joint effusion. No evidence of fracture or osteomyelitis. Right Upper Venous  12/4/19    No evidence of acute DVT and chronic DVT. The internal jugular, subclavian, axillary, brachial prox, brachial mid, brachial dist, cephalic upper arm and basilic upper arm vein(s) were visualized in the transverse and longitudinal views. The vessels showed normal color filling and compressibility. Doppler interrogation showed phasic and spontaneous flow.          Procedures:   11/27/19  R elbow US guided aspiration : aspirated 3 cc cloudy fluid     Assessment / Plan:     Mr. Eunice Lees is an 60-year-old gentleman with hypertension, atrial fibrillation, COPD per the chart  admitted on 11/27/2019 with change in mental status and found to have right upper extremity cellulitis. He status post aspiration of the right elbow with cultures that are negative. No crystals seen in the fluid . Blood cultures negative . He continues to have cellulitis of the right upper extremity and edema. 1) right upper extremity cellulitis and edema  Unclear of any insect or animal bite, no clear puncture wound  except one spot that is likely where he had ultrasound-guided aspiration   Synovial fluid with 1+ WBCs and no growth on culture  Synovial fluid with 19,880 nucleated cells , 98% neutrophils   He has been on vancomycin which has been stopped  Currently on ceftriaxone    Recommendations  Vancomycin IV  Will stop Cefepime IV as erythema  Improving  Patient is more sleepy per son and rarely Cefepime can cause akinetic seizures but no sure there is a pure temporal correlation  Between cefepime or not   Upper extremity elevation recommended which is very challenging in this patient. D/W wt primary team about propping multiple pillows and or sling to keep his arm elevated   There is still  lot of dependent edema on exam   Will need to monitor renal function very closely given age concern for nephrotoxicity with antibiotic use  High risk for C. Difficile and other antibiotic associated adverse effects   Probiotics daily  Vancomycin trough goal 10-15  Monitor for diarrhea      2) atrial fibrillation per chart  Noted on warfarin  Antibiotics can interfere with anticoagulants and need close INR monitoring per primary team's and pharmacy    3) hypertension     4) COPD per chart      5) DVT prophylaxis      Plan discussed with son today     Thank for the opportunity to participate in the care of this patient. Please contact with questions or concerns.        Salem Curling, DO  1:53 PM

## 2019-12-06 NOTE — PROGRESS NOTES
Problem: Mobility Impaired (Adult and Pediatric)  Goal: *Acute Goals and Plan of Care (Insert Text)  Description  FUNCTIONAL STATUS PRIOR TO ADMISSION: Patient resides in assisted living and has dementia. Pt was unable to provide prior level of function due to dementia. HOME SUPPORT PRIOR TO ADMISSION: Pt lives in assisted living facility. Physical Therapy Goals  Initiated 12/3/2019  1. Patient will move from supine to sit and sit to supine  in bed with supervision within 7 day(s). 2.  Patient will transfer from bed to chair and chair to bed with minimal assistance/contact guard assist using the least restrictive device within 7 day(s). 3.  Patient will perform sit to stand with minimal assistance/contact guard assist within 7 day(s). 4.  Patient will ambulate with minimal assistance/contact guard assist for 75 feet with the least restrictive device within 7 day(s). Outcome: Progressing Towards Goal   PHYSICAL THERAPY TREATMENT  Patient: Agustin King (89 y.o. male)  Date: 12/6/2019  Diagnosis: Cellulitis of right arm [L03.113]   Precautions: Fall, Skin  Chart, physical therapy assessment, plan of care and goals were reviewed. ASSESSMENT  Patient continues with skilled PT services and is progressing minimally towards goals. Pt [place] and difficult to follow at times, but pt agreeable to bed mobility, transfers, and attempts for brief steps to [facility]. PT/tech offered for pt OOB to chair with return via criss lift, however, RN reports some behavioral challenges and impulsivity previously-requested back to bed at this time. Pt currently requires assist x2 for all functional mobility and he is noted to desat on 3.5L NC despite excellent PLB. He remains weak with imbalance and incoordination. He is also noted to be tachycardic with bed mobility and transfers. Pt in chair position in bed with all needs met when left. PCT notified of need for brief change.     Current Level of Function Impacting Discharge (mobility/balance): mod-maxA x2    Other factors to consider for discharge: pt also presents with dementia and [place]         PLAN :  Patient continues to benefit from skilled intervention to address the above impairments. Continue treatment per established plan of care. to address goals. Recommendation for discharge: (in order for the patient to meet his/her long term goals)  Therapy up to 5 days/week in SNF setting    This discharge recommendation:  A follow-up discussion with the attending provider and/or case management is planned    IF patient discharges home will need the following DME: none       SUBJECTIVE:   Patient stated Joesph Hirsch are we doing now?     OBJECTIVE DATA SUMMARY:   Critical Behavior:  Neurologic State: Alert, Confused  Orientation Level: Disoriented X4  Cognition: Follows commands  Safety/Judgement: Decreased awareness of need for safety, Decreased insight into deficits, Decreased awareness of need for assistance, Decreased awareness of environment  Functional Mobility Training:  Bed Mobility:  Rolling: (NT)  Supine to Sit: Moderate assistance; Additional time  Sit to Supine: Moderate assistance; Additional time;Assist x2  Scooting: Moderate assistance; Additional time  Transfers:  Sit to Stand: Moderate assistance; Additional time;Assist x2(EOB elevated for ease)  Stand to Sit: Moderate assistance; Additional time;Assist x2  Bed to Chair: (RN requested return to bed)  Balance:  Sitting: Impaired; Without support  Sitting - Static: Fair (occasional)  Sitting - Dynamic: Poor (constant support)  Standing: Impaired; With support  Standing - Static: Poor;Constant support  Standing - Dynamic : Poor;Constant support  Pain Rating:  No VAS provided but discomfort expressed in RUE with WBing for bed mobility    Activity Tolerance:   Poor, desaturates with exertion and requires oxygen, requires rest breaks, requires frequent rest breaks, and observed SOB with activity  Please refer to the flowsheet for vital signs taken during this treatment.     After treatment patient left in no apparent distress:   Supine in bed, Call bell within reach, Bed / chair alarm activated, and Side rails x 3    COMMUNICATION/COLLABORATION:   The patients plan of care was discussed with: Registered Nurse    Yusuf Clark   Time Calculation: 31 mins

## 2019-12-06 NOTE — PROGRESS NOTES
Orthopedic & Surgical Nursing Communication Tool        Bedside and Verbal shift change report given to Benjamin Hartmann RN (incoming nurse) by Kesha Veliz RN (outgoing nurse) on Danisha Cooper a 81 y.o. male and born 1935 who arrived at the hospital on 11/27/2019  2:37 PM. Report included the following information SBAR, Kardex and MAR. Significant changes during shift: patient confused, several occusions patient is agitated and wanting to leave.       Issues for physician to address: none          Pain Controlled          [x] yes          [] no    Bowel Movement          [] yes          [x] no          Code Status: DNR     Admit Diagnosis: Cellulitis of right arm [L33.657]     Surgery: * No surgery found *     Infections: No current active infections     Allergies: Seroquel [quetiapine]     Current diet: DIET CARDIAC Regular  DIET ONE TIME MESSAGE  DIET ONE TIME MESSAGE           Vital Signs:     Patient Vitals for the past 12 hrs:   Temp Pulse Resp BP SpO2   12/06/19 0531 98 °F (36.7 °C) 98 20 158/81 95 %      Intake & Output:   No intake or output data in the 24 hours ending 12/06/19 0843   Laboratory Results:     Recent Results (from the past 12 hour(s))   METABOLIC PANEL, BASIC    Collection Time: 12/06/19  5:38 AM   Result Value Ref Range    Sodium 142 136 - 145 mmol/L    Potassium 3.6 3.5 - 5.1 mmol/L    Chloride 101 97 - 108 mmol/L    CO2 39 (H) 21 - 32 mmol/L    Anion gap 2 (L) 5 - 15 mmol/L    Glucose 104 (H) 65 - 100 mg/dL    BUN 14 6 - 20 MG/DL    Creatinine 0.84 0.70 - 1.30 MG/DL    BUN/Creatinine ratio 17 12 - 20      GFR est AA >60 >60 ml/min/1.73m2    GFR est non-AA >60 >60 ml/min/1.73m2    Calcium 9.7 8.5 - 10.1 MG/DL   CBC WITH AUTOMATED DIFF    Collection Time: 12/06/19  5:38 AM   Result Value Ref Range    WBC 14.3 (H) 4.1 - 11.1 K/uL    RBC 4.38 4.10 - 5.70 M/uL    HGB 13.4 12.1 - 17.0 g/dL    HCT 44.9 36.6 - 50.3 %    .5 (H) 80.0 - 99.0 FL    MCH 30.6 26.0 - 34.0 PG    MCHC 29.8 (L) 30.0 - 36.5 g/dL    RDW 12.9 11.5 - 14.5 %    PLATELET 302 695 - 057 K/uL    MPV 9.4 8.9 - 12.9 FL    NRBC 0.0 0  WBC    ABSOLUTE NRBC 0.00 0.00 - 0.01 K/uL    NEUTROPHILS 85 (H) 32 - 75 %    LYMPHOCYTES 6 (L) 12 - 49 %    MONOCYTES 6 5 - 13 %    EOSINOPHILS 1 0 - 7 %    BASOPHILS 1 0 - 1 %    IMMATURE GRANULOCYTES 1 (H) 0.0 - 0.5 %    ABS. NEUTROPHILS 12.3 (H) 1.8 - 8.0 K/UL    ABS. LYMPHOCYTES 0.8 0.8 - 3.5 K/UL    ABS. MONOCYTES 0.9 0.0 - 1.0 K/UL    ABS. EOSINOPHILS 0.1 0.0 - 0.4 K/UL    ABS. BASOPHILS 0.1 0.0 - 0.1 K/UL    ABS. IMM. GRANS. 0.1 (H) 0.00 - 0.04 K/UL    DF AUTOMATED              Opportunity for questions and clarifications were given to the incoming nurse. Patient's bed locked and is in low position, side rails up x2, door open PRN, call bell within reach of patient and patient not in distress.       Signed by: Irena Lawson RN, BSN, [address] [place] [address] Drive                       12/6/2019 at 8:43 AM

## 2019-12-06 NOTE — PROGRESS NOTES
BELLA:  1. Auth obtained. [address] on Monday. CM notified that Adriano Capone is obtained from [place], and will provide to [facility]. Per attending MD, he should be ready for discharge on Monday.     Laura Fraga [facility]

## 2019-12-07 ENCOUNTER — APPOINTMENT (OUTPATIENT)
Dept: GENERAL RADIOLOGY | Age: 84
DRG: 602 | End: 2019-12-07
Attending: INTERNAL MEDICINE
Payer: MEDICARE

## 2019-12-07 LAB
ANION GAP SERPL CALC-SCNC: 1 MMOL/L (ref 5–15)
ARTERIAL PATENCY WRIST A: ABNORMAL
ARTERIAL PATENCY WRIST A: YES
ATRIAL RATE: 85 BPM
BASE EXCESS BLD CALC-SCNC: 19 MMOL/L
BASOPHILS # BLD: 0.1 K/UL (ref 0–0.1)
BASOPHILS NFR BLD: 1 % (ref 0–1)
BDY SITE: ABNORMAL
BDY SITE: ABNORMAL
BNP SERPL-MCNC: 5072 PG/ML
BUN SERPL-MCNC: 16 MG/DL (ref 6–20)
BUN/CREAT SERPL: 21 (ref 12–20)
CALCIUM SERPL-MCNC: 9.9 MG/DL (ref 8.5–10.1)
CALCULATED R AXIS, ECG10: -36 DEGREES
CALCULATED T AXIS, ECG11: -76 DEGREES
CHLORIDE SERPL-SCNC: 101 MMOL/L (ref 97–108)
CO2 SERPL-SCNC: 41 MMOL/L (ref 21–32)
CREAT SERPL-MCNC: 0.76 MG/DL (ref 0.7–1.3)
DATE LAST DOSE: ABNORMAL
DIAGNOSIS, 93000: NORMAL
DIFFERENTIAL METHOD BLD: ABNORMAL
EOSINOPHIL # BLD: 0.1 K/UL (ref 0–0.4)
EOSINOPHIL NFR BLD: 1 % (ref 0–7)
ERYTHROCYTE [DISTWIDTH] IN BLOOD BY AUTOMATED COUNT: 13.2 % (ref 11.5–14.5)
GAS FLOW.O2 O2 DELIVERY SYS: ABNORMAL L/MIN
GAS FLOW.O2 O2 DELIVERY SYS: ABNORMAL L/MIN
GAS FLOW.O2 SETTING OXYMISER: 4 L/M
GLUCOSE BLD STRIP.AUTO-MCNC: 122 MG/DL (ref 65–100)
GLUCOSE SERPL-MCNC: 126 MG/DL (ref 65–100)
HCO3 BLD-SCNC: 44.1 MMOL/L (ref 22–26)
HCT VFR BLD AUTO: 47.6 % (ref 36.6–50.3)
HGB BLD-MCNC: 13.9 G/DL (ref 12.1–17)
IMM GRANULOCYTES # BLD AUTO: 0 K/UL
IMM GRANULOCYTES NFR BLD AUTO: 0 %
INR PPP: 2 (ref 0.9–1.1)
LYMPHOCYTES # BLD: 0.7 K/UL (ref 0.8–3.5)
LYMPHOCYTES NFR BLD: 5 % (ref 12–49)
MCH RBC QN AUTO: 30.5 PG (ref 26–34)
MCHC RBC AUTO-ENTMCNC: 29.2 G/DL (ref 30–36.5)
MCV RBC AUTO: 104.4 FL (ref 80–99)
MONOCYTES # BLD: 0.9 K/UL (ref 0–1)
MONOCYTES NFR BLD: 6 % (ref 5–13)
NEUTS BAND NFR BLD MANUAL: 3 % (ref 0–6)
NEUTS SEG # BLD: 12.4 K/UL (ref 1.8–8)
NEUTS SEG NFR BLD: 84 % (ref 32–75)
NRBC # BLD: 0 K/UL (ref 0–0.01)
NRBC BLD-RTO: 0 PER 100 WBC
O2/TOTAL GAS SETTING VFR VENT: 65 %
PCO2 BLD: 74 MMHG (ref 35–45)
PCO2 BLD: >90 MMHG (ref 35–45)
PEEP RESPIRATORY: 8 CMH2O
PH BLD: 7.24 [PH] (ref 7.35–7.45)
PH BLD: 7.38 [PH] (ref 7.35–7.45)
PIP ISTAT,IPIP: 18
PLATELET # BLD AUTO: 272 K/UL (ref 150–400)
PMV BLD AUTO: 9.7 FL (ref 8.9–12.9)
PO2 BLD: 62 MMHG (ref 80–100)
PO2 BLD: 80 MMHG (ref 80–100)
POTASSIUM SERPL-SCNC: 3.8 MMOL/L (ref 3.5–5.1)
PROTHROMBIN TIME: 19.3 SEC (ref 9–11.1)
Q-T INTERVAL, ECG07: 392 MS
QRS DURATION, ECG06: 82 MS
QTC CALCULATION (BEZET), ECG08: 551 MS
RBC # BLD AUTO: 4.56 M/UL (ref 4.1–5.7)
RBC MORPH BLD: ABNORMAL
REPORTED DOSE,DOSE: ABNORMAL UNITS
REPORTED DOSE/TIME,TMG: ABNORMAL
SAO2 % BLD: 95 % (ref 92–97)
SERVICE CMNT-IMP: ABNORMAL
SODIUM SERPL-SCNC: 143 MMOL/L (ref 136–145)
SPECIMEN TYPE: ABNORMAL
SPECIMEN TYPE: ABNORMAL
TROPONIN I SERPL-MCNC: <0.05 NG/ML
VANCOMYCIN TROUGH SERPL-MCNC: 17.4 UG/ML (ref 5–10)
VENTRICULAR RATE, ECG03: 119 BPM
WBC # BLD AUTO: 14.2 K/UL (ref 4.1–11.1)

## 2019-12-07 PROCEDURE — 80048 BASIC METABOLIC PNL TOTAL CA: CPT

## 2019-12-07 PROCEDURE — 36600 WITHDRAWAL OF ARTERIAL BLOOD: CPT

## 2019-12-07 PROCEDURE — 74011250636 HC RX REV CODE- 250/636: Performed by: INTERNAL MEDICINE

## 2019-12-07 PROCEDURE — 74011250637 HC RX REV CODE- 250/637: Performed by: HOSPITALIST

## 2019-12-07 PROCEDURE — 36415 COLL VENOUS BLD VENIPUNCTURE: CPT

## 2019-12-07 PROCEDURE — 65660000001 HC RM ICU INTERMED STEPDOWN

## 2019-12-07 PROCEDURE — 71045 X-RAY EXAM CHEST 1 VIEW: CPT

## 2019-12-07 PROCEDURE — 93005 ELECTROCARDIOGRAM TRACING: CPT

## 2019-12-07 PROCEDURE — 5A09557 ASSISTANCE WITH RESPIRATORY VENTILATION, GREATER THAN 96 CONSECUTIVE HOURS, CONTINUOUS POSITIVE AIRWAY PRESSURE: ICD-10-PCS | Performed by: INTERNAL MEDICINE

## 2019-12-07 PROCEDURE — 82803 BLOOD GASES ANY COMBINATION: CPT

## 2019-12-07 PROCEDURE — 80202 ASSAY OF VANCOMYCIN: CPT

## 2019-12-07 PROCEDURE — 74011000250 HC RX REV CODE- 250: Performed by: INTERNAL MEDICINE

## 2019-12-07 PROCEDURE — 77010033678 HC OXYGEN DAILY

## 2019-12-07 PROCEDURE — 82962 GLUCOSE BLOOD TEST: CPT

## 2019-12-07 PROCEDURE — 77030029684 HC NEB SM VOL KT MONA -A

## 2019-12-07 PROCEDURE — 94664 DEMO&/EVAL PT USE INHALER: CPT

## 2019-12-07 PROCEDURE — 85025 COMPLETE CBC W/AUTO DIFF WBC: CPT

## 2019-12-07 PROCEDURE — 83880 ASSAY OF NATRIURETIC PEPTIDE: CPT

## 2019-12-07 PROCEDURE — 94640 AIRWAY INHALATION TREATMENT: CPT

## 2019-12-07 PROCEDURE — 85610 PROTHROMBIN TIME: CPT

## 2019-12-07 PROCEDURE — 94660 CPAP INITIATION&MGMT: CPT

## 2019-12-07 PROCEDURE — 84484 ASSAY OF TROPONIN QUANT: CPT

## 2019-12-07 PROCEDURE — 74011250636 HC RX REV CODE- 250/636

## 2019-12-07 RX ORDER — FUROSEMIDE 10 MG/ML
40 INJECTION INTRAMUSCULAR; INTRAVENOUS ONCE
Status: COMPLETED | OUTPATIENT
Start: 2019-12-07 | End: 2019-12-07

## 2019-12-07 RX ORDER — FUROSEMIDE 10 MG/ML
40 INJECTION INTRAMUSCULAR; INTRAVENOUS ONCE
Status: DISCONTINUED | OUTPATIENT
Start: 2019-12-07 | End: 2019-12-07 | Stop reason: SDUPTHER

## 2019-12-07 RX ORDER — IPRATROPIUM BROMIDE AND ALBUTEROL SULFATE 2.5; .5 MG/3ML; MG/3ML
3 SOLUTION RESPIRATORY (INHALATION)
Status: DISCONTINUED | OUTPATIENT
Start: 2019-12-07 | End: 2019-12-08

## 2019-12-07 RX ORDER — METOPROLOL TARTRATE 5 MG/5ML
2.5 INJECTION INTRAVENOUS EVERY 6 HOURS
Status: DISCONTINUED | OUTPATIENT
Start: 2019-12-07 | End: 2019-12-08

## 2019-12-07 RX ORDER — VANCOMYCIN HYDROCHLORIDE
1250 EVERY 24 HOURS
Status: DISCONTINUED | OUTPATIENT
Start: 2019-12-07 | End: 2019-12-09

## 2019-12-07 RX ORDER — METOPROLOL TARTRATE 5 MG/5ML
5 INJECTION INTRAVENOUS EVERY 6 HOURS
Status: DISCONTINUED | OUTPATIENT
Start: 2019-12-07 | End: 2019-12-07

## 2019-12-07 RX ORDER — DILTIAZEM HYDROCHLORIDE 5 MG/ML
5 INJECTION INTRAVENOUS EVERY 8 HOURS
Status: DISCONTINUED | OUTPATIENT
Start: 2019-12-07 | End: 2019-12-07

## 2019-12-07 RX ORDER — FUROSEMIDE 10 MG/ML
INJECTION INTRAMUSCULAR; INTRAVENOUS
Status: COMPLETED
Start: 2019-12-07 | End: 2019-12-07

## 2019-12-07 RX ORDER — FUROSEMIDE 10 MG/ML
40 INJECTION INTRAMUSCULAR; INTRAVENOUS EVERY 8 HOURS
Status: DISCONTINUED | OUTPATIENT
Start: 2019-12-07 | End: 2019-12-08

## 2019-12-07 RX ORDER — HALOPERIDOL 5 MG/ML
5 INJECTION INTRAMUSCULAR ONCE
Status: COMPLETED | OUTPATIENT
Start: 2019-12-07 | End: 2019-12-07

## 2019-12-07 RX ADMIN — Medication 10 ML: at 07:17

## 2019-12-07 RX ADMIN — VANCOMYCIN HYDROCHLORIDE 1250 MG: 10 INJECTION, POWDER, LYOPHILIZED, FOR SOLUTION INTRAVENOUS at 23:12

## 2019-12-07 RX ADMIN — IPRATROPIUM BROMIDE AND ALBUTEROL SULFATE 3 ML: .5; 3 SOLUTION RESPIRATORY (INHALATION) at 13:55

## 2019-12-07 RX ADMIN — FUROSEMIDE 40 MG: 10 INJECTION, SOLUTION INTRAMUSCULAR; INTRAVENOUS at 14:31

## 2019-12-07 RX ADMIN — METOPROLOL TARTRATE 2.5 MG: 5 INJECTION INTRAVENOUS at 18:03

## 2019-12-07 RX ADMIN — IPRATROPIUM BROMIDE AND ALBUTEROL SULFATE 3 ML: .5; 3 SOLUTION RESPIRATORY (INHALATION) at 19:53

## 2019-12-07 RX ADMIN — IPRATROPIUM BROMIDE AND ALBUTEROL SULFATE 3 ML: .5; 3 SOLUTION RESPIRATORY (INHALATION) at 16:26

## 2019-12-07 RX ADMIN — IPRATROPIUM BROMIDE AND ALBUTEROL SULFATE 3 ML: .5; 3 SOLUTION RESPIRATORY (INHALATION) at 23:23

## 2019-12-07 RX ADMIN — Medication 10 ML: at 14:32

## 2019-12-07 RX ADMIN — DILTIAZEM HYDROCHLORIDE 30 MG: 30 TABLET, FILM COATED ORAL at 07:16

## 2019-12-07 RX ADMIN — HALOPERIDOL LACTATE 5 MG: 5 INJECTION, SOLUTION INTRAMUSCULAR at 19:20

## 2019-12-07 RX ADMIN — METHYLPREDNISOLONE SODIUM SUCCINATE 40 MG: 40 INJECTION, POWDER, FOR SOLUTION INTRAMUSCULAR; INTRAVENOUS at 21:20

## 2019-12-07 RX ADMIN — METOPROLOL TARTRATE 2.5 MG: 5 INJECTION INTRAVENOUS at 23:11

## 2019-12-07 RX ADMIN — IPRATROPIUM BROMIDE AND ALBUTEROL SULFATE 3 ML: .5; 3 SOLUTION RESPIRATORY (INHALATION) at 08:26

## 2019-12-07 RX ADMIN — FUROSEMIDE 40 MG: 10 INJECTION, SOLUTION INTRAMUSCULAR; INTRAVENOUS at 21:19

## 2019-12-07 RX ADMIN — FUROSEMIDE 40 MG: 10 INJECTION INTRAMUSCULAR; INTRAVENOUS at 08:39

## 2019-12-07 RX ADMIN — METHYLPREDNISOLONE SODIUM SUCCINATE 40 MG: 40 INJECTION, POWDER, FOR SOLUTION INTRAMUSCULAR; INTRAVENOUS at 14:31

## 2019-12-07 RX ADMIN — Medication 10 ML: at 21:20

## 2019-12-07 RX ADMIN — BUDESONIDE 500 MCG: 0.5 INHALANT RESPIRATORY (INHALATION) at 19:53

## 2019-12-07 RX ADMIN — FUROSEMIDE 40 MG: 10 INJECTION, SOLUTION INTRAMUSCULAR; INTRAVENOUS at 08:39

## 2019-12-07 NOTE — PROGRESS NOTES
[address] [facility] Adult  Hospitalist Group                                                                                          Hospitalist Progress Note  Basilia Clay MD  Answering service: [phone] OR [phone] from in house phone        Date of Service:  2019  NAME:  Melissa Ruano  :  1935  MRN:  [id number]      Admission Summary:       CC: altered mental state        HPI: 81 y.o man w/ afib, CVA, HTN, COPD, probable dementia, nursing home resident, who presents with AMS. He was reportedly more confused than baseline during the day. He was seen by [facility] and was diagnosed with a UTI based on a urine dipstick and cellulitis of the right arm. He was sent to the ED due to the increase in confusion. His son describes that he is confused at baseline and the severity is variable, and that he is back at baseline currently. He has not been formally diagnosed with dementia. No known fever or pain. The patient denies any complaints but he is a poor historian.       Interval history / Subjective:       Patient is seen and examined at bedside. RRT this AM for respiratory distress and being unresponsive. CXR this AM showed worsening pulmonary edema. ABG showed respiratory acidosis. Started on Bipap and transferred to [address] [place] [address]:     R arm cellulitis with R elbow effusion: improving   - s/p arthrocentesis .   - XR:  Nonspecific joint effusion. No evidence of fracture or osteomyelitis  - Ortho on board: may consider MRI if needed   - blood cx negative.  Fluid cx so far negative  -mild leucocytosis 11.9  - venous duplex negative for DVT   - appreciate ID input  - c/w IV vanco.   - needs arm elevated     Acute on chronic hypoxic respiratory failure - worsening  Hx COPD on 2l home oxygen   - AB.2/ >90/ 62  - CXR: worsening pulm edema  - started on Bipap  - inhaled bronchodilators brovana/ pulmicort/ duoneb   - started on IV lasix 40mg tid  - echo, bnp ordered  - pulmonology consulted    Acute metabolic encephalopathy - improving  Underlying dementia   - CT head: No evidence of acute infarct or intracranial hemorrhage. Periventricular white matter disease is likely secondary to chronic small vessel ischemic changes. Chronic left posterior parietal infarct. - Acute agitation on 11/28 pm. Haldol x 1 dose given   - supportive care    Chronic atrial fibrillation:   - rate controlled on diltiazem, metoprolol. Pharmacy to dose coumadin   Supra therapeutic INR on poa- resolved. UTI ruled out   - this is based on an outpatient urine dipstick that showed +++ leukocyte esterase and + nitrite. - urine cx 11/27: negative     Hypokalemia - replaced     HTN: Bp stable. On lisinopril, diltiazem, metoprolol , amlodipine , lasix    History of alcohol abuse: remote, resolved per son  History of CVA: on aspirin, statin       DVT ppx: anticoagulated  Code status: DNR - son states he has a DDNR at home  Care Plan discussed with: Patient/Family  Disposition: TBD. Need SNF. Possible dc on Monday      Hospital Problems  Never Reviewed          Codes Class Noted POA    Cellulitis of right arm ICD-10-CM: L03.113  ICD-9-CM: 682.3  11/27/2019 Unknown                Review of Systems:   Review of systems not obtained due to patient factors. Vital Signs:    Last 24hrs VS reviewed since prior progress note. Most recent are:  Visit Vitals  /73 (BP 1 Location: Left arm)   Pulse 93   Temp 99.1 °F (37.3 °C)   Resp 22   Ht 5' 10\" (1.778 m)   Wt 81.6 kg (179 lb 14.3 oz)   SpO2 96%   BMI 25.81 kg/m²       No intake or output data in the 24 hours ending 12/07/19 1048     Physical Examination:             Constitutional:  No acute distress     ENT:  Oral mucous moist, oropharynx benign. Resp:  coarse breathing. CV:  Regular rhythm, normal rate, no murmurs, gallops, rubs    GI:  Soft, non distended, non tender.  normoactive bowel sounds, no hepatosplenomegaly     Musculoskeletal:  warm, 2+ pulses throughout    Neurologic:  Moves all extremities. Demented      Psych: not agitated not anxious   Skin:  rt arm , edematous and erythematous        Data Review:    Review and/or order of clinical lab test      Labs:     Recent Labs     12/07/19  0432 12/06/19  0538   WBC 14.2* 14.3*   HGB 13.9 13.4   HCT 47.6 44.9    267     Recent Labs     12/07/19  0432 12/06/19  0538 12/05/19  0400    142 145   K 3.8 3.6 3.5    101 104   CO2 41* 39* 39*   BUN 16 14 13   CREA 0.76 0.84 0.79   * 104* 116*   CA 9.9 9.7 9.8     No results for input(s): SGOT, GPT, ALT, AP, TBIL, TBILI, TP, ALB, GLOB, GGT, AML, LPSE in the last 72 hours. No lab exists for component: AMYP, HLPSE  Recent Labs     12/07/19  0432 12/06/19  1310 12/05/19  0400   INR 2.0* 2.3* 2.9*   PTP 19.3* 22.6* 27.4*      No results for input(s): FE, TIBC, PSAT, FERR in the last 72 hours. No results found for: FOL, RBCF   No results for input(s): PH, PCO2, PO2 in the last 72 hours. No results for input(s): CPK, CKNDX, TROIQ in the last 72 hours.     No lab exists for component: CPKMB  No results found for: CHOL, CHOLX, CHLST, CHOLV, HDL, HDLP, LDL, LDLC, DLDLP, TGLX, TRIGL, TRIGP, CHHD, CHHDX  Lab Results   Component Value Date/Time    Glucose (POC) 122 (H) 12/07/2019 08:28 AM     Lab Results   Component Value Date/Time    Color YELLOW/STRAW 11/27/2019 09:08 PM    Appearance CLOUDY (A) 11/27/2019 09:08 PM    Specific gravity 1.020 11/27/2019 09:08 PM    pH (UA) 6.0 11/27/2019 09:08 PM    Protein 100 (A) 11/27/2019 09:08 PM    Glucose NEGATIVE  11/27/2019 09:08 PM    Ketone TRACE (A) 11/27/2019 09:08 PM    Bilirubin NEGATIVE  11/27/2019 09:08 PM    Urobilinogen 1.0 11/27/2019 09:08 PM    Nitrites NEGATIVE  11/27/2019 09:08 PM    Leukocyte Esterase LARGE (A) 11/27/2019 09:08 PM    Epithelial cells FEW 11/27/2019 09:08 PM    Bacteria NEGATIVE  11/27/2019 09:08 PM    WBC >100 (H) 11/27/2019 09:08 PM    RBC >100 (H) 11/27/2019 09:08 PM Medications Reviewed:     Current Facility-Administered Medications   Medication Dose Route Frequency    warfarin (COUMADIN) tablet 3 mg  3 mg Oral ONCE    albuterol-ipratropium (DUO-NEB) 2.5 MG-0.5 MG/3 ML  3 mL Nebulization Q4H RT    melatonin tablet 3 mg  3 mg Oral QHS    influenza vaccine 2019-20 (6 mos+)(PF) (FLUARIX/FLULAVAL/FLUZONE QUAD) injection 0.5 mL  0.5 mL IntraMUSCular PRIOR TO DISCHARGE    Warfarin - Pharmacy to Dose   Other Rx Dosing/Monitoring    Vancomycin - Pharmacy to Dose   Other Rx Dosing/Monitoring    vancomycin (VANCOCIN) 1250 mg in  ml infusion  1,250 mg IntraVENous Q18H    lactobac ac& pc-s.therm-b.anim (HELADIO Q/RISAQUAD)  1 Cap Oral DAILY    arformoterol (BROVANA) neb solution 15 mcg  15 mcg Nebulization BID RT    And    budesonide (PULMICORT) 500 mcg/2 ml nebulizer suspension  500 mcg Nebulization BID RT    traMADol (ULTRAM) tablet 50 mg  50 mg Oral Q6H PRN    hydrALAZINE (APRESOLINE) 20 mg/mL injection 20 mg  20 mg IntraVENous Q6H PRN    lisinopril (PRINIVIL, ZESTRIL) tablet 10 mg  10 mg Oral DAILY    amLODIPine (NORVASC) tablet 5 mg  5 mg Oral DAILY    sodium chloride (NS) flush 5-40 mL  5-40 mL IntraVENous Q8H    sodium chloride (NS) flush 5-40 mL  5-40 mL IntraVENous PRN    acetaminophen (TYLENOL) tablet 650 mg  650 mg Oral Q4H PRN    ondansetron (ZOFRAN) injection 4 mg  4 mg IntraVENous Q4H PRN    aspirin delayed-release tablet 81 mg  81 mg Oral DAILY    atorvastatin (LIPITOR) tablet 40 mg  40 mg Oral DAILY    dilTIAZem (CARDIZEM) IR tablet 30 mg  30 mg Oral TIDAC    metoprolol tartrate (LOPRESSOR) tablet 50 mg  50 mg Oral BID    thiamine HCL (B-1) tablet 100 mg  100 mg Oral DAILY     ______________________________________________________________________  EXPECTED LENGTH OF STAY: - - -  ACTUAL LENGTH OF STAY:          10                 Victoria Stinson MD

## 2019-12-07 NOTE — PROGRESS NOTES
Mr. Silvia Vargas from lab called to report critical value of CO2 of 41. This nurse informed hospitalist Dr. Makenzie Valle regarding the value. Dr. Makenzie Valle stated she will look into it and call if any new orders are warranted. This nurse will continue to monitor pt. Closely.

## 2019-12-07 NOTE — PROGRESS NOTES
1130: Primary Nurse Vazquez Smith and Cash Salmon, RN performed a dual skin assessment on this patient Impairment noted- see wound doc flow sheet  Oscar score is 9    Stage II pressure injury noted to sacrum with nonblanchable redness note to right upper margins. Ecchymosis noted to left axilla      1155: Envision mattress on [employer] frame ordered.  Order #[id number]

## 2019-12-07 NOTE — PROGRESS NOTES
Patient was having rapid shallow breathing and O2 was 87%. He was lethargic and did not respond to sternal rub. RRT and MD was called. Orders for lasix, chest x ray, ABG's, Bipap, and transfer to [facility].

## 2019-12-07 NOTE — PROGRESS NOTES
0830- Responding to rapid response. Patient is lethargic. Saturations are low. NRB applied. Dr. Naman Wallis at the bedside. Lasix ordered. 9830- Results of ABG called to Dr. Naman Wallis. Orders are for Bipap and transfer to [facility].  0900- Patient tolerating bipap well. Still sleepy. 46- Monitoring continues on [address]. VSS. 1030- Monitoring continues on [address]. VSS.

## 2019-12-07 NOTE — CONSULTS
Initial Pulmonary / Critical Care Consultation    Assessment / Plan:    Acute on chronic resp failure with hypercarbia - underlying COPD (O2 dependent) with CHF in setting of afib with RVR    Metabolic encephalopathy - CO2 narcosis - improving with bipap    Afib - chronic    DDNR    --adjust bipap with decrease in pressure now that gas exchange and mental status are improving and has a very large air leak (decrease ipap 18--> 14 and EPAP 8-->6)  --diuresis - has lasix ordered tid  --nebs  --avoid sedatives    Will be available to seen on 12/8 if needed    History / Subjective:  Reason:  Respiratory failure with hypercarbia  Requesting Provider:  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 He also has no past medical history of Asthma, CAD (coronary artery disease), or Diabetes ([facility] admitted 11/27/2019 with confusion and UTI and arm cellulitis    Has a reported h/o copd  This am was more somnolent  In afib with RVR  ABG with pH 7.2 and PCO2 > 90 --> placed on bipap and moved to IMCU  CXR with pulm edema  Given diuretics  He now opens eyes to his name and nods his head  Follow up ABG with PCO2 74 and normal pH - suggesting chronic resp failure. His serum bicarb on admission was 35 again suggesting chronic hypercarbia  He is from a nursing facility  Has a DDNR    Allergies   Allergen Reactions    Seroquel [Quetiapine] Unknown (comments)     Past Medical History:   Diagnosis Date    Atrial fibrillation ([facility]     Chronic obstructive pulmonary disease ([facility]     Hypertension       History reviewed. No pertinent surgical history. Prior to Admission medications    Medication Sig Start Date End Date Taking? Authorizing Provider   aspirin delayed-release 81 mg tablet Take  by mouth daily. Yes Other, MD David   atorvastatin (LIPITOR) 40 mg tablet Take 40 mg by mouth daily.  At bedtime   Yes Other, MD David   dilTIAZem (CARDIZEM) 30 mg tablet Take 30 mg by mouth. Every 8 hours for HTN   Yes David Maki MD   furosemide (LASIX) 20 mg tablet Take 20 mg by mouth daily. Yes Shanthi, MD David   lisinopril (PRINIVIL, ZESTRIL) 5 mg tablet Take  by mouth daily. Yes Other, MD David   loratadine 10 mg cap Take 10 mg by mouth daily. Yes Shanthi, MD David   metoprolol tartrate (LOPRESSOR) 50 mg tablet Take 50 mg by mouth two (2) times a day. Yes Shanthi, MD David   guaiFENesin (MUCINEX) 1,200 mg Ta12 ER tablet Take 1,200 mg by mouth two (2) times a day. PRN cough and congestion   Yes Shanthi, MD David   thiamine HCL (B-1) 100 mg tablet Take 100 mg by mouth daily. Yes Shanthi, MD David   cyanocobalamin (VITAMIN B-12) 1,000 mcg tablet Take 1,000 mcg by mouth daily. Yes Shanthi, MD David   cholecalciferol, vitamin D3, (VITAMIN D3) 2,000 unit tab Take 5,000 Units by mouth daily. Yes Shanthi, MD David   warfarin (COUMADIN) 4 mg tablet Take 4 mg by mouth daily. Yes Shanthi, MD David   albuterol (PROVENTIL VENTOLIN) 2.5 mg /3 mL (0.083 %) nebu 2.5 mg by Nebulization route. BID for shortness of breath   Yes Shanthi, MD David   albuterol (VENTOLIN HFA) 90 mcg/actuation inhaler Take  by inhalation every four (4) hours as needed for Wheezing. Yes Shanthi, MD David   acetaminophen (TYLENOL) 325 mg tablet Take 650 mg by mouth every four (4) hours as needed for Pain. Yes Other, MD David   glycopyrrolate-formoterol (BEVESPI AEROSPHERE) 9-4.8 mcg HFAA Take 2 Puffs by inhalation two (2) times a day. Use with spacer   Yes Provider, Historical   therapeutic multivitamin (THERAGRAN) tablet Take 1 Tab by mouth daily. Yes Provider, Historical      History reviewed. No pertinent family history. Social History     Tobacco Use    Smoking status: Former Smoker    Smokeless tobacco: Never Used   Substance Use Topics    Alcohol use: Not Currently      ROS:  Review of systems not obtained due to patient factors.     Objective:  Patient Vitals for the past 4 hrs:   BP Temp Pulse Resp SpO2 Weight   19 1431 111/57  (!) 101   76.8 kg (169 lb 5 oz)   19 1355     97 %    19 1344     98 %    19 1131 117/69 98.5 °F (36.9 °C) (!) 101 26 97 %      Temp (24hrs), Av.5 °F (36.9 °C), Min:98 °F (36.7 °C), Max:99.1 °F (37.3 °C)    CVP:        No intake or output data in the 24 hours ending 19 1444  Blood Sugar:  Glucose   Date Value Ref Range Status   2019 126 (H) 65 - 100 mg/dL Final   2019 104 (H) 65 - 100 mg/dL Final   2019 116 (H) 65 - 100 mg/dL Final   2019 120 (H) 65 - 100 mg/dL Final   2019 95 65 - 100 mg/dL Final     Exam:  Opens eyes and nods  On bipap with large air leak  No accessory use  No distress  Few scattered rhonchi  irreg  Soft NT bs present  Warm and dry    Lab data was reviewed. Radiology images were independently viewed and available reports were reviewed. CXR - Pulmonary Edema with R sided pleural effusion    Lab:  Recent Labs     19  1021 19  0432 19  1310 19  0538 19  0400   WBC  --  14.2*  --  14.3* 11.9*   HGB  --  13.9  --  13.4 13.2   PLT  --  272  --  267 279   NA  --  143  --  142 145   K  --  3.8  --  3.6 3.5   CL  --  101  --  101 104   CO2  --  41*  --  39* 39*   BUN  --  16  --  14 13   CREA  --  0.76  --  0.84 0.79   GLU  --  126*  --  104* 116*   CA  --  9.9  --  9.7 9.8   INR  --  2.0* 2.3*  --  2.9*   TROIQ <0.05  --   --   --   --    Results for Serjio Joiner (MRN [id number]) as of 2019 14:45   Ref.  Range 2019 10:21   NT pro-BNP Latest Ref Range: <450 PG/ML 5,072 (H)     ABG:  Recent Labs     19  1346 19  0841   PHI 7.383 7.235*   PCO2I 74.0* >90.0*   PO2I 80 62*   HCO3I 44.1*  --    SO2I 95  --    FIO2I 65  --          Nadia Perera MD

## 2019-12-07 NOTE — PROGRESS NOTES
Pharmacist Note - Vancomycin Dosing  Therapy day 4  Indication:  RUE cellulitis   Current regimen:  1250 mg IV Q 18hrs    A Trough Level resulted at 17.4 mcg/mL which was obtained 18 hrs post-dose. Goal trough: 10 - 15 mcg/mL     Plan: Change to 1250 mg q24h . Pharmacy will continue to monitor this patient daily for changes in clinical status and renal function.

## 2019-12-07 NOTE — PROGRESS NOTES
Pharmacist Note - Warfarin Dosing  Consult provided for this 84 y.o.male to manage warfarin for Atrial Fibrillation    INR Goal: 2 - 3    Home regimen/ tablet size: 4 mg daily    Drugs that may increase INR: None  Drugs that may decrease INR: None  Other current anticoagulants/ drugs that may increase bleeding risk: Aspirin  Risk factors: Age > 65  Daily INR ordered: YES    Recent Labs     12/07/19  0432 12/06/19  1310 12/06/19  0538 12/05/19  0400   HGB 13.9  --  13.4 13.2   INR 2.0* 2.3*  --  2.9*     Date               INR                  Dose  11/27  2.7  4 mg (took PTA)   11/28  2.6  4 mg   11/29  3  2.5 mg   11/30               2.7                  3 mg  12/01               4.2                  Held  12/02  4.4  Held  -- Consult discontinued - Reconsulted 12/04 --  12/04  3.6  Hold   12/05  2.9  3 mg  12/06  2.3   2.5 mg  12/7  2.0  3 mg                                                                                 Assessment/ Plan:  Warfarin 3 mg x 1    Pharmacy will continue to monitor daily and adjust therapy as indicated. Please contact the pharmacist at  for outpatient recommendations if needed.

## 2019-12-07 NOTE — PROGRESS NOTES
Responded to RRT. Pt was being attended to by medical staff and no family present. Chaplains will continue to offer support as needed.   Chaplain Aguirre MDiv, MS, [address] ([phone])

## 2019-12-08 LAB
ANION GAP SERPL CALC-SCNC: 7 MMOL/L (ref 5–15)
BASOPHILS # BLD: 0 K/UL (ref 0–0.1)
BASOPHILS NFR BLD: 0 % (ref 0–1)
BUN SERPL-MCNC: 21 MG/DL (ref 6–20)
BUN/CREAT SERPL: 21 (ref 12–20)
CALCIUM SERPL-MCNC: 9.6 MG/DL (ref 8.5–10.1)
CHLORIDE SERPL-SCNC: 97 MMOL/L (ref 97–108)
CO2 SERPL-SCNC: 39 MMOL/L (ref 21–32)
CREAT SERPL-MCNC: 0.99 MG/DL (ref 0.7–1.3)
DIFFERENTIAL METHOD BLD: ABNORMAL
EOSINOPHIL # BLD: 0 K/UL (ref 0–0.4)
EOSINOPHIL NFR BLD: 0 % (ref 0–7)
ERYTHROCYTE [DISTWIDTH] IN BLOOD BY AUTOMATED COUNT: 12.9 % (ref 11.5–14.5)
GLUCOSE SERPL-MCNC: 134 MG/DL (ref 65–100)
HCT VFR BLD AUTO: 42.5 % (ref 36.6–50.3)
HGB BLD-MCNC: 12.6 G/DL (ref 12.1–17)
IMM GRANULOCYTES # BLD AUTO: 0.1 K/UL (ref 0–0.04)
IMM GRANULOCYTES NFR BLD AUTO: 1 % (ref 0–0.5)
INR PPP: 2.3 (ref 0.9–1.1)
LYMPHOCYTES # BLD: 0.4 K/UL (ref 0.8–3.5)
LYMPHOCYTES NFR BLD: 3 % (ref 12–49)
MCH RBC QN AUTO: 30.1 PG (ref 26–34)
MCHC RBC AUTO-ENTMCNC: 29.6 G/DL (ref 30–36.5)
MCV RBC AUTO: 101.7 FL (ref 80–99)
MONOCYTES # BLD: 0.1 K/UL (ref 0–1)
MONOCYTES NFR BLD: 1 % (ref 5–13)
NEUTS SEG # BLD: 11.3 K/UL (ref 1.8–8)
NEUTS SEG NFR BLD: 95 % (ref 32–75)
NRBC # BLD: 0 K/UL (ref 0–0.01)
NRBC BLD-RTO: 0 PER 100 WBC
PLATELET # BLD AUTO: 248 K/UL (ref 150–400)
PMV BLD AUTO: 9.8 FL (ref 8.9–12.9)
POTASSIUM SERPL-SCNC: 3.6 MMOL/L (ref 3.5–5.1)
PROTHROMBIN TIME: 22.5 SEC (ref 9–11.1)
RBC # BLD AUTO: 4.18 M/UL (ref 4.1–5.7)
RBC MORPH BLD: ABNORMAL
RBC MORPH BLD: ABNORMAL
SODIUM SERPL-SCNC: 143 MMOL/L (ref 136–145)
WBC # BLD AUTO: 11.9 K/UL (ref 4.1–11.1)

## 2019-12-08 PROCEDURE — 65660000001 HC RM ICU INTERMED STEPDOWN

## 2019-12-08 PROCEDURE — 94640 AIRWAY INHALATION TREATMENT: CPT

## 2019-12-08 PROCEDURE — 74011000250 HC RX REV CODE- 250: Performed by: INTERNAL MEDICINE

## 2019-12-08 PROCEDURE — 74011250637 HC RX REV CODE- 250/637: Performed by: INTERNAL MEDICINE

## 2019-12-08 PROCEDURE — 74011250637 HC RX REV CODE- 250/637: Performed by: HOSPITALIST

## 2019-12-08 PROCEDURE — 94664 DEMO&/EVAL PT USE INHALER: CPT

## 2019-12-08 PROCEDURE — 85610 PROTHROMBIN TIME: CPT

## 2019-12-08 PROCEDURE — 74011250636 HC RX REV CODE- 250/636: Performed by: INTERNAL MEDICINE

## 2019-12-08 PROCEDURE — 36415 COLL VENOUS BLD VENIPUNCTURE: CPT

## 2019-12-08 PROCEDURE — 85025 COMPLETE CBC W/AUTO DIFF WBC: CPT

## 2019-12-08 PROCEDURE — 80048 BASIC METABOLIC PNL TOTAL CA: CPT

## 2019-12-08 PROCEDURE — 77010033678 HC OXYGEN DAILY

## 2019-12-08 RX ORDER — FUROSEMIDE 10 MG/ML
40 INJECTION INTRAMUSCULAR; INTRAVENOUS EVERY 12 HOURS
Status: DISCONTINUED | OUTPATIENT
Start: 2019-12-08 | End: 2019-12-09

## 2019-12-08 RX ORDER — IPRATROPIUM BROMIDE AND ALBUTEROL SULFATE 2.5; .5 MG/3ML; MG/3ML
3 SOLUTION RESPIRATORY (INHALATION)
Status: DISCONTINUED | OUTPATIENT
Start: 2019-12-09 | End: 2019-12-11

## 2019-12-08 RX ORDER — DILTIAZEM HYDROCHLORIDE 30 MG/1
30 TABLET, FILM COATED ORAL EVERY 8 HOURS
Status: DISCONTINUED | OUTPATIENT
Start: 2019-12-08 | End: 2019-12-14 | Stop reason: HOSPADM

## 2019-12-08 RX ORDER — WARFARIN 2.5 MG/1
2.5 TABLET ORAL ONCE
Status: COMPLETED | OUTPATIENT
Start: 2019-12-08 | End: 2019-12-08

## 2019-12-08 RX ORDER — METOPROLOL TARTRATE 50 MG/1
50 TABLET ORAL EVERY 12 HOURS
Status: DISCONTINUED | OUTPATIENT
Start: 2019-12-08 | End: 2019-12-14 | Stop reason: HOSPADM

## 2019-12-08 RX ADMIN — METOPROLOL TARTRATE 2.5 MG: 5 INJECTION INTRAVENOUS at 05:17

## 2019-12-08 RX ADMIN — IPRATROPIUM BROMIDE AND ALBUTEROL SULFATE 3 ML: .5; 3 SOLUTION RESPIRATORY (INHALATION) at 20:26

## 2019-12-08 RX ADMIN — IPRATROPIUM BROMIDE AND ALBUTEROL SULFATE 3 ML: .5; 3 SOLUTION RESPIRATORY (INHALATION) at 04:10

## 2019-12-08 RX ADMIN — DILTIAZEM HYDROCHLORIDE 30 MG: 30 TABLET, FILM COATED ORAL at 22:49

## 2019-12-08 RX ADMIN — VANCOMYCIN HYDROCHLORIDE 1250 MG: 10 INJECTION, POWDER, LYOPHILIZED, FOR SOLUTION INTRAVENOUS at 23:18

## 2019-12-08 RX ADMIN — Medication 10 ML: at 15:12

## 2019-12-08 RX ADMIN — Medication 10 ML: at 05:16

## 2019-12-08 RX ADMIN — FUROSEMIDE 40 MG: 10 INJECTION, SOLUTION INTRAMUSCULAR; INTRAVENOUS at 21:17

## 2019-12-08 RX ADMIN — LISINOPRIL 10 MG: 10 TABLET ORAL at 08:16

## 2019-12-08 RX ADMIN — IPRATROPIUM BROMIDE AND ALBUTEROL SULFATE 3 ML: .5; 3 SOLUTION RESPIRATORY (INHALATION) at 08:50

## 2019-12-08 RX ADMIN — METHYLPREDNISOLONE SODIUM SUCCINATE 40 MG: 40 INJECTION, POWDER, FOR SOLUTION INTRAMUSCULAR; INTRAVENOUS at 22:50

## 2019-12-08 RX ADMIN — FUROSEMIDE 40 MG: 10 INJECTION, SOLUTION INTRAMUSCULAR; INTRAVENOUS at 05:30

## 2019-12-08 RX ADMIN — WARFARIN SODIUM 2.5 MG: 2.5 TABLET ORAL at 17:35

## 2019-12-08 RX ADMIN — Medication 10 ML: at 22:50

## 2019-12-08 RX ADMIN — AMLODIPINE BESYLATE 5 MG: 5 TABLET ORAL at 08:16

## 2019-12-08 RX ADMIN — METOPROLOL TARTRATE 50 MG: 50 TABLET ORAL at 09:00

## 2019-12-08 RX ADMIN — IPRATROPIUM BROMIDE AND ALBUTEROL SULFATE 3 ML: .5; 3 SOLUTION RESPIRATORY (INHALATION) at 16:45

## 2019-12-08 RX ADMIN — METHYLPREDNISOLONE SODIUM SUCCINATE 40 MG: 40 INJECTION, POWDER, FOR SOLUTION INTRAMUSCULAR; INTRAVENOUS at 15:08

## 2019-12-08 RX ADMIN — DILTIAZEM HYDROCHLORIDE 30 MG: 30 TABLET, FILM COATED ORAL at 15:08

## 2019-12-08 RX ADMIN — ATORVASTATIN CALCIUM 40 MG: 40 TABLET, FILM COATED ORAL at 08:16

## 2019-12-08 RX ADMIN — METHYLPREDNISOLONE SODIUM SUCCINATE 40 MG: 40 INJECTION, POWDER, FOR SOLUTION INTRAMUSCULAR; INTRAVENOUS at 05:16

## 2019-12-08 RX ADMIN — Medication 1 CAPSULE: at 08:16

## 2019-12-08 RX ADMIN — BUDESONIDE 500 MCG: 0.5 INHALANT RESPIRATORY (INHALATION) at 08:50

## 2019-12-08 RX ADMIN — ASPIRIN 81 MG: 81 TABLET, COATED ORAL at 08:16

## 2019-12-08 RX ADMIN — METOPROLOL TARTRATE 50 MG: 50 TABLET ORAL at 21:16

## 2019-12-08 RX ADMIN — MELATONIN 3 MG: at 22:49

## 2019-12-08 RX ADMIN — DILTIAZEM HYDROCHLORIDE 30 MG: 30 TABLET, FILM COATED ORAL at 10:05

## 2019-12-08 RX ADMIN — Medication 100 MG: at 08:16

## 2019-12-08 RX ADMIN — BUDESONIDE 500 MCG: 0.5 INHALANT RESPIRATORY (INHALATION) at 20:26

## 2019-12-08 NOTE — PROGRESS NOTES
PPt lethargic overnight, on BiPAP. Unable to perform proper education at this time d/t acute and chronic medical conditions. Problem: Pressure Injury - Risk of  Goal: *Prevention of pressure injury  Description  Document Oscar Scale and appropriate interventions in the flowsheet. 12/8/2019 0510 by Bola Kovasc  Outcome: Progressing Towards Goal  Note: Pressure Injury Interventions:  Sensory Interventions: Assess changes in LOC, Assess need for specialty bed    Moisture Interventions: Absorbent underpads, Internal/External urinary devices    Activity Interventions: Pressure redistribution bed/mattress(bed type)    Mobility Interventions: HOB 30 degrees or less, Turn and reposition approx. every two hours(pillow and wedges)    Nutrition Interventions: Discuss nutritional consult with provider, Offer support with meals,snacks and hydration    Friction and Shear Interventions: Apply protective barrier, creams and emollients, Foam dressings/transparent film/skin sealants, HOB 30 degrees or less, Lift sheet, Transfer aides:transfer board/Anny lift/ceiling lift             12/8/2019 0025 by Bola Kovacs  Outcome: Not Progressing Towards Goal  Note: Pressure Injury Interventions:  Sensory Interventions: Assess changes in LOC, Assess need for specialty bed    Moisture Interventions: Absorbent underpads, Internal/External urinary devices    Activity Interventions: Pressure redistribution bed/mattress(bed type)    Mobility Interventions: HOB 30 degrees or less, Turn and reposition approx.  every two hours(pillow and wedges)    Nutrition Interventions: Discuss nutritional consult with provider, Offer support with meals,snacks and hydration    Friction and Shear Interventions: Apply protective barrier, creams and emollients, Foam dressings/transparent film/skin sealants, HOB 30 degrees or less, Lift sheet, Transfer aides:transfer board/Anny lift/ceiling lift                Problem: Falls - Risk of  Goal: *Absence of Falls  Description  Document Jaciel Heller Fall Risk and appropriate interventions in the flowsheet.   12/8/2019 0510 by Philomena Reyes  Outcome: Progressing Towards Goal  Note: Fall Risk Interventions:  Mobility Interventions: Bed/chair exit alarm, Patient to call before getting OOB, Utilize walker, cane, or other assistive device    Mentation Interventions: Adequate sleep, hydration, pain control, Bed/chair exit alarm, Door open when patient unattended, More frequent rounding, Reorient patient    Medication Interventions: Bed/chair exit alarm, Evaluate medications/consider consulting pharmacy, Patient to call before getting OOB, Teach patient to arise slowly    Elimination Interventions: Call light in reach, Bed/chair exit alarm, Patient to call for help with toileting needs, Stay With Me (per policy), Toileting schedule/hourly rounds    History of Falls Interventions: Bed/chair exit alarm, Door open when patient unattended, Evaluate medications/consider consulting pharmacy, Room close to nurse's station      12/8/2019 0025 by Philomena Reyes  Outcome: Progressing Towards Goal  Note: Fall Risk Interventions:  Mobility Interventions: Bed/chair exit alarm, Patient to call before getting OOB, Utilize walker, cane, or other assistive device    Mentation Interventions: Adequate sleep, hydration, pain control, Bed/chair exit alarm, Door open when patient unattended, More frequent rounding, Reorient patient    Medication Interventions: Bed/chair exit alarm, Evaluate medications/consider consulting pharmacy, Patient to call before getting OOB, Teach patient to arise slowly    Elimination Interventions: Call light in reach, Bed/chair exit alarm, Patient to call for help with toileting needs, Stay With Me (per policy), Toileting schedule/hourly rounds    History of Falls Interventions: Bed/chair exit alarm, Door open when patient unattended, Evaluate medications/consider consulting pharmacy, Room close to nurse's station Problem: Patient Education: Go to Patient Education Activity  Goal: Patient/Family Education  12/8/2019 0510 by Boy Chaney  Outcome: Progressing Towards Goal  12/8/2019 0025 by Boy Chaney  Outcome: Not Progressing Towards Goal     Problem: Chronic Obstructive Pulmonary Disease (COPD)  Goal: *Oxygen saturation during activity within specified parameters  12/8/2019 0510 by Boy Chaney  Outcome: Progressing Towards Goal  12/8/2019 0025 by Boy Chaney  Outcome: Not Progressing Towards Goal

## 2019-12-08 NOTE — PROGRESS NOTES
Pharmacist Note - Warfarin Dosing  Consult provided for this 84 y.o.male to manage warfarin for Atrial Fibrillation    INR Goal: 2 - 3    Home regimen/ tablet size: 4 mg daily    Drugs that may increase INR: None  Drugs that may decrease INR: None  Other current anticoagulants/ drugs that may increase bleeding risk: Aspirin  Risk factors: Age > 65  Daily INR ordered: YES    Recent Labs     12/08/19  0340 12/08/19  0254 12/07/19  0432 12/06/19  1310 12/06/19  0538   HGB  --  12.6 13.9  --  13.4   INR 2.3*  --  2.0* 2.3*  --      Date               INR                  Dose  11/27  2.7  4 mg (took PTA)   11/28  2.6  4 mg   11/29  3  2.5 mg   11/30               2.7                  3 mg  12/01               4.2                  Held  12/02  4.4  Held  -- Consult discontinued - Reconsulted 12/04 --  12/04  3.6  Hold   12/05  2.9  3 mg  12/06  2.3   2.5 mg  12/07  2.0  3 mg  12/08  2.3  2.5 mg                                                                               Assessment/ Plan:  Warfarin 2.5 mg x 1    Pharmacy will continue to monitor daily and adjust therapy as indicated. Please contact the pharmacist at  for outpatient recommendations if needed.      Marlin Varela, PharmD  Clinical Pharmacist  [facility]  [phone]

## 2019-12-08 NOTE — PROGRESS NOTES
Problem: Pressure Injury - Risk of  Goal: *Prevention of pressure injury  Description  Document Oscar Scale and appropriate interventions in the flowsheet. Outcome: Progressing Towards Goal  Note: Pressure Injury Interventions:  Pt turned every two hours, moisture barrier applied, heels elevated, pillows and blankets used, pressure redistributing mattress, linens dry. Problem: Falls - Risk of  Goal: *Absence of Falls  Description  Document Jac Sutton Fall Risk and appropriate interventions in the flowsheet. Outcome: Progressing Towards Goal  Note: Fall Risk Interventions:  Mental status prevents appropriate knowledge of fall prevention. Bed alarm in place, turned on, and audible from the nurses station. Frequent purposeful rounding initiated by staff. 1930  Bedside shift change report given to [facility] (oncoming nurse) by Fanta Alexander (offgoing nurse). Report included the following information SBAR, Kardex, Intake/Output, MAR, Accordion, and Recent Results.

## 2019-12-08 NOTE — PROGRESS NOTES
Bedside shift change report given to Michael Ellison RN (oncoming nurse) by Luba Peck RN (offgoing nurse). Report included the following information SBAR, Intake/Output, Recent Results and Cardiac Rhythm A fib.

## 2019-12-08 NOTE — PROGRESS NOTES
Pt placed on BiPAP by RT at 2320 utlizing ordered settings. Due to concerns over pt lethargy, Sohail Art RN, present at bedside for duration of BiPAP use to ensure appropriate removal, if necessary. Pt currently resting comfortably, all VSS. NAD noted.

## 2019-12-08 NOTE — PROGRESS NOTES
[facility] Adult  Hospitalist Group                                                                                          Hospitalist Progress Note  Basilia Clay MD  Answering service: [phone] OR [phone] from in house phone        Date of Service:  2019  NAME:  Melissa Ruano  :  1935  MRN:  [id number]      Admission Summary:       CC: altered mental state        HPI: 81 y.o man w/ afib, CVA, HTN, COPD, probable dementia, nursing home resident, who presents with AMS. He was reportedly more confused than baseline during the day. He was seen by [facility] and was diagnosed with a UTI based on a urine dipstick and cellulitis of the right arm. He was sent to the ED due to the increase in confusion. His son describes that he is confused at baseline and the severity is variable, and that he is back at baseline currently. He has not been formally diagnosed with dementia. No known fever or pain. The patient denies any complaints but he is a poor historian.       Interval history / Subjective:       Patient is seen and examined at bedside. He is much better. Awake and alert and answering appropriately. His right arm and erythema much improved. Discussed with nursing. Off bipap. He was agitated last evening, 1 dose of haldol given        Assessment & Plan:     R arm cellulitis with R elbow effusion: improving   - s/p arthrocentesis .   - XR:  Nonspecific joint effusion. No evidence of fracture or osteomyelitis  - Ortho on board: may consider MRI if needed   - blood cx negative.  Fluid cx so far negative  -mild leucocytosis 11.9  - venous duplex negative for DVT   - appreciate ID input  - c/w IV vanco.   - needs arm elevated     Acute on chronic hypoxic respiratory failure - improving   Hx COPD on 2l home oxygen   - : AB.2/ >90/ 62  - CXR: worsening pulm edema  - off Bipap today AM  -  bnp 5,072  - echo pending   - appreciate pulmonology input   - inhaled bronchodilators brovana/ pulmicort/ duoneb   - c/w IV lasix 40mg bid  - saturating well on 3l NC    Acute metabolic encephalopathy - improved  Underlying dementia   - CT head: No evidence of acute infarct or intracranial hemorrhage. Periventricular white matter disease is likely secondary to chronic small vessel ischemic changes. Chronic left posterior parietal infarct. - Acute agitation on 11/28 pm. Haldol x 1 dose given   - supportive care    Chronic atrial fibrillation:   - rate controlled on diltiazem, metoprolol. Pharmacy to dose coumadin   Supra therapeutic INR on poa- resolved. UTI ruled out   - this is based on an outpatient urine dipstick that showed +++ leukocyte esterase and + nitrite. - urine cx 11/27: negative     Hypokalemia - replaced     HTN: Bp stable. On lisinopril, diltiazem, metoprolol, lasix    History of alcohol abuse: remote, resolved per son  History of CVA: on aspirin, statin       DVT ppx: anticoagulated  Code status: DNR - son states he has a DDNR at home  Care Plan discussed with: Patient/Family  Disposition: TBD. Need SNF. Possible dc on Monday      Hospital Problems  Never Reviewed          Codes Class Noted POA    Cellulitis of right arm ICD-10-CM: L03.113  ICD-9-CM: 682.3  11/27/2019 Unknown                Review of Systems:   Review of systems not obtained due to patient factors. Vital Signs:    Last 24hrs VS reviewed since prior progress note. Most recent are:  Visit Vitals  BP (!) 155/98   Pulse (!) 117   Temp 98.2 °F (36.8 °C)   Resp 28   Ht 5' 10\" (1.778 m)   Wt 83.2 kg (183 lb 6.8 oz)   SpO2 98%   BMI 26.32 kg/m²         Intake/Output Summary (Last 24 hours) at 12/8/2019 0926  Last data filed at 12/8/2019 0518  Gross per 24 hour   Intake 250 ml   Output 1675 ml   Net -1425 ml        Physical Examination:             Constitutional:  No acute distress     ENT:  Oral mucous moist, oropharynx benign. Resp:  coarse breathing.     CV:  Regular rhythm, normal rate, no murmurs, gallops, rubs    GI:  Soft, non distended, non tender. normoactive bowel sounds, no hepatosplenomegaly     Musculoskeletal:  warm, 2+ pulses throughout    Neurologic:  Moves all extremities. Demented      Psych: not agitated not anxious   Skin:  rt arm , edematous and erythematous - improving        Data Review:    Review and/or order of clinical lab test      Labs:     Recent Labs     12/08/19  0254 12/07/19  0432   WBC 11.9* 14.2*   HGB 12.6 13.9   HCT 42.5 47.6    272     Recent Labs     12/08/19  0254 12/07/19  0432 12/06/19  0538    143 142   K 3.6 3.8 3.6   CL 97 101 101   CO2 39* 41* 39*   BUN 21* 16 14   CREA 0.99 0.76 0.84   * 126* 104*   CA 9.6 9.9 9.7     No results for input(s): SGOT, GPT, ALT, AP, TBIL, TBILI, TP, ALB, GLOB, GGT, AML, LPSE in the last 72 hours. No lab exists for component: AMYP, HLPSE  Recent Labs     12/08/19  0340 12/07/19  0432 12/06/19  1310   INR 2.3* 2.0* 2.3*   PTP 22.5* 19.3* 22.6*      No results for input(s): FE, TIBC, PSAT, FERR in the last 72 hours. No results found for: FOL, RBCF   No results for input(s): PH, PCO2, PO2 in the last 72 hours.   Recent Labs     12/07/19  1021   TROIQ <0.05     No results found for: CHOL, CHOLX, CHLST, CHOLV, HDL, HDLP, LDL, LDLC, DLDLP, TGLX, TRIGL, TRIGP, CHHD, CHHDX  Lab Results   Component Value Date/Time    Glucose (POC) 122 (H) 12/07/2019 08:28 AM     Lab Results   Component Value Date/Time    Color YELLOW/STRAW 11/27/2019 09:08 PM    Appearance CLOUDY (A) 11/27/2019 09:08 PM    Specific gravity 1.020 11/27/2019 09:08 PM    pH (UA) 6.0 11/27/2019 09:08 PM    Protein 100 (A) 11/27/2019 09:08 PM    Glucose NEGATIVE  11/27/2019 09:08 PM    Ketone TRACE (A) 11/27/2019 09:08 PM    Bilirubin NEGATIVE  11/27/2019 09:08 PM    Urobilinogen 1.0 11/27/2019 09:08 PM    Nitrites NEGATIVE  11/27/2019 09:08 PM    Leukocyte Esterase LARGE (A) 11/27/2019 09:08 PM    Epithelial cells FEW 11/27/2019 09:08 PM    Bacteria NEGATIVE  11/27/2019 09:08 PM    WBC >100 (H) 11/27/2019 09:08 PM    RBC >100 (H) 11/27/2019 09:08 PM         Medications Reviewed:     Current Facility-Administered Medications   Medication Dose Route Frequency    dilTIAZem (CARDIZEM) IR tablet 30 mg  30 mg Oral Q8H    metoprolol tartrate (LOPRESSOR) tablet 50 mg  50 mg Oral Q12H    furosemide (LASIX) injection 40 mg  40 mg IntraVENous Q12H    albuterol-ipratropium (DUO-NEB) 2.5 MG-0.5 MG/3 ML  3 mL Nebulization Q4H RT    methylPREDNISolone (PF) (SOLU-MEDROL) injection 40 mg  40 mg IntraVENous Q8H    vancomycin (VANCOCIN) 1250 mg in  ml infusion  1,250 mg IntraVENous Q24H    melatonin tablet 3 mg  3 mg Oral QHS    influenza vaccine 2019-20 (6 mos+)(PF) (FLUARIX/FLULAVAL/FLUZONE QUAD) injection 0.5 mL  0.5 mL IntraMUSCular PRIOR TO DISCHARGE    Warfarin - Pharmacy to Dose   Other Rx Dosing/Monitoring    Vancomycin - Pharmacy to Dose   Other Rx Dosing/Monitoring    lactobac ac& pc-s.therm-b.anim (HELADIO Q/RISAQUAD)  1 Cap Oral DAILY    arformoterol (BROVANA) neb solution 15 mcg  15 mcg Nebulization BID RT    And    budesonide (PULMICORT) 500 mcg/2 ml nebulizer suspension  500 mcg Nebulization BID RT    traMADol (ULTRAM) tablet 50 mg  50 mg Oral Q6H PRN    hydrALAZINE (APRESOLINE) 20 mg/mL injection 20 mg  20 mg IntraVENous Q6H PRN    lisinopril (PRINIVIL, ZESTRIL) tablet 10 mg  10 mg Oral DAILY    sodium chloride (NS) flush 5-40 mL  5-40 mL IntraVENous Q8H    sodium chloride (NS) flush 5-40 mL  5-40 mL IntraVENous PRN    acetaminophen (TYLENOL) tablet 650 mg  650 mg Oral Q4H PRN    ondansetron (ZOFRAN) injection 4 mg  4 mg IntraVENous Q4H PRN    aspirin delayed-release tablet 81 mg  81 mg Oral DAILY    atorvastatin (LIPITOR) tablet 40 mg  40 mg Oral DAILY    thiamine HCL (B-1) tablet 100 mg  100 mg Oral DAILY     ______________________________________________________________________  EXPECTED LENGTH OF STAY: - - -  ACTUAL LENGTH OF STAY: [address] [facility], [place]

## 2019-12-08 NOTE — PROGRESS NOTES
Problem: Pressure Injury - Risk of  Goal: *Prevention of pressure injury  Description  Document Oscar Scale and appropriate interventions in the flowsheet. Outcome: Progressing Towards Goal  Note: Pressure Injury Interventions:  Pt turned every two hours, moisture barrier applied, heels elevated, pillows and blankets used, pressure redistributing mattress, linens dry. Problem: Falls - Risk of  Goal: *Absence of Falls  Description  Document Kaley Urbano Fall Risk and appropriate interventions in the flowsheet. Outcome: Progressing Towards Goal  Note: Fall Risk Interventions:  Mental status prevents appropriate knowledge of fall prevention. Bed alarm in place, turned on, and audible from the nurses station. Frequent purposeful rounding initiated by staff. 1930 Bedside shift change report given to Jonny (oncoming nurse) by Timur Colón (offgoing nurse). Report included the following information SBAR, Kardex, OR Summary, Procedure Summary, Intake/Output, MAR, and Accordion.

## 2019-12-09 ENCOUNTER — APPOINTMENT (OUTPATIENT)
Dept: NON INVASIVE DIAGNOSTICS | Age: 84
DRG: 602 | End: 2019-12-09
Attending: INTERNAL MEDICINE
Payer: MEDICARE

## 2019-12-09 LAB
ANION GAP SERPL CALC-SCNC: ABNORMAL MMOL/L (ref 5–15)
ANION GAP SERPL CALC-SCNC: ABNORMAL MMOL/L (ref 5–15)
ARTERIAL PATENCY WRIST A: YES
ARTERIAL PATENCY WRIST A: YES
AV VELOCITY RATIO: 0.7
BASOPHILS # BLD: 0 K/UL (ref 0–0.1)
BASOPHILS NFR BLD: 0 % (ref 0–1)
BDY SITE: ABNORMAL
BDY SITE: ABNORMAL
BUN SERPL-MCNC: 28 MG/DL (ref 6–20)
BUN SERPL-MCNC: 29 MG/DL (ref 6–20)
BUN/CREAT SERPL: 25 (ref 12–20)
BUN/CREAT SERPL: 26 (ref 12–20)
CALCIUM SERPL-MCNC: 10 MG/DL (ref 8.5–10.1)
CALCIUM SERPL-MCNC: 9.2 MG/DL (ref 8.5–10.1)
CHLORIDE SERPL-SCNC: 91 MMOL/L (ref 97–108)
CHLORIDE SERPL-SCNC: 93 MMOL/L (ref 97–108)
CO2 SERPL-SCNC: >45 MMOL/L (ref 21–32)
CO2 SERPL-SCNC: >45 MMOL/L (ref 21–32)
CREAT SERPL-MCNC: 1.09 MG/DL (ref 0.7–1.3)
CREAT SERPL-MCNC: 1.14 MG/DL (ref 0.7–1.3)
DIFFERENTIAL METHOD BLD: ABNORMAL
ECHO AO ROOT DIAM: 3.6 CM
ECHO AV AREA PEAK VELOCITY: 3.4 CM2
ECHO AV PEAK GRADIENT: 7.8 MMHG
ECHO AV PEAK VELOCITY: 139.46 CM/S
ECHO EST RA PRESSURE: 5 MMHG
ECHO LA AREA 4C: 31.6 CM2
ECHO LA MAJOR AXIS: 4.25 CM
ECHO LA TO AORTIC ROOT RATIO: 1.18
ECHO LA VOL 2C: 193.64 ML (ref 18–58)
ECHO LA VOL 4C: 105.3 ML (ref 18–58)
ECHO LA VOL BP: 149.06 ML (ref 18–58)
ECHO LA VOL/BSA BIPLANE: 75.17 ML/M2 (ref 16–28)
ECHO LA VOLUME INDEX A2C: 97.66 ML/M2 (ref 16–28)
ECHO LA VOLUME INDEX A4C: 53.1 ML/M2 (ref 16–28)
ECHO LV E' LATERAL VELOCITY: 12.31 CM/S
ECHO LV E' SEPTAL VELOCITY: 9.88 CM/S
ECHO LV INTERNAL DIMENSION DIASTOLIC: 4.25 CM (ref 4.2–5.9)
ECHO LV INTERNAL DIMENSION SYSTOLIC: 2.99 CM
ECHO LV IVSD: 1.19 CM (ref 0.6–1)
ECHO LV MASS 2D: 232.1 G (ref 88–224)
ECHO LV MASS INDEX 2D: 117.1 G/M2 (ref 49–115)
ECHO LV POSTERIOR WALL DIASTOLIC: 1.35 CM (ref 0.6–1)
ECHO LVOT DIAM: 2.49 CM
ECHO LVOT PEAK GRADIENT: 3.8 MMHG
ECHO LVOT PEAK VELOCITY: 97.71 CM/S
ECHO MV A VELOCITY: 23.45 CM/S
ECHO MV AREA PHT: 4.2 CM2
ECHO MV E DECELERATION TIME (DT): 180 MS
ECHO MV E VELOCITY: 104.33 CM/S
ECHO MV E/A RATIO: 4.45
ECHO MV E/E' LATERAL: 8.48
ECHO MV E/E' RATIO (AVERAGED): 9.52
ECHO MV E/E' SEPTAL: 10.56
ECHO MV PRESSURE HALF TIME (PHT): 52.2 MS
ECHO PULMONARY ARTERY SYSTOLIC PRESSURE (PASP): 41 MMHG
ECHO PV MAX VELOCITY: 83.91 CM/S
ECHO PV PEAK GRADIENT: 2.8 MMHG
ECHO RIGHT VENTRICULAR SYSTOLIC PRESSURE (RVSP): 41.8 MMHG
ECHO RV INTERNAL DIMENSION: 5.22 CM
ECHO RV TAPSE: 1.71 CM (ref 1.5–2)
ECHO TV REGURGITANT MAX VELOCITY: 303.36 CM/S
ECHO TV REGURGITANT PEAK GRADIENT: 36.8 MMHG
EOSINOPHIL # BLD: 0 K/UL (ref 0–0.4)
EOSINOPHIL NFR BLD: 0 % (ref 0–7)
ERYTHROCYTE [DISTWIDTH] IN BLOOD BY AUTOMATED COUNT: 12.9 % (ref 11.5–14.5)
GAS FLOW.O2 O2 DELIVERY SYS: ABNORMAL L/MIN
GAS FLOW.O2 O2 DELIVERY SYS: ABNORMAL L/MIN
GAS FLOW.O2 SETTING OXYMISER: 2 L/M
GLUCOSE SERPL-MCNC: 176 MG/DL (ref 65–100)
GLUCOSE SERPL-MCNC: 187 MG/DL (ref 65–100)
HCT VFR BLD AUTO: 41.3 % (ref 36.6–50.3)
HGB BLD-MCNC: 12.6 G/DL (ref 12.1–17)
IMM GRANULOCYTES # BLD AUTO: 0.2 K/UL (ref 0–0.04)
IMM GRANULOCYTES NFR BLD AUTO: 1 % (ref 0–0.5)
INR PPP: 2 (ref 0.9–1.1)
LVFS 2D: 29.57 %
LYMPHOCYTES # BLD: 0.6 K/UL (ref 0.8–3.5)
LYMPHOCYTES NFR BLD: 3 % (ref 12–49)
MCH RBC QN AUTO: 30.9 PG (ref 26–34)
MCHC RBC AUTO-ENTMCNC: 30.5 G/DL (ref 30–36.5)
MCV RBC AUTO: 101.2 FL (ref 80–99)
MONOCYTES # BLD: 0.4 K/UL (ref 0–1)
MONOCYTES NFR BLD: 2 % (ref 5–13)
MV DEC SLOPE: 5.8
NEUTS SEG # BLD: 17.4 K/UL (ref 1.8–8)
NEUTS SEG NFR BLD: 94 % (ref 32–75)
NRBC # BLD: 0 K/UL (ref 0–0.01)
NRBC BLD-RTO: 0 PER 100 WBC
O2/TOTAL GAS SETTING VFR VENT: 40 %
PCO2 BLD: >90 MMHG (ref 35–45)
PCO2 BLD: >90 MMHG (ref 35–45)
PEEP RESPIRATORY: 6 CMH2O
PH BLD: 7.37 [PH] (ref 7.35–7.45)
PH BLD: 7.38 [PH] (ref 7.35–7.45)
PIP ISTAT,IPIP: 14
PLATELET # BLD AUTO: 235 K/UL (ref 150–400)
PMV BLD AUTO: 9.5 FL (ref 8.9–12.9)
PO2 BLD: 103 MMHG (ref 80–100)
PO2 BLD: 93 MMHG (ref 80–100)
POTASSIUM SERPL-SCNC: 3.2 MMOL/L (ref 3.5–5.1)
POTASSIUM SERPL-SCNC: 3.4 MMOL/L (ref 3.5–5.1)
PROTHROMBIN TIME: 19.5 SEC (ref 9–11.1)
RBC # BLD AUTO: 4.08 M/UL (ref 4.1–5.7)
RBC MORPH BLD: ABNORMAL
SODIUM SERPL-SCNC: 139 MMOL/L (ref 136–145)
SODIUM SERPL-SCNC: 140 MMOL/L (ref 136–145)
SPECIMEN TYPE: ABNORMAL
SPECIMEN TYPE: ABNORMAL
TOTAL RESP. RATE, ITRR: 22
TOTAL RESP. RATE, ITRR: 29
WBC # BLD AUTO: 18.6 K/UL (ref 4.1–11.1)

## 2019-12-09 PROCEDURE — 74011000250 HC RX REV CODE- 250: Performed by: INTERNAL MEDICINE

## 2019-12-09 PROCEDURE — 82803 BLOOD GASES ANY COMBINATION: CPT

## 2019-12-09 PROCEDURE — 65660000001 HC RM ICU INTERMED STEPDOWN

## 2019-12-09 PROCEDURE — 36415 COLL VENOUS BLD VENIPUNCTURE: CPT

## 2019-12-09 PROCEDURE — 80048 BASIC METABOLIC PNL TOTAL CA: CPT

## 2019-12-09 PROCEDURE — 36600 WITHDRAWAL OF ARTERIAL BLOOD: CPT

## 2019-12-09 PROCEDURE — 93306 TTE W/DOPPLER COMPLETE: CPT

## 2019-12-09 PROCEDURE — 94664 DEMO&/EVAL PT USE INHALER: CPT

## 2019-12-09 PROCEDURE — 74011250636 HC RX REV CODE- 250/636: Performed by: INTERNAL MEDICINE

## 2019-12-09 PROCEDURE — 74011250637 HC RX REV CODE- 250/637: Performed by: INTERNAL MEDICINE

## 2019-12-09 PROCEDURE — 74011250637 HC RX REV CODE- 250/637: Performed by: HOSPITALIST

## 2019-12-09 PROCEDURE — 77030012879 HC MSK CPAP FLL FAC PHIL -B

## 2019-12-09 PROCEDURE — 85025 COMPLETE CBC W/AUTO DIFF WBC: CPT

## 2019-12-09 PROCEDURE — 85610 PROTHROMBIN TIME: CPT

## 2019-12-09 PROCEDURE — 94640 AIRWAY INHALATION TREATMENT: CPT

## 2019-12-09 RX ORDER — POTASSIUM CHLORIDE 750 MG/1
40 TABLET, FILM COATED, EXTENDED RELEASE ORAL
Status: COMPLETED | OUTPATIENT
Start: 2019-12-09 | End: 2019-12-09

## 2019-12-09 RX ORDER — DOXYCYCLINE HYCLATE 100 MG
100 TABLET ORAL EVERY 12 HOURS
Status: DISCONTINUED | OUTPATIENT
Start: 2019-12-09 | End: 2019-12-14 | Stop reason: HOSPADM

## 2019-12-09 RX ORDER — HALOPERIDOL 5 MG/ML
5 INJECTION INTRAMUSCULAR ONCE
Status: COMPLETED | OUTPATIENT
Start: 2019-12-09 | End: 2019-12-09

## 2019-12-09 RX ORDER — PREDNISONE 20 MG/1
40 TABLET ORAL
Status: DISCONTINUED | OUTPATIENT
Start: 2019-12-10 | End: 2019-12-14 | Stop reason: HOSPADM

## 2019-12-09 RX ORDER — FUROSEMIDE 40 MG/1
40 TABLET ORAL DAILY
Status: DISCONTINUED | OUTPATIENT
Start: 2019-12-09 | End: 2019-12-14 | Stop reason: HOSPADM

## 2019-12-09 RX ORDER — HALOPERIDOL 5 MG/ML
INJECTION INTRAMUSCULAR
Status: DISPENSED
Start: 2019-12-09 | End: 2019-12-10

## 2019-12-09 RX ADMIN — Medication 10 ML: at 14:54

## 2019-12-09 RX ADMIN — METOPROLOL TARTRATE 50 MG: 50 TABLET ORAL at 09:36

## 2019-12-09 RX ADMIN — Medication 100 MG: at 09:36

## 2019-12-09 RX ADMIN — IPRATROPIUM BROMIDE AND ALBUTEROL SULFATE 3 ML: .5; 3 SOLUTION RESPIRATORY (INHALATION) at 07:41

## 2019-12-09 RX ADMIN — IPRATROPIUM BROMIDE AND ALBUTEROL SULFATE 3 ML: .5; 3 SOLUTION RESPIRATORY (INHALATION) at 02:58

## 2019-12-09 RX ADMIN — FUROSEMIDE 40 MG: 40 TABLET ORAL at 09:36

## 2019-12-09 RX ADMIN — ATORVASTATIN CALCIUM 40 MG: 40 TABLET, FILM COATED ORAL at 09:36

## 2019-12-09 RX ADMIN — DOXYCYCLINE HYCLATE 100 MG: 100 TABLET, COATED ORAL at 14:53

## 2019-12-09 RX ADMIN — IPRATROPIUM BROMIDE AND ALBUTEROL SULFATE 3 ML: .5; 3 SOLUTION RESPIRATORY (INHALATION) at 14:31

## 2019-12-09 RX ADMIN — METHYLPREDNISOLONE SODIUM SUCCINATE 40 MG: 40 INJECTION, POWDER, FOR SOLUTION INTRAMUSCULAR; INTRAVENOUS at 21:46

## 2019-12-09 RX ADMIN — LISINOPRIL 10 MG: 10 TABLET ORAL at 09:36

## 2019-12-09 RX ADMIN — METHYLPREDNISOLONE SODIUM SUCCINATE 40 MG: 40 INJECTION, POWDER, FOR SOLUTION INTRAMUSCULAR; INTRAVENOUS at 06:19

## 2019-12-09 RX ADMIN — Medication 10 ML: at 21:46

## 2019-12-09 RX ADMIN — HALOPERIDOL LACTATE 5 MG: 5 INJECTION, SOLUTION INTRAMUSCULAR at 19:02

## 2019-12-09 RX ADMIN — IPRATROPIUM BROMIDE AND ALBUTEROL SULFATE 3 ML: .5; 3 SOLUTION RESPIRATORY (INHALATION) at 19:42

## 2019-12-09 RX ADMIN — BUDESONIDE 500 MCG: 0.5 INHALANT RESPIRATORY (INHALATION) at 19:41

## 2019-12-09 RX ADMIN — ASPIRIN 81 MG: 81 TABLET, COATED ORAL at 09:36

## 2019-12-09 RX ADMIN — DILTIAZEM HYDROCHLORIDE 30 MG: 30 TABLET, FILM COATED ORAL at 06:19

## 2019-12-09 RX ADMIN — POTASSIUM CHLORIDE 40 MEQ: 750 TABLET, FILM COATED, EXTENDED RELEASE ORAL at 14:53

## 2019-12-09 RX ADMIN — DILTIAZEM HYDROCHLORIDE 30 MG: 30 TABLET, FILM COATED ORAL at 14:53

## 2019-12-09 RX ADMIN — Medication 10 ML: at 06:20

## 2019-12-09 RX ADMIN — POTASSIUM CHLORIDE 40 MEQ: 750 TABLET, FILM COATED, EXTENDED RELEASE ORAL at 06:19

## 2019-12-09 RX ADMIN — BUDESONIDE 500 MCG: 0.5 INHALANT RESPIRATORY (INHALATION) at 07:41

## 2019-12-09 RX ADMIN — Medication 1 CAPSULE: at 09:36

## 2019-12-09 NOTE — PROGRESS NOTES
Pulmonary, Critical Care, and Sleep Medicine~Progress Note    Name: Ludmila Burnett MRN: [id number]   : 1935 Hospital: [facility]   Date: 2019 9:59 AM Admission: 2019     Impression Plan   1. Acute on chronic hypercapnic resp failure secondary to O2 dependent COPD and HF  2. A fib with RVR  3. Metabolic encephalopathy; secondary to CO2. Seems to be improving with NIV  4. DNR  1. Continue NIV qhs and prn at night  2. Diuretics as you are  3. He may be a candidate for trilogy   4. Duonebs/brovana/pulmicort   5. steroids. Reduce as able   6. O2 titration around 90%      Daily Progression:    Stable right now. No acute complaints     I have reviewed the labs and previous days notes. Pertinent items are noted in HPI. OBJECTIVE:     Vital Signs:       Visit Vitals  /73 (BP 1 Location: Left arm, BP Patient Position: At rest;Supine)   Pulse 99   Temp 98.4 °F (36.9 °C)   Resp 17   Ht 5' 10\" (1.778 m)   Wt 80.4 kg (177 lb 4 oz)   SpO2 100%   BMI 25.43 kg/m²      Temp (24hrs), Av.1 °F (36.7 °C), Min:97.4 °F (36.3 °C), Max:98.5 °F (36.9 °C)     Intake/Output:     Last shift: No intake/output data recorded. Last 3 shifts:  1901 -  0700  In: 56 [P.O.:240;  I.V.:250]  Out: 2875 [Urine:2875]          Intake/Output Summary (Last 24 hours) at 2019 0959  Last data filed at 2019 0300  Gross per 24 hour   Intake 240 ml   Output 1750 ml   Net -1510 ml       Physical Exam:                                        Exam Findings Other   General: No resp distress noted, appears stated age    [de-identified]:  No ulcers, JVD not elevated, no cervical LAD    Chest: No pectus deformity, normal chest rise b/l    HEART:  RRR, no murmurs/rubs/gallops    Lungs:  diminished BS at bases    ABD: Soft/NT, non rigid mildly distended    EXT: No cyanosis/clubbing/edema, normal peripheral pulses    Skin: No rashes or ulcers, no mottling Neuro: A/O        Medications:  Current Facility-Administered Medications   Medication Dose Route Frequency    furosemide (LASIX) tablet 40 mg  40 mg Oral DAILY    methylPREDNISolone (PF) (SOLU-MEDROL) injection 40 mg  40 mg IntraVENous Q12H    [START ON 12/10/2019] predniSONE (DELTASONE) tablet 40 mg  40 mg Oral DAILY WITH BREAKFAST    dilTIAZem (CARDIZEM) IR tablet 30 mg  30 mg Oral Q8H    metoprolol tartrate (LOPRESSOR) tablet 50 mg  50 mg Oral Q12H    albuterol-ipratropium (DUO-NEB) 2.5 MG-0.5 MG/3 ML  3 mL Nebulization Q6H RT    vancomycin (VANCOCIN) 1250 mg in  ml infusion  1,250 mg IntraVENous Q24H    melatonin tablet 3 mg  3 mg Oral QHS    influenza vaccine 2019-20 (6 mos+)(PF) (FLUARIX/FLULAVAL/FLUZONE QUAD) injection 0.5 mL  0.5 mL IntraMUSCular PRIOR TO DISCHARGE    Warfarin - Pharmacy to Dose   Other Rx Dosing/Monitoring    Vancomycin - Pharmacy to Dose   Other Rx Dosing/Monitoring    lactobac ac& pc-s.therm-b.anim (HELADIO Q/RISAQUAD)  1 Cap Oral DAILY    arformoterol (BROVANA) neb solution 15 mcg  15 mcg Nebulization BID RT    And    budesonide (PULMICORT) 500 mcg/2 ml nebulizer suspension  500 mcg Nebulization BID RT    traMADol (ULTRAM) tablet 50 mg  50 mg Oral Q6H PRN    hydrALAZINE (APRESOLINE) 20 mg/mL injection 20 mg  20 mg IntraVENous Q6H PRN    lisinopril (PRINIVIL, ZESTRIL) tablet 10 mg  10 mg Oral DAILY    sodium chloride (NS) flush 5-40 mL  5-40 mL IntraVENous Q8H    sodium chloride (NS) flush 5-40 mL  5-40 mL IntraVENous PRN    acetaminophen (TYLENOL) tablet 650 mg  650 mg Oral Q4H PRN    ondansetron (ZOFRAN) injection 4 mg  4 mg IntraVENous Q4H PRN    aspirin delayed-release tablet 81 mg  81 mg Oral DAILY    atorvastatin (LIPITOR) tablet 40 mg  40 mg Oral DAILY    thiamine HCL (B-1) tablet 100 mg  100 mg Oral DAILY       Labs:  ABG Recent Labs     12/07/19  1346 12/07/19  0841   PHI 7.383 7.235*   PCO2I 74.0* >90.0*   PO2I 80 62*   HCO3I 44.1*  --    SO2I 95 --    FIO2I 65  --         CBC Recent Labs     12/09/19  0311 12/08/19  0254 12/07/19  0432   WBC 18.6* 11.9* 14.2*   HGB 12.6 12.6 13.9   HCT 41.3 42.5 47.6    248 272   .2* 101.7* 104.4*   MCH 30.9 30.1 56.2        Metabolic  Panel Recent Labs     12/09/19  0311 12/08/19  0340 12/08/19  0254 12/07/19  0432     --  143 143   K 3.2*  --  3.6 3.8   CL 93*  --  97 101   CO2 >45*  --  39* 41*   *  --  134* 126*   BUN 28*  --  21* 16   CREA 1.09  --  0.99 0.76   CA 9.2  --  9.6 9.9   INR 2.0* 2.3*  --  2.0*        Pertinent Labs                Reid Grier PA-C  12/9/2019

## 2019-12-09 NOTE — PROGRESS NOTES
Completed chart review and discussed patient with nursing who reports patient recently being placed on BiPap. Will hold PT and continue to follow as medically stable.        Thank you,    Corey Mccormack, PT, DPT

## 2019-12-09 NOTE — PROGRESS NOTES
Pharmacist Note - Warfarin Dosing  Consult provided for this 84 y.o.male to manage warfarin for Atrial Fibrillation    INR Goal: 2 - 3    Home regimen/ tablet size: 4 mg daily    Drugs that may increase INR: Doxycycline  Drugs that may decrease INR: None  Other current anticoagulants/ drugs that may increase bleeding risk: Aspirin  Risk factors: Age > 65  Daily INR ordered: YES    Recent Labs     12/09/19  0311 12/08/19  0340 12/08/19  0254 12/07/19  0432   HGB 12.6  --  12.6 13.9   INR 2.0* 2.3*  --  2.0*     Date               INR                  Dose  11/27  2.7  4 mg (took PTA)   11/28  2.6  4 mg   11/29  3  2.5 mg   11/30               2.7                  3 mg  12/01               4.2                  Held  12/02  4.4  Held  -- Consult discontinued - Reconsulted 12/04 --  12/04  3.6  Hold   12/05  2.9  3 mg  12/06  2.3   2.5 mg  12/07  2.0  3 mg  12/08  2.3  2.5 mg  12/9                2                      3 mg                                                                               Assessment/ Plan:  Warfarin 3 mg PO x 1    Pharmacy will continue to monitor daily and adjust therapy as indicated. Please contact the pharmacist at  for outpatient recommendations if needed.      Cely Felipe, BonnieD, BCPS

## 2019-12-09 NOTE — PROGRESS NOTES
[facility] Adult  Hospitalist Group                                                                                          Hospitalist Progress Note  Shreyas Garner MD  Answering service: [phone] OR [phone] from in house phone        Date of Service:  2019  NAME:  Natalia Root  :  1935  MRN:  [id number]      Admission Summary:       CC: altered mental state        HPI: 81 y.o man w/ afib, CVA, HTN, COPD, probable dementia, nursing home resident, who presents with AMS. He was reportedly more confused than baseline during the day. He was seen by [facility] and was diagnosed with a UTI based on a urine dipstick and cellulitis of the right arm. He was sent to the ED due to the increase in confusion. His son describes that he is confused at baseline and the severity is variable, and that he is back at baseline currently. He has not been formally diagnosed with dementia. No known fever or pain. The patient denies any complaints but he is a poor historian.       Interval history / Subjective:       Patient is seen and examined at bedside. He is awake and alert, confused ( baseline). No complaints  Discussed with nursing. Blood work showed elevated HCo3 >45, ABG showed PCO2 >90. Started on bipap. Since patient is awake this time, he keeps trying to take off. Assessment & Plan:     R arm cellulitis with R elbow effusion: improved  - s/p arthrocentesis .   - XR:  Nonspecific joint effusion. No evidence of fracture or osteomyelitis  - Ortho on board: may consider MRI if needed   - blood cx negative. Fluid cx so far negative  - leucocytosis worsened today likely due to steroids   - venous duplex negative for DVT   - appreciate ID input  -  Dced IV vanco today.  Started on po Doxy x 5 days   - needs arm elevated     Acute on chronic hypoxic respiratory failure - improving but CO2 worsened   Hx COPD on 2l home oxygen   - : AB.2/ >90/ 62  - CXR: worsening pulm edema  -  bnp 7,0  - echo report pending   - appreciate pulmonology input   - inhaled bronchodilators brovana/ pulmicort/ duoneb   - on IV steroids - weaning down  - changed to po lasix 40mg daily ( home dose 20)  - ABG worsened again today PCO2 >90. HCO3 >45  - started back on bipap  - not a candidate for trilogy as he has dementia    Acute metabolic encephalopathy - improved  Underlying dementia   - CT head: No evidence of acute infarct or intracranial hemorrhage. Periventricular white matter disease is likely secondary to chronic small vessel ischemic changes. Chronic left posterior parietal infarct. - Acute agitation on 11/28 pm s/p haldol   - supportive care    Chronic atrial fibrillation:   - rate controlled on diltiazem, metoprolol. Pharmacy to dose coumadin   Supra therapeutic INR on poa- resolved. UTI ruled out   - this is based on an outpatient urine dipstick that showed +++ leukocyte esterase and + nitrite. - urine cx 11/27: negative     Hypokalemia - replaced     HTN: Bp stable. On lisinopril, diltiazem, metoprolol, lasix    History of alcohol abuse: remote, resolved per son  History of CVA: on aspirin, statin       DVT ppx: anticoagulated  Code status: DNR - son states he has a DDNR at home  Care Plan discussed with: Patient/Family  Disposition: TBD. Need SNF. Possible dc in 1-2 days when breathing improves      Hospital Problems  Never Reviewed          Codes Class Noted POA    Cellulitis of right arm ICD-10-CM: L03.113  ICD-9-CM: 682.3  11/27/2019 Unknown                Review of Systems:   Review of systems not obtained due to patient factors. Vital Signs:    Last 24hrs VS reviewed since prior progress note.  Most recent are:  Visit Vitals  /58 (BP 1 Location: Left arm, BP Patient Position: At rest;Supine)   Pulse 94   Temp 98.3 °F (36.8 °C)   Resp 30   Ht 5' 10\" (1.778 m)   Wt 80.4 kg (177 lb 4 oz)   SpO2 96%   BMI 25.43 kg/m²         Intake/Output Summary (Last 24 hours) at 12/9/2019 Cain Benedict 89 filed at 12/9/2019 0300  Gross per 24 hour   Intake 240 ml   Output 1750 ml   Net -1510 ml        Physical Examination:             Constitutional:  No acute distress     ENT:  Oral mucous moist, oropharynx benign. Resp:  coarse breathing. CV:  Regular rhythm, normal rate, no murmurs, gallops, rubs    GI:  Soft, non distended, non tender. normoactive bowel sounds, no hepatosplenomegaly     Musculoskeletal:  warm, 2+ pulses throughout    Neurologic:  Moves all extremities. Demented      Psych: not agitated not anxious   Skin:  rt arm , mild erythematous - improving        Data Review:    Review and/or order of clinical lab test      Labs:     Recent Labs     12/09/19  0311 12/08/19  0254   WBC 18.6* 11.9*   HGB 12.6 12.6   HCT 41.3 42.5    248     Recent Labs     12/09/19  1003 12/09/19  0311 12/08/19  0254    140 143   K 3.4* 3.2* 3.6   CL 91* 93* 97   CO2 >45* >45* 39*   BUN 29* 28* 21*   CREA 1.14 1.09 0.99   * 176* 134*   CA 10.0 9.2 9.6     No results for input(s): SGOT, GPT, ALT, AP, TBIL, TBILI, TP, ALB, GLOB, GGT, AML, LPSE in the last 72 hours. No lab exists for component: AMYP, HLPSE  Recent Labs     12/09/19  0311 12/08/19  0340 12/07/19  0432   INR 2.0* 2.3* 2.0*   PTP 19.5* 22.5* 19.3*      No results for input(s): FE, TIBC, PSAT, FERR in the last 72 hours. No results found for: FOL, RBCF   No results for input(s): PH, PCO2, PO2 in the last 72 hours.   Recent Labs     12/07/19  1021   TROIQ <0.05     No results found for: CHOL, CHOLX, CHLST, CHOLV, HDL, HDLP, LDL, LDLC, DLDLP, TGLX, TRIGL, TRIGP, CHHD, CHHDX  Lab Results   Component Value Date/Time    Glucose (POC) 122 (H) 12/07/2019 08:28 AM     Lab Results   Component Value Date/Time    Color YELLOW/STRAW 11/27/2019 09:08 PM    Appearance CLOUDY (A) 11/27/2019 09:08 PM    Specific gravity 1.020 11/27/2019 09:08 PM    pH (UA) 6.0 11/27/2019 09:08 PM    Protein 100 (A) 11/27/2019 09:08 PM    Glucose NEGATIVE 11/27/2019 09:08 PM    Ketone TRACE (A) 11/27/2019 09:08 PM    Bilirubin NEGATIVE  11/27/2019 09:08 PM    Urobilinogen 1.0 11/27/2019 09:08 PM    Nitrites NEGATIVE  11/27/2019 09:08 PM    Leukocyte Esterase LARGE (A) 11/27/2019 09:08 PM    Epithelial cells FEW 11/27/2019 09:08 PM    Bacteria NEGATIVE  11/27/2019 09:08 PM    WBC >100 (H) 11/27/2019 09:08 PM    RBC >100 (H) 11/27/2019 09:08 PM         Medications Reviewed:     Current Facility-Administered Medications   Medication Dose Route Frequency    furosemide (LASIX) tablet 40 mg  40 mg Oral DAILY    methylPREDNISolone (PF) (SOLU-MEDROL) injection 40 mg  40 mg IntraVENous Q12H    [START ON 12/10/2019] predniSONE (DELTASONE) tablet 40 mg  40 mg Oral DAILY WITH BREAKFAST    dilTIAZem (CARDIZEM) IR tablet 30 mg  30 mg Oral Q8H    metoprolol tartrate (LOPRESSOR) tablet 50 mg  50 mg Oral Q12H    albuterol-ipratropium (DUO-NEB) 2.5 MG-0.5 MG/3 ML  3 mL Nebulization Q6H RT    vancomycin (VANCOCIN) 1250 mg in  ml infusion  1,250 mg IntraVENous Q24H    melatonin tablet 3 mg  3 mg Oral QHS    influenza vaccine 2019-20 (6 mos+)(PF) (FLUARIX/FLULAVAL/FLUZONE QUAD) injection 0.5 mL  0.5 mL IntraMUSCular PRIOR TO DISCHARGE    Warfarin - Pharmacy to Dose   Other Rx Dosing/Monitoring    Vancomycin - Pharmacy to Dose   Other Rx Dosing/Monitoring    lactobac ac& pc-s.therm-b.anim (HELADIO Q/RISAQUAD)  1 Cap Oral DAILY    arformoterol (BROVANA) neb solution 15 mcg  15 mcg Nebulization BID RT    And    budesonide (PULMICORT) 500 mcg/2 ml nebulizer suspension  500 mcg Nebulization BID RT    traMADol (ULTRAM) tablet 50 mg  50 mg Oral Q6H PRN    hydrALAZINE (APRESOLINE) 20 mg/mL injection 20 mg  20 mg IntraVENous Q6H PRN    lisinopril (PRINIVIL, ZESTRIL) tablet 10 mg  10 mg Oral DAILY    sodium chloride (NS) flush 5-40 mL  5-40 mL IntraVENous Q8H    sodium chloride (NS) flush 5-40 mL  5-40 mL IntraVENous PRN    acetaminophen (TYLENOL) tablet 650 mg  650 mg Oral Q4H PRN    ondansetron (ZOFRAN) injection 4 mg  4 mg IntraVENous Q4H PRN    aspirin delayed-release tablet 81 mg  81 mg Oral DAILY    atorvastatin (LIPITOR) tablet 40 mg  40 mg Oral DAILY    thiamine HCL (B-1) tablet 100 mg  100 mg Oral DAILY     ______________________________________________________________________  EXPECTED LENGTH OF STAY: 4d 16h  ACTUAL LENGTH OF STAY:          12                 Yuni Segovia MD

## 2019-12-09 NOTE — PROGRESS NOTES
BELLA    1. [facility] pending medically stablility. CM confirmed with Raquel Jaramillo at [facility] and [place] [address] patient has a bed available-Call report to [phone]/fax medicals to [phone]. CM followed up with [facility] [phone]. Patient regarding insurance authorization. Patient update provided. [facility]#[id number] start date changed to 12/9/19 with next review on 12/11/19. Patient authorized for PT/OT-TI, Speech-SH, Nursing-PDE1, and Non-therapy Ancillary-NE. Prerna Lee MS    4:00 pm   CM provided update to Raquel Jaramillo at [facility] that patient will not be discharging today. Anticipate discharge in 1-2 days. Patient will need updated PT and OT notes for auth. CM to monitor.     Prerna Lee MS

## 2019-12-09 NOTE — PROGRESS NOTES
Bedside shift change report given to Maikol Burnett RN (oncoming nurse) by Jeff Medina RN (offgoing nurse). Report included the following information SBAR, Kardex, ED Summary, Procedure Summary, Intake/Output, MAR, Accordion, Recent Results, Med Rec Status, Cardiac Rhythm A fib and Alarm Parameters . Problem: Pressure Injury - Risk of  Goal: *Prevention of pressure injury  Description  Document Oscar Scale and appropriate interventions in the flowsheet.   Outcome: Progressing Towards Goal  Note: Pressure Injury Interventions:  Sensory Interventions: Float heels, Keep linens dry and wrinkle-free    Moisture Interventions: Absorbent underpads, Apply protective barrier, creams and emollients, Internal/External urinary devices    Activity Interventions: Pressure redistribution bed/mattress(bed type)    Mobility Interventions: Float heels, HOB 30 degrees or less, Pressure redistribution bed/mattress (bed type)    Nutrition Interventions: Document food/fluid/supplement intake    Friction and Shear Interventions: Apply protective barrier, creams and emollients, Foam dressings/transparent film/skin sealants, HOB 30 degrees or less       Problem: Chronic Obstructive Pulmonary Disease (COPD)  Goal: *Oxygen saturation during activity within specified parameters  Outcome: Progressing Towards Goal  Goal: *Able to remain out of bed as prescribed  Outcome: Not Progressing Towards Goal  Goal: *Absence of hypoxia  Outcome: Progressing Towards Goal  Goal: *Optimize nutritional status  Outcome: Progressing Towards Goal

## 2019-12-09 NOTE — PROGRESS NOTES
Occupational Therapy    Completed chart review and discussed patient with nursing who reports patient recently being placed on BiPap. Will hold OT and continue to follow as medically stable.        Thank you,    Noel Alvarado, OT

## 2019-12-09 NOTE — PROGRESS NOTES
Problem: Pressure Injury - Risk of  Goal: *Prevention of pressure injury  Description  Document Oscar Scale and appropriate interventions in the flowsheet. 12-9-19: turn q2h and float heels   Outcome: Progressing Towards Goal  Note: Pressure Injury Interventions:  Sensory Interventions: Assess changes in LOC, Check visual cues for pain, Discuss PT/OT consult with provider, Keep linens dry and wrinkle-free, Minimize linen layers, Pressure redistribution bed/mattress (bed type), Turn and reposition approx. every two hours (pillows and wedges if needed)    Moisture Interventions: Absorbent underpads, Apply protective barrier, creams and emollients, Check for incontinence Q2 hours and as needed, Internal/External urinary devices, Maintain skin hydration (lotion/cream), Minimize layers, Moisture barrier    Activity Interventions: Pressure redistribution bed/mattress(bed type), PT/OT evaluation    Mobility Interventions: HOB 30 degrees or less, Pressure redistribution bed/mattress (bed type), PT/OT evaluation, Turn and reposition approx. every two hours(pillow and wedges)    Nutrition Interventions: Document food/fluid/supplement intake, Offer support with meals,snacks and hydration    Friction and Shear Interventions: Apply protective barrier, creams and emollients, HOB 30 degrees or less, Lift sheet, Lift team/patient mobility team, Minimize layers                Problem: Falls - Risk of  Goal: *Absence of Falls  Description  Document Vickii Bridges Fall Risk and appropriate interventions in the flowsheet.   Outcome: Progressing Towards Goal  Note: Fall Risk Interventions:  Mobility Interventions: Communicate number of staff needed for ambulation/transfer, Patient to call before getting OOB, OT consult for ADLs, PT Consult for mobility concerns, PT Consult for assist device competence    Mentation Interventions: Adequate sleep, hydration, pain control, Door open when patient unattended, Evaluate medications/consider consulting pharmacy, Increase mobility, More frequent rounding, Reorient patient, Room close to nurse's station, Toileting rounds, Update white board    Medication Interventions: Evaluate medications/consider consulting pharmacy, Patient to call before getting OOB, Teach patient to arise slowly    Elimination Interventions: Call light in reach, Patient to call for help with toileting needs, Toilet paper/wipes in reach, Toileting schedule/hourly rounds    History of Falls Interventions: Bed/chair exit alarm, Investigate reason for fall         Problem: Chronic Obstructive Pulmonary Disease (COPD)  Goal: *Absence of hypoxia  Outcome: Progressing Towards Goal  Note:   O2 saturations above 94% while on 2L NC (baseline). Blood work indicates CO2 >45 and >90. Cont. BiPAP placed on pt to help reduce CO2 level in the blood. FiO2 weaned from 50% to 40%. Breath sounds diminished bilaterally. Repeat ABG ordered for 4 hours after pt has been wearing BiPAP to reassess blood gases. Orders for BiPAP at night placed. Will continue to monitor.

## 2019-12-09 NOTE — WOUND CARE
WOCN Note:  
 
New consult placed by RN for: cellulitis right arm with UTI. Chart shows: [place] bilaterally:  
Admitted for: cellulitis right arm and UTI. history of : cellulitis right arm and UTI. WBC = 18.6 On = 12-9-19 Admitted from : home. Assessment:  
Patient is alert but not oriented, communicative, incontinent/ condom cath on. Not mobile and need assistance of 2 to lift patient up in the bed. Patient wearing briefs for incontinence. Bed: Total Care Diet: Cardiac Regular. Patient reports no pain. Bilateral heels, buttocks  skin intact and without erythema. Heels offloaded on pillow. 
-sacrum: 0.8cm x 0.7cm x 0.1cm: secondary to MASD  And pink sacrum documented on admission. Dry wound surrounding skin intact and dry now. Recommendations: 
Sacrum: daily apply liquid marathon and Z_Guard Barrier Cream for bottom and sacrum. Minimize layers of linen/pads under patient to optimize support surface. Turn/reposition approximately every 2 hours and offload heels Santos Octleonid Manage incontinence / promote continence; Z-Guard Barrier Cream to buttocks and sacrum daily and as needed with incontinence care. Specialty bed: Syl[facility] Vickers on a [facility]. Discussed above plan with patient and RN: Jennifer Chu Transition of Care: Plan to follow weekly and  as needed while admitted to hospital.   
 
David PUCKETT RN Wound Care Department Office: [phone] Pager: [phone]

## 2019-12-09 NOTE — PROGRESS NOTES
Infectious Disease Progress Note          HPI:    Mr Dylon Crawford seen earlier today  Much more awake   Denied pain   Not much verbal but overall more verbal and awake then seen last week   He had CO2 retention and treaated with bipap and now on steroids as well   WBC up without fever           Medications:    Current Facility-Administered Medications:     furosemide (LASIX) tablet 40 mg, 40 mg, Oral, DAILY, Madala, Sushma, MD, 40 mg at 12/09/19 0936    methylPREDNISolone (PF) (SOLU-MEDROL) injection 40 mg, 40 mg, IntraVENous, Q12H, Madala, Sushma, MD    [START ON 12/10/2019] predniSONE (DELTASONE) tablet 40 mg, 40 mg, Oral, DAILY WITH BREAKFAST, Madala, Sushma, MD    dilTIAZem (CARDIZEM) IR tablet 30 mg, 30 mg, Oral, Q8H, Madala, Sushma, MD, 30 mg at 12/09/19 2400    metoprolol tartrate (LOPRESSOR) tablet 50 mg, 50 mg, Oral, Q12H, Madala, Sushma, MD, 50 mg at 12/09/19 0936    albuterol-ipratropium (DUO-NEB) 2.5 MG-0.5 MG/3 ML, 3 mL, Nebulization, Q6H RT, Madala, Sushma, MD, 3 mL at 12/09/19 0741    [COMPLETED] vancomycin (VANCOCIN) 2000 mg in  ml infusion, 2,000 mg, IntraVENous, ONCE, Last Rate: 250 mL/hr at 12/04/19 1732, 2,000 mg at 12/04/19 1732 **FOLLOWED BY** vancomycin (VANCOCIN) 1250 mg in  ml infusion, 1,250 mg, IntraVENous, Q24H, Yokasta Drummond DO, Last Rate: 125 mL/hr at 12/08/19 2318, 1,250 mg at 12/08/19 2318    melatonin tablet 3 mg, 3 mg, Oral, QHS, Madala, Sushma, MD, 3 mg at 12/08/19 2249    influenza vaccine 2019-20 (6 mos+)(PF) (FLUARIX/FLULAVAL/FLUZONE QUAD) injection 0.5 mL, 0.5 mL, IntraMUSCular, PRIOR TO DISCHARGE, Juju Beltran MD    Warfarin - Pharmacy to Dose, , Other, Rx Dosing/Monitoring, Michelle Chanel MD    Vancomycin - Pharmacy to Dose, , Other, Rx Dosing/Monitoring, Rosy Drummond DO    lactobac ac& pc-s.therm-b.anim (HELADIO Q/RISAQUAD), 1 Cap, Oral, DAILY, Nitesh Griggs MD, 1 Cap at 12/09/19 0936    arformoterol (BROVANA) neb solution 15 mcg, 15 mcg, Nebulization, BID RT, 15 mcg at 12/06/19 2028 **AND** budesonide (PULMICORT) 500 mcg/2 ml nebulizer suspension, 500 mcg, Nebulization, BID RT, Salbador Muller MD, 500 mcg at 12/09/19 0741    traMADol (ULTRAM) tablet 50 mg, 50 mg, Oral, Q6H PRN, Ariel Martines MD, 50 mg at 12/05/19 0156    hydrALAZINE (APRESOLINE) 20 mg/mL injection 20 mg, 20 mg, IntraVENous, Q6H PRN, Salbador Muller MD    lisinopril (PRINIVIL, ZESTRIL) tablet 10 mg, 10 mg, Oral, DAILY, Salbador Muller MD, 10 mg at 12/09/19 0936    sodium chloride (NS) flush 5-40 mL, 5-40 mL, IntraVENous, Q8H, America Salmon MD, 10 mL at 12/09/19 0620    sodium chloride (NS) flush 5-40 mL, 5-40 mL, IntraVENous, PRN, America Salmon MD    acetaminophen (TYLENOL) tablet 650 mg, 650 mg, Oral, Q4H PRN, Ariel Martines MD, 650 mg at 11/27/19 2150    ondansetron (ZOFRAN) injection 4 mg, 4 mg, IntraVENous, Q4H PRN, America Salmon MD    aspirin delayed-release tablet 81 mg, 81 mg, Oral, DAILY, America Salmon MD, 81 mg at 12/09/19 0936    atorvastatin (LIPITOR) tablet 40 mg, 40 mg, Oral, DAILY, America Salmon MD, 40 mg at 12/09/19 0936    thiamine HCL (B-1) tablet 100 mg, 100 mg, Oral, DAILY, America Salmon MD, 100 mg at 12/09/19 0936          Physical Exam:    Vitals:   Patient Vitals for the past 24 hrs:   Temp Pulse Resp BP SpO2   12/09/19 1107 98.3 °F (36.8 °C) 94 30 106/58 96 %   12/09/19 0805 98.4 °F (36.9 °C) 99 17 127/73 100 %   12/09/19 0741     98 %   12/09/19 0619  99  133/72    12/09/19 0300 98.4 °F (36.9 °C) 95 26 134/77 100 %   12/09/19 0259     100 %   12/08/19 2319 98.2 °F (36.8 °C) 91 25 121/68 99 %   12/08/19 2249  (!) 103  121/66    12/08/19 2116  98  121/57    12/08/19 2028     100 %   12/08/19 1919 98.5 °F (36.9 °C) (!) 107 22 110/48 100 %   12/08/19 1645     97 %   12/08/19 1617 97.4 °F (36.3 °C) 92 27 123/64 99 %   12/08/19 1508  99  126/52      · GEN: NAD  · HEENT:  No scleral icterus, no thrush   · CV: S1, S2 heard regularly,  · Lungs: diminished sounds, no wheezing   · Abdomen: soft, non distended, nonfacial grimace to palpation   · Extremities: no edema  lower extremities  ·  skin: no rash  Musculoskeletal: No facial grimace to palpation of the right elbow, erythema markedly improved RUE, edema much improved as well       Labs:   Recent Results (from the past 24 hour(s))   PROTHROMBIN TIME + INR    Collection Time: 12/09/19  3:11 AM   Result Value Ref Range    INR 2.0 (H) 0.9 - 1.1      Prothrombin time 19.5 (H) 9.0 - 11.1 sec   CBC WITH AUTOMATED DIFF    Collection Time: 12/09/19  3:11 AM   Result Value Ref Range    WBC 18.6 (H) 4.1 - 11.1 K/uL    RBC 4.08 (L) 4.10 - 5.70 M/uL    HGB 12.6 12.1 - 17.0 g/dL    HCT 41.3 36.6 - 50.3 %    .2 (H) 80.0 - 99.0 FL    MCH 30.9 26.0 - 34.0 PG    MCHC 30.5 30.0 - 36.5 g/dL    RDW 12.9 11.5 - 14.5 %    PLATELET 183 430 - 209 K/uL    MPV 9.5 8.9 - 12.9 FL    NRBC 0.0 0  WBC    ABSOLUTE NRBC 0.00 0.00 - 0.01 K/uL    NEUTROPHILS 94 (H) 32 - 75 %    LYMPHOCYTES 3 (L) 12 - 49 %    MONOCYTES 2 (L) 5 - 13 %    EOSINOPHILS 0 0 - 7 %    BASOPHILS 0 0 - 1 %    IMMATURE GRANULOCYTES 1 (H) 0.0 - 0.5 %    ABS. NEUTROPHILS 17.4 (H) 1.8 - 8.0 K/UL    ABS. LYMPHOCYTES 0.6 (L) 0.8 - 3.5 K/UL    ABS. MONOCYTES 0.4 0.0 - 1.0 K/UL    ABS. EOSINOPHILS 0.0 0.0 - 0.4 K/UL    ABS. BASOPHILS 0.0 0.0 - 0.1 K/UL    ABS. IMM.  GRANS. 0.2 (H) 0.00 - 0.04 K/UL    DF SMEAR SCANNED      RBC COMMENTS HYPOCHROMIA  1+        RBC COMMENTS MACROCYTOSIS  1+        RBC COMMENTS MICROCYTOSIS  1+        RBC COMMENTS ANISOCYTOSIS  2+       METABOLIC PANEL, BASIC    Collection Time: 12/09/19  3:11 AM   Result Value Ref Range    Sodium 140 136 - 145 mmol/L    Potassium 3.2 (L) 3.5 - 5.1 mmol/L    Chloride 93 (L) 97 - 108 mmol/L    CO2 >45 (HH) 21 - 32 mmol/L    Anion gap Cannot be calculated 5 - 15 mmol/L    Glucose 176 (H) 65 - 100 mg/dL    BUN 28 (H) 6 - 20 MG/DL    Creatinine 1.09 0.70 - 1.30 MG/DL    BUN/Creatinine ratio 26 (H) 12 - 20      GFR est AA >60 >60 ml/min/1.73m2    GFR est non-AA >60 >60 ml/min/1.73m2    Calcium 9.2 8.5 - 00.4 MG/DL   METABOLIC PANEL, BASIC    Collection Time: 12/09/19 10:03 AM   Result Value Ref Range    Sodium 139 136 - 145 mmol/L    Potassium 3.4 (L) 3.5 - 5.1 mmol/L    Chloride 91 (L) 97 - 108 mmol/L    CO2 >45 (HH) 21 - 32 mmol/L    Anion gap Cannot be calculated 5 - 15 mmol/L    Glucose 187 (H) 65 - 100 mg/dL    BUN 29 (H) 6 - 20 MG/DL    Creatinine 1.14 0.70 - 1.30 MG/DL    BUN/Creatinine ratio 25 (H) 12 - 20      GFR est AA >60 >60 ml/min/1.73m2    GFR est non-AA >60 >60 ml/min/1.73m2    Calcium 10.0 8.5 - 10.1 MG/DL   ECHO ADULT COMPLETE    Collection Time: 12/09/19 10:26 AM   Result Value Ref Range    LA Volume 149.06 18 - 58 mL    LV E' Lateral Velocity 12.31 cm/s    LV E' Septal Velocity 9.88 cm/s    Tapse 1.71 1.5 - 2.0 cm    Ao Root D 3.60 cm    Aortic Valve Systolic Peak Velocity 275.38 cm/s    Aortic Valve Area by Continuity of Peak Velocity 3.4 cm2    AoV PG 7.8 mmHg    LVIDd 4.25 4.2 - 5.9 cm    LVPWd 1.35 (A) 0.6 - 1.0 cm    LVIDs 2.99 cm    IVSd 1.19 (A) 0.6 - 1.0 cm    LVOT d 2.49 cm    LVOT Peak Velocity 97.71 cm/s    LVOT Peak Gradient 3.8 mmHg    MVA (PHT) 4.2 cm2    MV A Adin 23.45 cm/s    MV E Adin 104.33 cm/s    MV E/A 4.40     Left Atrium to Aortic Root Ratio 1.18     RVIDd 5.22 cm    LA Vol 4C 105.30 (A) 18 - 58 mL    LA Vol 2C 193.64 (A) 18 - 58 mL    LA Area 4C 31.6 cm2    LV Mass .1 (A) 88 - 224 g    LV Mass AL Index 117.1 49 - 115 g/m2    E/E' lateral 8.48     E/E' septal 10.56     RVSP 41.8 mmHg    E/E' ratio (averaged) 9.52     Est. RA Pressure 5.0 mmHg    Mitral Valve E Wave Deceleration Time 180.0 ms    Mitral Valve Pressure Half-time 52.2 ms    Left Atrium Major Axis 4.25 cm    Triscuspid Valve Regurgitation Peak Gradient 36.8 mmHg    Pulmonic Valve Max Velocity 83.91 cm/s    TR Max Velocity 303.36 cm/s    LA Vol Index 75.17 16 - 28 ml/m2    PASP 41.8 mmHg    LA Vol Index 97.66 16 - 28 ml/m2    LA Vol Index 53.10 16 - 28 ml/m2    Left Ventricular Fractional Shortening by 2D 62.10895840 %    Mitral Valve Deceleration [place] [id number]     AV Velocity Ratio 0.70     PV peak gradient 2.8 mmHg       Microbiology Data:       Blood: 11/27/19      Component Value Ref Range & Units Status   Special Requests: NO SPECIAL REQUESTS    Final   Culture result: NO GROWTH 5 DAYS    Final   Result History               Synovial/Joint fluid: 11/27/19      Urine 11/27/19  Component Value Ref Range & Units Status   Special Requests: NO SPECIAL REQUESTS    Final   [place] Count <10,000   COLONIES/mL    Final   Culture result: NO SIGNIFICANT GROWTH    Final   Result History   Specimen Information: Joint Fluid        Component Value Ref Range & Units Status   Special Requests: NO SPECIAL REQUESTS    Preliminary   GRAM STAIN 1+ WBCS SEEN    Preliminary   GRAM STAIN NO ORGANISMS SEEN    Preliminary   Culture result:    Preliminary   Culture performed on Unspun Fluid    Culture result:    Preliminary   No growth thus far, holding 14 days. Result History             Imaging:   CT Head 11/27/19  IMPRESSION  Impression:   1. No evidence of acute infarct or intracranial hemorrhage. 2. Periventricular white matter disease is likely secondary to chronic small  vessel ischemic changes. 3. Chronic left posterior parietal infarct. CXR 11/27/19   FINDINGS: PA and lateral views of the chest demonstrate a stable  cardiomediastinal silhouette and no focal airspace disease. There are small  bilateral pleural effusions. . The visualized osseous structures are  unremarkable.     IMPRESSION  IMPRESSION: Small bilateral pleural effusions.     Xray R elbow  11/27/19   FINDINGS: Three views of the right elbow demonstrate antecubital intravenous  access. Elbow joint effusion is moderate. Bones are osteopenic. No acute  fracture or evidence of osteomyelitis.  Mild ulnotrochlear osteoarthritis.     IMPRESSION  IMPRESSION:      Nonspecific joint effusion. No evidence of fracture or osteomyelitis. Right Upper Venous  12/4/19    No evidence of acute DVT and chronic DVT. The internal jugular, subclavian, axillary, brachial prox, brachial mid, brachial dist, cephalic upper arm and basilic upper arm vein(s) were visualized in the transverse and longitudinal views. The vessels showed normal color filling and compressibility. Doppler interrogation showed phasic and spontaneous flow. Procedures:   11/27/19  R elbow US guided aspiration : aspirated 3 cc cloudy fluid     Assessment / Plan:     Mr. Fadi De Leon is an 81-year-old gentleman with hypertension, atrial fibrillation, COPD per the chart  admitted on 11/27/2019 with change in mental status and found to have right upper extremity cellulitis. He status post aspiration of the right elbow with cultures that are negative. No crystals seen in the fluid . Blood cultures negative . He continues to have cellulitis of the right upper extremity and edema.     1) right upper extremity cellulitis and edema  Unclear of any insect or animal bite, no clear puncture wound  except one spot that is likely where he had ultrasound-guided aspiration   Synovial fluid with 1+ WBCs and no growth on culture  Synovial fluid with 19,880 nucleated cells , 98% neutrophils   S/P Cefepime stopped 12/6/19  Stop IV Vancomycin 12/9/19  Markedly improved exam RUE   Continue elevation as much as able   He has already been on broad antibiotics > 1 week   Doxycycline 100 mg bid for 5 more days given very faint erythema on some dependent areas and risk for recurrent infection   Monitor for adverse effects, diarrhea, dysphagia , avoid sun exposure with doxycycline   WBC increased but also on steroids         2) atrial fibrillation per chart  Noted on warfarin  Antibiotics can interfere with anticoagulants and need close INR monitoring per primary team's and pharmacy    3) hypertension     4) COPD per primary and pulmonary teams       5) DVT prophylaxis       I will sign off at this time. Thank for the opportunity to participate in the care of this patient. Please contact with questions or concerns.        Yokasta Drummond,   12:05 PM

## 2019-12-09 NOTE — PROGRESS NOTES
NUTRITION    Pt seen for:   Length of stay; nursing referral        RECOMMENDATIONS:   1. Document meal % in flowsheets  2. RD to add Ensure High Protein with all meals    SUBJECTIVE/OBJECTIVE:   Information obtained from:   EHR, rounds           Diet:  Cardiac  Supplements: none at this time  Intake: fair    81 y.o. male admitted with Cellulitis of right arm [L03.113]    Nutrition screening referral was triggered for pressure injury. Wound care nurse note reviewed and no stageable areas noted at this time - \"Bilateral heels, buttocks  skin intact and without erythema. Heels offloaded on pillow.  -sacrum: 0.8cm x 0.7cm x 0.1cm: secondary to MASD and pink sacrum documented on admission. Dry wound surrounding skin intact and dry now. \"  Therefore, full nutrition assessment is not indicated at this time. However, pt due for length of stay screen. Fair PO per nursing, however just recently came to this unit. Has probable underlying dementia per MD.  PO intake possibly variable and unreliable. Will add supplement with meals until PO intake deemed adequate. Pt  has a past medical history of Atrial fibrillation ([address] Chronic obstructive pulmonary disease ([address] and Hypertension. He also has no past medical history of Asthma, CAD (coronary artery disease), or Diabetes ([address] Recent Labs     12/09/19  1003 12/09/19  0311 12/08/19  0254 12/07/19  0432   * 176* 134* 126*   BUN 29* 28* 21* 16   CREA 1.14 1.09 0.99 0.76    140 143 143   K 3.4* 3.2* 3.6 3.8   CL 91* 93* 97 101   CO2 >45* >45* 39* 41*   CA 10.0 9.2 9.6 9.9       Recent Labs     12/09/19  0311 12/08/19  0254   WBC 18.6* 11.9*   HGB 12.6 12.6   HCT 41.3 42.5    248       Recent Labs     12/07/19  0828   GLUCPOC 122*         Weight Changes:    Wt Readings from Last 10 Encounters:   12/08/19 80.4 kg (177 lb 4 oz)   Admission weight = 81.6 kg    Nutrition Problems Identified:  none    PLAN:   Add supplement, follow    Rescreen: weekly     Adrienne Ram RD

## 2019-12-10 LAB
ANION GAP SERPL CALC-SCNC: ABNORMAL MMOL/L (ref 5–15)
ARTERIAL PATENCY WRIST A: ABNORMAL
ARTERIAL PATENCY WRIST A: YES
BASE EXCESS BLD CALC-SCNC: >30 MMOL/L
BASOPHILS # BLD: 0 K/UL (ref 0–0.1)
BASOPHILS NFR BLD: 0 % (ref 0–1)
BDY SITE: ABNORMAL
BDY SITE: ABNORMAL
BUN SERPL-MCNC: 36 MG/DL (ref 6–20)
BUN/CREAT SERPL: 36 (ref 12–20)
CALCIUM SERPL-MCNC: 9.6 MG/DL (ref 8.5–10.1)
CHLORIDE SERPL-SCNC: 94 MMOL/L (ref 97–108)
CO2 SERPL-SCNC: >45 MMOL/L (ref 21–32)
CREAT SERPL-MCNC: 0.99 MG/DL (ref 0.7–1.3)
DIFFERENTIAL METHOD BLD: ABNORMAL
EOSINOPHIL # BLD: 0 K/UL (ref 0–0.4)
EOSINOPHIL NFR BLD: 0 % (ref 0–7)
ERYTHROCYTE [DISTWIDTH] IN BLOOD BY AUTOMATED COUNT: 13.2 % (ref 11.5–14.5)
GAS FLOW.O2 O2 DELIVERY SYS: ABNORMAL L/MIN
GAS FLOW.O2 O2 DELIVERY SYS: ABNORMAL L/MIN
GAS FLOW.O2 SETTING OXYMISER: 2.5 L/M
GLUCOSE SERPL-MCNC: 153 MG/DL (ref 65–100)
HCO3 BLD-SCNC: 58.5 MMOL/L (ref 22–26)
HCT VFR BLD AUTO: 39.3 % (ref 36.6–50.3)
HGB BLD-MCNC: 11.7 G/DL (ref 12.1–17)
IMM GRANULOCYTES # BLD AUTO: 0.2 K/UL (ref 0–0.04)
IMM GRANULOCYTES NFR BLD AUTO: 1 % (ref 0–0.5)
INR PPP: 1.6 (ref 0.9–1.1)
LYMPHOCYTES # BLD: 0.3 K/UL (ref 0.8–3.5)
LYMPHOCYTES NFR BLD: 2 % (ref 12–49)
MCH RBC QN AUTO: 30.2 PG (ref 26–34)
MCHC RBC AUTO-ENTMCNC: 29.8 G/DL (ref 30–36.5)
MCV RBC AUTO: 101.3 FL (ref 80–99)
MONOCYTES # BLD: 0.3 K/UL (ref 0–1)
MONOCYTES NFR BLD: 2 % (ref 5–13)
NEUTS SEG # BLD: 16.1 K/UL (ref 1.8–8)
NEUTS SEG NFR BLD: 95 % (ref 32–75)
NRBC # BLD: 0 K/UL (ref 0–0.01)
NRBC BLD-RTO: 0 PER 100 WBC
PCO2 BLD: 84.1 MMHG (ref 35–45)
PCO2 BLD: >90 MMHG (ref 35–45)
PEEP RESPIRATORY: 6 CMH2O
PH BLD: 7.38 [PH] (ref 7.35–7.45)
PH BLD: 7.45 [PH] (ref 7.35–7.45)
PIP ISTAT,IPIP: 16
PLATELET # BLD AUTO: 232 K/UL (ref 150–400)
PMV BLD AUTO: 10 FL (ref 8.9–12.9)
PO2 BLD: 63 MMHG (ref 80–100)
PO2 BLD: 90 MMHG (ref 80–100)
POTASSIUM SERPL-SCNC: 3.6 MMOL/L (ref 3.5–5.1)
PROTHROMBIN TIME: 15.6 SEC (ref 9–11.1)
RBC # BLD AUTO: 3.88 M/UL (ref 4.1–5.7)
RBC MORPH BLD: ABNORMAL
SAO2 % BLD: 97 % (ref 92–97)
SODIUM SERPL-SCNC: 143 MMOL/L (ref 136–145)
SPECIMEN TYPE: ABNORMAL
SPECIMEN TYPE: ABNORMAL
TOTAL RESP. RATE, ITRR: 26
WBC # BLD AUTO: 16.9 K/UL (ref 4.1–11.1)

## 2019-12-10 PROCEDURE — 74011250637 HC RX REV CODE- 250/637: Performed by: HOSPITALIST

## 2019-12-10 PROCEDURE — 94640 AIRWAY INHALATION TREATMENT: CPT

## 2019-12-10 PROCEDURE — 65660000001 HC RM ICU INTERMED STEPDOWN

## 2019-12-10 PROCEDURE — 36415 COLL VENOUS BLD VENIPUNCTURE: CPT

## 2019-12-10 PROCEDURE — 74011250637 HC RX REV CODE- 250/637: Performed by: INTERNAL MEDICINE

## 2019-12-10 PROCEDURE — 94664 DEMO&/EVAL PT USE INHALER: CPT

## 2019-12-10 PROCEDURE — 80048 BASIC METABOLIC PNL TOTAL CA: CPT

## 2019-12-10 PROCEDURE — 97530 THERAPEUTIC ACTIVITIES: CPT

## 2019-12-10 PROCEDURE — 36600 WITHDRAWAL OF ARTERIAL BLOOD: CPT

## 2019-12-10 PROCEDURE — 85610 PROTHROMBIN TIME: CPT

## 2019-12-10 PROCEDURE — 94660 CPAP INITIATION&MGMT: CPT

## 2019-12-10 PROCEDURE — 74011636637 HC RX REV CODE- 636/637: Performed by: INTERNAL MEDICINE

## 2019-12-10 PROCEDURE — 74011000250 HC RX REV CODE- 250: Performed by: INTERNAL MEDICINE

## 2019-12-10 PROCEDURE — 82803 BLOOD GASES ANY COMBINATION: CPT

## 2019-12-10 PROCEDURE — 85025 COMPLETE CBC W/AUTO DIFF WBC: CPT

## 2019-12-10 RX ORDER — WARFARIN 4 MG/1
4 TABLET ORAL ONCE
Status: COMPLETED | OUTPATIENT
Start: 2019-12-10 | End: 2019-12-10

## 2019-12-10 RX ADMIN — Medication 10 ML: at 20:23

## 2019-12-10 RX ADMIN — MELATONIN 3 MG: at 20:21

## 2019-12-10 RX ADMIN — DILTIAZEM HYDROCHLORIDE 30 MG: 30 TABLET, FILM COATED ORAL at 13:58

## 2019-12-10 RX ADMIN — Medication 1 CAPSULE: at 09:23

## 2019-12-10 RX ADMIN — LISINOPRIL 10 MG: 10 TABLET ORAL at 09:23

## 2019-12-10 RX ADMIN — Medication 10 ML: at 06:20

## 2019-12-10 RX ADMIN — Medication 10 ML: at 14:00

## 2019-12-10 RX ADMIN — WARFARIN SODIUM 4 MG: 4 TABLET ORAL at 17:35

## 2019-12-10 RX ADMIN — DILTIAZEM HYDROCHLORIDE 30 MG: 30 TABLET, FILM COATED ORAL at 20:21

## 2019-12-10 RX ADMIN — IPRATROPIUM BROMIDE AND ALBUTEROL SULFATE 3 ML: .5; 3 SOLUTION RESPIRATORY (INHALATION) at 14:55

## 2019-12-10 RX ADMIN — ASPIRIN 81 MG: 81 TABLET, COATED ORAL at 09:23

## 2019-12-10 RX ADMIN — IPRATROPIUM BROMIDE AND ALBUTEROL SULFATE 3 ML: .5; 3 SOLUTION RESPIRATORY (INHALATION) at 21:05

## 2019-12-10 RX ADMIN — ATORVASTATIN CALCIUM 40 MG: 40 TABLET, FILM COATED ORAL at 09:23

## 2019-12-10 RX ADMIN — DOXYCYCLINE HYCLATE 100 MG: 100 TABLET, COATED ORAL at 09:23

## 2019-12-10 RX ADMIN — DOXYCYCLINE HYCLATE 100 MG: 100 TABLET, COATED ORAL at 20:21

## 2019-12-10 RX ADMIN — FUROSEMIDE 40 MG: 40 TABLET ORAL at 09:23

## 2019-12-10 RX ADMIN — METOPROLOL TARTRATE 50 MG: 50 TABLET ORAL at 09:23

## 2019-12-10 RX ADMIN — METOPROLOL TARTRATE 50 MG: 50 TABLET ORAL at 20:21

## 2019-12-10 RX ADMIN — IPRATROPIUM BROMIDE AND ALBUTEROL SULFATE 3 ML: .5; 3 SOLUTION RESPIRATORY (INHALATION) at 03:37

## 2019-12-10 RX ADMIN — BUDESONIDE 500 MCG: 0.5 INHALANT RESPIRATORY (INHALATION) at 21:05

## 2019-12-10 RX ADMIN — PREDNISONE 40 MG: 20 TABLET ORAL at 09:23

## 2019-12-10 RX ADMIN — BUDESONIDE 500 MCG: 0.5 INHALANT RESPIRATORY (INHALATION) at 08:45

## 2019-12-10 RX ADMIN — Medication 100 MG: at 09:23

## 2019-12-10 RX ADMIN — IPRATROPIUM BROMIDE AND ALBUTEROL SULFATE 3 ML: .5; 3 SOLUTION RESPIRATORY (INHALATION) at 08:45

## 2019-12-10 NOTE — PROGRESS NOTES
Bedside shift change report given to Amanda Vasquez RN (oncoming nurse) by Myron Seo RN (offgoing nurse). Report included the following information SBAR, Kardex, Intake/Output, MAR, Recent Results and Cardiac Rhythm A-Fib.

## 2019-12-10 NOTE — PROGRESS NOTES
[facility]  Hospitalist Group                                                                                          Hospitalist Progress Note  Deep Beltran MD  Answering service: [phone] OR 1950 from in house phone        Date of Service:  12/10/2019  NAME:  Cj Randolph  :  1935  MRN:  [id number]      Admission Summary:       CC: altered mental state        HPI: 81 y.o man w/ afib, CVA, HTN, COPD, probable dementia, nursing home resident, who presents with AMS. He was reportedly more confused than baseline during the day. He was seen by [facility] and was diagnosed with a UTI based on a urine dipstick and cellulitis of the right arm. He was sent to the ED due to the increase in confusion. His son describes that he is confused at baseline and the severity is variable, and that he is back at baseline currently. He has not been formally diagnosed with dementia. No known fever or pain. The patient denies any complaints but he is a poor historian.       Interval history / Subjective:       Patient is seen and examined at bedside. He is awake and alert, confused ( baseline). \appears that he is oriented to self at baseline. Cont to have hypercapnia but pH is better. Afebrile  HR controlled  DW nursing staff     Assessment & Plan:     R arm cellulitis with R elbow effusion: improved  - s/p arthrocentesis .   - XR:  Nonspecific joint effusion. No evidence of fracture or osteomyelitis  - Ortho consult noted  - blood cx negative. Fluid cx so far negative  - leucocytosis worsened today likely due to steroids   - venous duplex negative for DVT   - appreciate ID input  - stopped cefepime , stopped Vanc 12/10.  Started on po Doxy x 5 days   - needs arm elevated     Acute on chronic hypoxic respiratory failure - improving but CO2 stays high  Hx COPD on 2l home oxygen   - : AB.2/ >90/ 62  - CXR: worsening pulm edema  -  bnp 5,072  - echo shows normal LVEF  - appreciate pulmonology input   - inhaled bronchodilators brovana/ pulmicort/ duoneb   - on IV steroids - change to PO steroids 12/10  - changed to po lasix 40mg daily ( home dose 20)  - ABG worsening, resume BiPAP at night. - not a candidate for trilogy as he has dementia  - Poor prognosis, agree with pulm, will consult Palliative care team to help clarify further goals of care. Acute metabolic encephalopathy - improved  Underlying dementia   - CT head: No evidence of acute infarct or intracranial hemorrhage. Periventricular white matter disease is likely secondary to chronic small vessel ischemic changes. Chronic left posterior parietal infarct. - Acute agitation on 11/28 pm s/p haldol   - supportive care    Chronic atrial fibrillation:   - rate controlled on diltiazem, metoprolol. Pharmacy to dose coumadin, INR daily    Supra therapeutic INR on poa- resolved. UTI ruled out   - this is based on an outpatient urine dipstick that showed +++ leukocyte esterase and + nitrite. - urine cx 11/27: negative     Hypokalemia - replaced     HTN: Bp stable. On lisinopril, diltiazem, metoprolol, lasix    History of alcohol abuse: remote, resolved per son  History of CVA: on aspirin, statin       DVT ppx: anticoagulated on coumaidn  Code status: DNR - son states he has a DDNR at home  Care Plan discussed with: Patient/Family  Disposition: TBD. Need SNF. Possible dc in 1-2 days when breathing improves      Hospital Problems  Never Reviewed          Codes Class Noted POA    Cellulitis of right arm ICD-10-CM: L03.113  ICD-9-CM: 682.3  11/27/2019 Unknown                Review of Systems:   Review of systems not obtained due to patient factors. Vital Signs:    Last 24hrs VS reviewed since prior progress note.  Most recent are:  Visit Vitals  /85 (BP 1 Location: Left arm, BP Patient Position: Supine;Pre-activity)   Pulse 95   Temp 98.3 °F (36.8 °C)   Resp 27   Ht 5' 10\" (1.778 m)   Wt 81.9 kg (180 lb 8 oz)   SpO2 92%   BMI 25.90 kg/m²         Intake/Output Summary (Last 24 hours) at 12/10/2019 1404  Last data filed at 12/9/2019 1937  Gross per 24 hour   Intake    Output 775 ml   Net -775 ml        Physical Examination:             Constitutional:  No acute distress, confused, calm    ENT:  Oral mucous moist, oropharynx benign. Resp:  coarse breathing. No wheezing or rales   CV:  Regular rhythm, normal rate, no murmurs, gallops, rubs    GI:  Soft, non distended, non tender. Normoactive BS    Musculoskeletal:  warm, 2+ pulses throughout    Neurologic:  Moves all extremities. Demented      Psych: not agitated not anxious   Skin:  rt arm , mild erythematous - improving        Data Review:    Review and/or order of clinical lab test      Labs:     Recent Labs     12/10/19  0257 12/09/19  0311   WBC 16.9* 18.6*   HGB 11.7* 12.6   HCT 39.3 41.3    235     Recent Labs     12/10/19  0257 12/09/19  1003 12/09/19 0311    139 140   K 3.6 3.4* 3.2*   CL 94* 91* 93*   CO2 >45* >45* >45*   BUN 36* 29* 28*   CREA 0.99 1.14 1.09   * 187* 176*   CA 9.6 10.0 9.2     No results for input(s): SGOT, GPT, ALT, AP, TBIL, TBILI, TP, ALB, GLOB, GGT, AML, LPSE in the last 72 hours. No lab exists for component: AMYP, HLPSE  Recent Labs     12/10/19  0257 12/09/19  0311 12/08/19  0340   INR 1.6* 2.0* 2.3*   PTP 15.6* 19.5* 22.5*      No results for input(s): FE, TIBC, PSAT, FERR in the last 72 hours. No results found for: FOL, RBCF   No results for input(s): PH, PCO2, PO2 in the last 72 hours. No results for input(s): CPK, CKNDX, TROIQ in the last 72 hours.     No lab exists for component: CPKMB  No results found for: CHOL, CHOLX, CHLST, CHOLV, HDL, HDLP, LDL, LDLC, DLDLP, TGLX, TRIGL, TRIGP, CHHD, CHHDX  Lab Results   Component Value Date/Time    Glucose (POC) 122 (H) 12/07/2019 08:28 AM     Lab Results   Component Value Date/Time    Color YELLOW/STRAW 11/27/2019 09:08 PM    Appearance CLOUDY (A) 11/27/2019 09:08 PM    Specific gravity 1.020 11/27/2019 09:08 PM    pH (UA) 6.0 11/27/2019 09:08 PM    Protein 100 (A) 11/27/2019 09:08 PM    Glucose NEGATIVE  11/27/2019 09:08 PM    Ketone TRACE (A) 11/27/2019 09:08 PM    Bilirubin NEGATIVE  11/27/2019 09:08 PM    Urobilinogen 1.0 11/27/2019 09:08 PM    Nitrites NEGATIVE  11/27/2019 09:08 PM    Leukocyte Esterase LARGE (A) 11/27/2019 09:08 PM    Epithelial cells FEW 11/27/2019 09:08 PM    Bacteria NEGATIVE  11/27/2019 09:08 PM    WBC >100 (H) 11/27/2019 09:08 PM    RBC >100 (H) 11/27/2019 09:08 PM         Medications Reviewed:     Current Facility-Administered Medications   Medication Dose Route Frequency    furosemide (LASIX) tablet 40 mg  40 mg Oral DAILY    predniSONE (DELTASONE) tablet 40 mg  40 mg Oral DAILY WITH BREAKFAST    doxycycline (VIBRA-TABS) tablet 100 mg  100 mg Oral Q12H    dilTIAZem (CARDIZEM) IR tablet 30 mg  30 mg Oral Q8H    metoprolol tartrate (LOPRESSOR) tablet 50 mg  50 mg Oral Q12H    albuterol-ipratropium (DUO-NEB) 2.5 MG-0.5 MG/3 ML  3 mL Nebulization Q6H RT    melatonin tablet 3 mg  3 mg Oral QHS    influenza vaccine 2019-20 (6 mos+)(PF) (FLUARIX/FLULAVAL/FLUZONE QUAD) injection 0.5 mL  0.5 mL IntraMUSCular PRIOR TO DISCHARGE    Warfarin - Pharmacy to Dose   Other Rx Dosing/Monitoring    lactobac ac& pc-s.therm-b.anim (HELADIO Q/RISAQUAD)  1 Cap Oral DAILY    arformoterol (BROVANA) neb solution 15 mcg  15 mcg Nebulization BID RT    And    budesonide (PULMICORT) 500 mcg/2 ml nebulizer suspension  500 mcg Nebulization BID RT    traMADol (ULTRAM) tablet 50 mg  50 mg Oral Q6H PRN    hydrALAZINE (APRESOLINE) 20 mg/mL injection 20 mg  20 mg IntraVENous Q6H PRN    lisinopril (PRINIVIL, ZESTRIL) tablet 10 mg  10 mg Oral DAILY    sodium chloride (NS) flush 5-40 mL  5-40 mL IntraVENous Q8H    sodium chloride (NS) flush 5-40 mL  5-40 mL IntraVENous PRN    acetaminophen (TYLENOL) tablet 650 mg  650 mg Oral Q4H PRN    ondansetron (ZOFRAN) injection 4 mg  4 mg IntraVENous Q4H PRN    aspirin delayed-release tablet 81 mg  81 mg Oral DAILY    atorvastatin (LIPITOR) tablet 40 mg  40 mg Oral DAILY    thiamine HCL (B-1) tablet 100 mg  100 mg Oral DAILY     ______________________________________________________________________  EXPECTED LENGTH OF STAY: 4d 16h  ACTUAL LENGTH OF STAY:          13                 Hilario Lagunas MD

## 2019-12-10 NOTE — PROGRESS NOTES
Pulmonary, Critical Care, and Sleep Medicine~Progress Note    Name: Melissa Ruano MRN: [id number]   : 1935 Hospital: [facility]   Date: 12/10/2019 9:59 AM Admission: 2019     Impression Plan   1. Acute on chronic hypercapnic resp failure secondary to O2 dependent COPD and HF  2. A fib with RVR  3. Metabolic encephalopathy; secondary to CO2. Seems to be improving with NIV  4. Dementia   5. DNR  1. Continue NIV qhs and prn at night  2. Diuretics as you are  3. Hospitalist does not think he is a candidate for trilogy   4. On doxy   5. Duonebs/brovana/pulmicort   6. steroids. Reduce as able   7. Warfarin   8. O2 titration around 90%   9. Hypercapnia will be an ongoing issue. Suggest palliative care involvement     Daily Progression:    On NIV, asleep    Did receive haldol for agitation and confusion yesterday, said confusion likely induced by hypercapnia + baseline dementia     I have reviewed the labs and previous days notes. Pertinent items are noted in HPI. OBJECTIVE:     Vital Signs:       Visit Vitals  /73   Pulse (!) 105   Temp 98.2 °F (36.8 °C)   Resp 16   Ht 5' 10\" (1.778 m)   Wt 81.9 kg (180 lb 8 oz)   SpO2 100%   BMI 25.90 kg/m²      Temp (24hrs), Av.4 °F (36.9 °C), Min:98.2 °F (36.8 °C), Max:98.7 °F (37.1 °C)     Intake/Output:     Last shift: No intake/output data recorded.     Last 3 shifts:  1901 - 12/10 0700  In: 240 [P.O.:240]  Out: 1825 [Urine:1825]          Intake/Output Summary (Last 24 hours) at 12/10/2019 1013  Last data filed at 2019 1937  Gross per 24 hour   Intake    Output 775 ml   Net -775 ml       Physical Exam:                                        Exam Findings Other   General: No resp distress noted, appears stated age    HEENT:  No ulcers, JVD not elevated, no cervical LAD    Chest: No pectus deformity, normal chest rise b/l    HEART:  RRR, no murmurs/rubs/gallops Lungs:  diminished BS at bases    ABD: Soft/NT, non rigid mildly distended    EXT: No cyanosis/clubbing/edema, normal peripheral pulses    Skin: No rashes or ulcers, no mottling    Neuro: Resting in bed         Medications:  Current Facility-Administered Medications   Medication Dose Route Frequency    furosemide (LASIX) tablet 40 mg  40 mg Oral DAILY    predniSONE (DELTASONE) tablet 40 mg  40 mg Oral DAILY WITH BREAKFAST    doxycycline (VIBRA-TABS) tablet 100 mg  100 mg Oral Q12H    dilTIAZem (CARDIZEM) IR tablet 30 mg  30 mg Oral Q8H    metoprolol tartrate (LOPRESSOR) tablet 50 mg  50 mg Oral Q12H    albuterol-ipratropium (DUO-NEB) 2.5 MG-0.5 MG/3 ML  3 mL Nebulization Q6H RT    melatonin tablet 3 mg  3 mg Oral QHS    influenza vaccine 2019-20 (6 mos+)(PF) (FLUARIX/FLULAVAL/FLUZONE QUAD) injection 0.5 mL  0.5 mL IntraMUSCular PRIOR TO DISCHARGE    Warfarin - Pharmacy to Dose   Other Rx Dosing/Monitoring    lactobac ac& pc-s.therm-b.anim (HELADIO Q/RISAQUAD)  1 Cap Oral DAILY    arformoterol (BROVANA) neb solution 15 mcg  15 mcg Nebulization BID RT    And    budesonide (PULMICORT) 500 mcg/2 ml nebulizer suspension  500 mcg Nebulization BID RT    traMADol (ULTRAM) tablet 50 mg  50 mg Oral Q6H PRN    hydrALAZINE (APRESOLINE) 20 mg/mL injection 20 mg  20 mg IntraVENous Q6H PRN    lisinopril (PRINIVIL, ZESTRIL) tablet 10 mg  10 mg Oral DAILY    sodium chloride (NS) flush 5-40 mL  5-40 mL IntraVENous Q8H    sodium chloride (NS) flush 5-40 mL  5-40 mL IntraVENous PRN    acetaminophen (TYLENOL) tablet 650 mg  650 mg Oral Q4H PRN    ondansetron (ZOFRAN) injection 4 mg  4 mg IntraVENous Q4H PRN    aspirin delayed-release tablet 81 mg  81 mg Oral DAILY    atorvastatin (LIPITOR) tablet 40 mg  40 mg Oral DAILY    thiamine HCL (B-1) tablet 100 mg  100 mg Oral DAILY       Labs:  ABG Recent Labs     12/10/19  0450 12/09/19  1706 12/09/19  1215 12/07/19  1346   PHI 7.450 7.378 7.372 7.383   PCO2I 84.1* >90.0* >90.0* 74.0*   PO2I 90 93 103* 80   HCO3I 58.5*  --   --  44.1*   SO2I 97  --   --  95   FIO2I  --  40  --  65        CBC Recent Labs     12/10/19  0257 12/09/19  0311 12/08/19  0254   WBC 16.9* 18.6* 11.9*   HGB 11.7* 12.6 12.6   HCT 39.3 41.3 42.5    235 248   .3* 101.2* 101.7*   MCH 30.2 30.9 18.3        Metabolic  Panel Recent Labs     12/10/19  0257 12/09/19  1003 12/09/19  0311 12/08/19  0340    139 140  --    K 3.6 3.4* 3.2*  --    CL 94* 91* 93*  --    CO2 >45* >45* >45*  --    * 187* 176*  --    BUN 36* 29* 28*  --    CREA 0.99 1.14 1.09  --    CA 9.6 10.0 9.2  --    INR 1.6*  --  2.0* 2.3*        Pertinent Labs                Reid Bryan PA-C  12/10/2019

## 2019-12-10 NOTE — PROGRESS NOTES
Palliative Medicine Social Work    Consult received. Chart reviewed. Cisco Gilbert ( pronounced \"Weece\") is an 81 y.o man w/ PMH sx for afib, CVA, HTN, COPD, probable dementia (no formal dx). He presented to ED with AMS, R arm cellulitis with R elbow effusion s/p joint aspiration. Patient's hospitalization has been marked with confusion, agitation, and he is currently being worked up for acute chronic hypercapnic resp failure secondary to O2 dependent COPD and HF with worsening ABG. Palliative medicine consult for care decisions    Patient has AMD at home - son will bring in:   Primary Decision Maker: Hu Olivarez - Son, Dee [facility] - [phone]  DDNR on file    Social: lives in [address] [place] [address], [place] [address] lives 3 miles from him. Patient has 2 other sons in [facility] and other family in [place]  Baseline: Confused  (varies in severity); uses a walker for ambulation; 2L home O2     Spoke with son/MPOA briefly. He said patient usually receives care at [facility], not sure why he was brought to [facility] [place]. Son said patient has been hospitalized annually for last few years though he thinks it's been about 1.5 years since patient's last admission. Meeting scheduled for  Wed 2pm      Thank you for the opportunity to be involved in the care of Mr. Pavan Bassett and his family.     Thien Victoria, MOR, Supervisee in Social Work  Palliative Medicine   [phone]

## 2019-12-10 NOTE — PROGRESS NOTES
Pt eating dinner, coughing, and -130,  notified, orders received for NPO except for meds, and speech evaluation. Problem: Patient Education: Go to Patient Education Activity  Goal: Patient/Family Education  Outcome: Progressing Towards Goal  Note:   Pt got out of bed with PT to chair for one hour, returned to bed via criss lift. On specialty bed, turning. Problem: Chronic Obstructive Pulmonary Disease (COPD)  Goal: *Oxygen saturation during activity within specified parameters  Outcome: Progressing Towards Goal  Note:   Spoke with Dr. Elizabeth Bronson about pt ABG, on bipap, orders received to recheck ABG at 1300 and continue with bipap. Recheck ABG completed, Dr. Elizabeth Bronson notified, orders received for Nasal cannula & Bipap at night. Bedside shift change report given to [address] (oncoming nurse) by Vipin Steen (offgoing nurse). Report included the following information SBAR, Kardex, Procedure Summary, Intake/Output, MAR, Accordion, Recent Results, Med Rec Status and Cardiac Rhythm AFIB.

## 2019-12-10 NOTE — PROGRESS NOTES
Problem: Self Care Deficits Care Plan (Adult)  Goal: *Acute Goals and Plan of Care (Insert Text)  Description    FUNCTIONAL STATUS PRIOR TO ADMISSION: lives in SNF, hx of dementia and confusion per chart, patient reports mobility without equipment     HOME SUPPORT: per chart has son in area    Occupational Therapy Goals  Initiated 11/29/2019, reviewed 12/10/19  1. Patient will perform self-feeding with supervision/set-up using right dominant hand within 7 day(s). 2.  Patient will perform grooming with supervision/set-up within 7 day(s). 3.  Patient will perform upper body dressing with minimal assistance/contact guard assist within 7 day(s). 4.  Patient will perform toilet transfers with minimal assistance/contact guard assist and best technique within 7 day(s). 5.  Patient will tolerate AAROM right UE all joints with minimal complaint of pain within 7 day(s). Outcome: Progressing Towards Goal  OCCUPATIONAL THERAPY RE-EVALUATION  Patient: Yamilet Hanks (87 y.o. male)  Date: 12/10/2019  Diagnosis: Cellulitis of right arm [L03.113]   <principal problem not specified>       Precautions: Fall, Skin  Chart, occupational therapy assessment, plan of care, and goals were reviewed. ASSESSMENT  Based on the objective data described below, with change in respiratory status and move from W to [facility] 12/7/19, placed on Bipap, now on NC intermittently for self feeding and meds. Ice ordered for right elbow. ADLs acute limited by cardiopulmonary tolerance, poor ROM R UE, edema R UE, ROM and strength throughout, sitting balance supported with L Lateral lean throughout and therefore ability to get OOB.     Current Level of Function Impacting Discharge (ADLs): min A self feeding, max A grooming, total A all other ADLs, tolerated bed in chair position this session with noted fatigue preventing EOB ADLs (was moderate A edge of bed mobility)    Other factors to consider for discharge: Baseline: incontinent, dementia with poor short term memory and [place]         PLAN :  Recommendations and Planned Interventions: self care training, functional mobility training, therapeutic exercise, balance training, therapeutic activities, endurance activities, patient education, home safety training, and family training/education    Frequency/Duration: Patient will be followed by occupational therapy 3 times a week to address goals. Recommend with staff: criss to chair 1x/day    Recommend next OT session: sitting EOB unsupported ADLs    Recommendation for discharge: (in order for the patient to meet his/her long term goals)  Therapy up to 5 days/week in SNF setting    This discharge recommendation:  Has not yet been discussed the attending provider and/or case management    Equipment recommendations for successful discharge (if) home: hospital bed, mechanical lift, wheelchair       SUBJECTIVE:   Patient stated Thank you.     OBJECTIVE DATA SUMMARY:   Cognitive/Behavioral Status:  Neurologic State: Alert(was sleeping, easily aroused with position change in bed)  Orientation Level: Disoriented X4  Cognition: Decreased command following; Impaired decision making;Memory loss(baseline poor STM )             Functional Mobility and Transfers for ADLs:  Bed Mobility:       Transfers:             Balance:  Sitting: Impaired; With support(L lateral lean)    ADL Assessment:  Feeding: Minimum assistance drink setup L hand, nurse reported setup of food; cues time to eat    Oral Facial Hygiene/Grooming: Maximum assistance setup brush, completed L side anterior, A otherwise    Bathing: Total assistance    Upper Body Dressing: Total assistance    Lower Body Dressing: Total assistance    Toileting: Total assistance - nursing to address urine 2* jones cath off and saturated - she is coming back to do full body bath   Patient received sleeping on bipap; nurse present and stating can remove bipap and don NC, she completed task.  Patient tolerated scooting self up bed with max A cues throughout B LEs flexion and push to assist scoot. Patient tolerated bed in chair position to complete tasks. Cognitive Retraining  Following Commands: Follows one step commands/directions(with visual cue 2* [place] as well)    Therapeutic Exercises:   Patient demonstrated B UE shoulder flexion, wrist all planes 5 reps 2 sets with initial max A and progressed to min A each; same with LE tailor sitting stretch and hold 10 seconds with moderate A to gain full range L requiring more A than R. Activity Tolerance:   requires rest breaks  -379, RR 9-30, HR , 40% FIO2 bipap and transition to NC 2 L  Please refer to the flowsheet for vital signs taken during this treatment.     After treatment patient left in no apparent distress:   Sitting in chair, Call bell within reach, Side rails x 3, and bed/chair position     COMMUNICATION/COLLABORATION:   The patients plan of care was discussed with: Physical Therapist and Registered Nurse    Mikael Apley  Time Calculation: 16 mins

## 2019-12-10 NOTE — PROGRESS NOTES
Pharmacist Note  Warfarin Dosing  Consult provided for this 84 y.o.male to manage warfarin for Atrial Fibrillation    INR Goal: 2 - 3    Home regimen/ tablet size: 4 mg daily    Drugs that may increase INR: Doxycycline  Drugs that may decrease INR: None  Other current anticoagulants/ drugs that may increase bleeding risk: Aspirin  Risk factors: Age > 65  Daily INR ordered: YES    Recent Labs     12/10/19  0257 12/09/19  0311 12/08/19  0340 12/08/19  0254   HGB 11.7* 12.6  --  12.6   INR 1.6* 2.0* 2.3*  --      Date               INR                  Dose  11/27  2.7  4 mg (took PTA)   11/28  2.6  4 mg   11/29  3  2.5 mg   11/30               2.7                  3 mg  12/01               4.2                  Held  12/02  4.4  Held  -- Consult discontinued - Reconsulted 12/04 --  12/04  3.6  Hold   12/05  2.9  3 mg  12/06  2.3   2.5 mg  12/07  2.0  3 mg  12/08  2.3  2.5 mg  12/9                2                      3 mg--dose not given (patient refused)  12/10             1.6                    4 mg                                                                               Assessment/ Plan:  Warfarin 4 mg PO x 1 as per home regimen. Pharmacy will continue to monitor daily and adjust therapy as indicated. Please contact the pharmacist at  for outpatient recommendations if needed.      Milena Raymond, PharmD, BCPS

## 2019-12-10 NOTE — CDMP QUERY
Pt admitted with cellulitis of right arm and has CHF documented. Please further specify type and acuity of CHF in the medical record. Acute on Chronic Diastolic CHF Acute Diastolic CHF Chronic Diastolic CHF Other, please specify Clinically unable to determine The medical record reflects the following: 
  Risk Factors: Hx. COPD on home oxygen, on Laxix 20 mg po every day PTA Clinical Indicators:Per H&P-COPD w/ chronic hypoxic respiratory failure CXR- IMPRESSION: Small bilateral pleural effusions. 12/7-BNP 5072, CXR-IMPRESSION: Worsening: Congestive changes. 12/7-Per PN-RRT this AM for respiratory distress and being unresponsive. CXR this AM showed worsening pulmonary edema. ABG showed respiratory acidosis. Started on Bipap and transferred to [facility] Per pulmonary-Acute on chronic resp failure with hypercarbia - underlying COPD (O2 dependent) with CHF in setting of afib with RVR 
12/9-Per PN-Discussed with nursing. Blood work showed elevated HCo3 >45, ABG showed PCO2 >90. Started on bipap. Echo-Left Ventricle: Normal cavity size and systolic function (ejection fraction normal). Mild concentric hypertrophy. Estimated left ventricular ejection fraction is 61 - 65%. Visually measured ejection fraction. No regional wall motion abnormality noted. Normal left ventricular strain. Pericardium: There is a moderate left pleural effusion. Dilated right ventricle. Moderately reduced systolic function. Treatment: 12/7 orders Pulmonary consult, BIPAP, Lasix 40 mg IV tid, echo , BNP Thank you, Alona Nova BSN, RN 
[facility]  
[phone]

## 2019-12-10 NOTE — PROGRESS NOTES
Problem: Mobility Impaired (Adult and Pediatric)  Goal: *Acute Goals and Plan of Care (Insert Text)  Description  FUNCTIONAL STATUS PRIOR TO ADMISSION: Patient resides in assisted living and has dementia. Pt was unable to provide prior level of function due to dementia. HOME SUPPORT PRIOR TO ADMISSION: Pt lives in assisted living facility. Physical Therapy Goals  Revisited 12/10/2019, not met, remain appropriate, carry over        Physical Therapy Goals  Initiated 12/3/2019  1. Patient will move from supine to sit and sit to supine  in bed with supervision within 7 day(s). 2.  Patient will transfer from bed to chair and chair to bed with minimal assistance/contact guard assist using the least restrictive device within 7 day(s). 3.  Patient will perform sit to stand with minimal assistance/contact guard assist within 7 day(s). 4.  Patient will ambulate with minimal assistance/contact guard assist for 75 feet with the least restrictive device within 7 day(s). Outcome: Progressing very slowly Towards Goal      PHYSICAL THERAPY TREATMENT: WEEKLY REASSESSMENT  Patient: Kaitlynn Collins (80 y.o. male)  Date: 12/10/2019  Primary Diagnosis: Cellulitis of right arm [L03.113]        Precautions:   Fall, Skin      ASSESSMENT  Patient continues with skilled PT services and is slowly progressing towards goals. Pt was received on BIPAP, cleared by nursing so she placed him on nasal cannula at 2 liters and briefly increased to 5 liters and then back to 2 liters. It was difficult to get reliable 02 sat readings (cold hands on probe on his ear). At times his sats were mid 80s and then up to low 90s. He has significantly impaired sitting balance, needing constant support, posterior lean. Able to work on sit to stand to a walker, mod assist X 2 and then min assist X 2. He was able to amb to the chair and remain up to the chair post session.  Gains are slow and per his son, pt was mod I at baseline amb with a rolling walker. Continue to recommend rehab. Overall fairly good session especially considering CO2 retention at this time. .     Patient's progression toward goals since last assessment: decline in function (acute respiratory failure) but now working again with therapy. Current Level of Function Impacting Discharge (mobility/balance): poor sitting and standing balance. Up to max assist X 2. Functional Outcome Measure: The patient scored 7/28 on the Tinetti outcome measure which is indicative of high fall risk. .      Other factors to consider for discharge: dementia, far form his baseline of mod I using a walker, C02 retention requiring supplemental 02 and BIPAP          PLAN :  Goals have been updated based on progression since last assessment. Patient continues to benefit from skilled intervention to address the above impairments. Recommendations and Planned Interventions: bed mobility training, transfer training, gait training, therapeutic exercises, patient and family training/education, and therapeutic activities      Frequency/Duration: Patient will be followed by physical therapy:  5 times a week to address goals. Recommendation for discharge: (in order for the patient to meet his/her long term goals)  Therapy up to 5 days/week in SNF setting    This discharge recommendation:  A follow-up discussion with the attending provider and/or case management is planned    IF patient discharges home will need the following DME: to be determined (TBD)         SUBJECTIVE:   Patient stated [facility].     OBJECTIVE DATA SUMMARY:   Chart checked, pt cleared by nursing  HISTORY:    Past Medical History:   Diagnosis Date    [facility] [address]     Chronic obstructive pulmonary disease ([facility] [address]     Hypertension    History reviewed. No pertinent surgical history.     Personal factors and/or comorbidities impacting plan of care: dementia,     Home Situation  Home Environment: [address] Name: [facility]  One/Two Story Residence: One story  Living Alone: No  Support Systems: Family member(s)  Patient Expects to be Discharged to[de-identified] Assisted living  Current DME Used/Available at Home: Walker    EXAMINATION/PRESENTATION/DECISION MAKING:   Critical Behavior:  Neurologic State: Alert(was sleeping, easily aroused with position change in bed)  Orientation Level: Disoriented X4  Cognition: Decreased command following, Impaired decision making, Memory loss(baseline poor STM )  Safety/Judgement: Decreased awareness of need for safety, Decreased insight into deficits, Decreased awareness of need for assistance, Decreased awareness of environment  Hearing: Auditory  Auditory Impairment: Hard of hearing, bilateral  Skin:  refer to MD and nursing notes  Edema: refer to MD and nursing notes  Range Of Motion:  AROM: Generally decreased, functional                       Strength:    Strength: Generally decreased, functional                    Tone & Sensation:                                  Coordination:  Coordination: Generally decreased, functional  Vision:      Functional Mobility:  Bed Mobility:     Supine to Sit: Maximum assistance;Assist x2        Transfers:  Sit to Stand: Minimum assistance; Moderate assistance;Assist x2  Stand to Sit: Moderate assistance;Assist x2        Bed to Chair: Moderate assistance;Assist x2              Balance:   Sitting: Impaired; With support  Sitting - Static: Poor (constant support)  Sitting - Dynamic: Poor (constant support)  Standing: Impaired; With support  Standing - Static: Constant support;Poor  Standing - Dynamic : Constant support;Poor  Ambulation/Gait Training:                                                         Stairs:               Therapeutic Exercises:       Functional Measure:  Tinetti test:    Sitting Balance: 1  Arises: 0  Attempts to Rise: 0  Immediate Standing Balance: 0  Standing Balance: 0  Nudged: 0  Eyes Closed: 0  Turn 360 Degrees - Continuous/Discontinuous: 0  Turn 360 Degrees - Steady/Unsteady: 0  Sitting Down: 1  Balance Score: 2 Balance total score  Indication of Gait: 0  R Step Length/Height: 1  L Step Length/Height: 1  R Foot Clearance: 1  L Foot Clearance: 1  Step Symmetry: 1  Step Continuity: 0  Path: 0  Trunk: 0  Walking Time: 0  Gait Score: 5 Gait total score  Total Score: 7/28 Overall total score         Tinetti Tool Score Risk of Falls  <19 = High Fall Risk  19-24 = Moderate Fall Risk  25-28 = Low Fall Risk  Tinetti ME. Performance-Oriented Assessment of Mobility Problems in Elderly Patients. [facility] 66; [id number]. (Scoring Description: PT Bulletin Feb. 10, 1993)    Older adults: Meñotorrie Haywood et al, 2009; n = 1000 [facility] elderly evaluated with ABC, STEPHANY, ADL, and IADL)  · Mean STEPHANY score for males aged 69-68 years = 26.21(3.40)  · Mean STEPHANY score for females age 69-68 years = 25.16(4.30)  · Mean STEPHANY score for males over 80 years = 23.29(6.02)  · Mean STEPHANY score for females over 80 years = 17.20(8.32)          Pain Rating:      Activity Tolerance:   Good and requires rest breaks  Please refer to the flowsheet for vital signs taken during this treatment. After treatment patient left in no apparent distress:   Sitting in chair, Call bell within reach, and Bed / chair alarm activated    COMMUNICATION/EDUCATION:   The patients plan of care was discussed with: Occupational Therapist and Registered Nurse. Fall prevention education was provided and the patient/caregiver indicated understanding., Patient/family have participated as able in goal setting and plan of care. , and Patient/family agree to work toward stated goals and plan of care.     Thank you for this referral.  Chinmay Been   Time Calculation: 30 mins

## 2019-12-10 NOTE — ROUTINE PROCESS
Bedside shift change report given to Barry Pope RN (oncoming nurse) by Deonna Waters RN (offgoing nurse). Report included the following information SBAR, Kardex, ED Summary, Intake/Output, MAR, Accordion, Recent Results and Cardiac Rhythm A-fib.

## 2019-12-10 NOTE — PROGRESS NOTES
Problem: Falls - Risk of  Goal: *Absence of Falls  Description  Document Ina Rod Fall Risk and appropriate interventions in the flowsheet. Outcome: Progressing Towards Goal  Note:   Fall Risk Interventions:  Bed is in the lowest position and wheels are locked, call bell is within reach, bathroom light is on during evening hours, gripper socks are on and patient has been instructed to call out for assistance if needed. As of now, patient is free from falls and will continue to be monitored.         Problem: Chronic Obstructive Pulmonary Disease (COPD)  Goal: *Oxygen saturation during activity within specified parameters  Outcome: Progressing Towards Goal  Note:   Patient is on bipap and his O2 saturations are remaining above 90%  Goal: *Absence of hypoxia  Outcome: Progressing Towards Goal  Note:   Patient's O2 saturations is remaining above 90%

## 2019-12-11 LAB
ARTERIAL PATENCY WRIST A: ABNORMAL
BACTERIA SPEC CULT: NORMAL
BACTERIA SPEC CULT: NORMAL
BDY SITE: ABNORMAL
GAS FLOW.O2 O2 DELIVERY SYS: ABNORMAL L/MIN
GAS FLOW.O2 SETTING OXYMISER: 2.5 L/M
GRAM STN SPEC: NORMAL
GRAM STN SPEC: NORMAL
INR PPP: 1.4 (ref 0.9–1.1)
PCO2 BLD: >90 MMHG (ref 35–45)
PH BLD: 7.38 [PH] (ref 7.35–7.45)
PO2 BLD: 64 MMHG (ref 80–100)
PROTHROMBIN TIME: 14.2 SEC (ref 9–11.1)
SERVICE CMNT-IMP: NORMAL
SPECIMEN TYPE: ABNORMAL

## 2019-12-11 PROCEDURE — 94664 DEMO&/EVAL PT USE INHALER: CPT

## 2019-12-11 PROCEDURE — 74011250637 HC RX REV CODE- 250/637: Performed by: HOSPITALIST

## 2019-12-11 PROCEDURE — 92526 ORAL FUNCTION THERAPY: CPT

## 2019-12-11 PROCEDURE — 94640 AIRWAY INHALATION TREATMENT: CPT

## 2019-12-11 PROCEDURE — 65660000001 HC RM ICU INTERMED STEPDOWN

## 2019-12-11 PROCEDURE — 74011000250 HC RX REV CODE- 250: Performed by: INTERNAL MEDICINE

## 2019-12-11 PROCEDURE — 36415 COLL VENOUS BLD VENIPUNCTURE: CPT

## 2019-12-11 PROCEDURE — 92610 EVALUATE SWALLOWING FUNCTION: CPT

## 2019-12-11 PROCEDURE — 74011250637 HC RX REV CODE- 250/637: Performed by: INTERNAL MEDICINE

## 2019-12-11 PROCEDURE — 85610 PROTHROMBIN TIME: CPT

## 2019-12-11 PROCEDURE — 77010033678 HC OXYGEN DAILY

## 2019-12-11 PROCEDURE — 94660 CPAP INITIATION&MGMT: CPT

## 2019-12-11 PROCEDURE — 97530 THERAPEUTIC ACTIVITIES: CPT

## 2019-12-11 PROCEDURE — 74011636637 HC RX REV CODE- 636/637: Performed by: INTERNAL MEDICINE

## 2019-12-11 RX ORDER — AMOXICILLIN 250 MG
1 CAPSULE ORAL DAILY
Status: DISCONTINUED | OUTPATIENT
Start: 2019-12-11 | End: 2019-12-14 | Stop reason: HOSPADM

## 2019-12-11 RX ORDER — WARFARIN 4 MG/1
4 TABLET ORAL ONCE
Status: COMPLETED | OUTPATIENT
Start: 2019-12-11 | End: 2019-12-11

## 2019-12-11 RX ORDER — IPRATROPIUM BROMIDE AND ALBUTEROL SULFATE 2.5; .5 MG/3ML; MG/3ML
3 SOLUTION RESPIRATORY (INHALATION)
Status: DISCONTINUED | OUTPATIENT
Start: 2019-12-11 | End: 2019-12-14 | Stop reason: HOSPADM

## 2019-12-11 RX ADMIN — IPRATROPIUM BROMIDE AND ALBUTEROL SULFATE 3 ML: .5; 3 SOLUTION RESPIRATORY (INHALATION) at 02:59

## 2019-12-11 RX ADMIN — METOPROLOL TARTRATE 50 MG: 50 TABLET ORAL at 09:29

## 2019-12-11 RX ADMIN — IPRATROPIUM BROMIDE AND ALBUTEROL SULFATE 3 ML: .5; 3 SOLUTION RESPIRATORY (INHALATION) at 22:33

## 2019-12-11 RX ADMIN — DILTIAZEM HYDROCHLORIDE 30 MG: 30 TABLET, FILM COATED ORAL at 14:09

## 2019-12-11 RX ADMIN — DILTIAZEM HYDROCHLORIDE 30 MG: 30 TABLET, FILM COATED ORAL at 05:28

## 2019-12-11 RX ADMIN — Medication 100 MG: at 09:29

## 2019-12-11 RX ADMIN — STANDARDIZED SENNA CONCENTRATE AND DOCUSATE SODIUM 1 TABLET: 8.6; 5 TABLET ORAL at 11:19

## 2019-12-11 RX ADMIN — WARFARIN SODIUM 4 MG: 4 TABLET ORAL at 17:34

## 2019-12-11 RX ADMIN — ASPIRIN 81 MG: 81 TABLET, COATED ORAL at 09:29

## 2019-12-11 RX ADMIN — DOXYCYCLINE HYCLATE 100 MG: 100 TABLET, COATED ORAL at 09:29

## 2019-12-11 RX ADMIN — BUDESONIDE 500 MCG: 0.5 INHALANT RESPIRATORY (INHALATION) at 22:33

## 2019-12-11 RX ADMIN — Medication 10 ML: at 14:00

## 2019-12-11 RX ADMIN — METOPROLOL TARTRATE 50 MG: 50 TABLET ORAL at 22:06

## 2019-12-11 RX ADMIN — DOXYCYCLINE HYCLATE 100 MG: 100 TABLET, COATED ORAL at 22:06

## 2019-12-11 RX ADMIN — Medication 10 ML: at 22:06

## 2019-12-11 RX ADMIN — LISINOPRIL 10 MG: 10 TABLET ORAL at 09:29

## 2019-12-11 RX ADMIN — ARFORMOTEROL TARTRATE 15 MCG: 15 SOLUTION RESPIRATORY (INHALATION) at 08:25

## 2019-12-11 RX ADMIN — IPRATROPIUM BROMIDE AND ALBUTEROL SULFATE 3 ML: .5; 3 SOLUTION RESPIRATORY (INHALATION) at 13:10

## 2019-12-11 RX ADMIN — ATORVASTATIN CALCIUM 40 MG: 40 TABLET, FILM COATED ORAL at 09:29

## 2019-12-11 RX ADMIN — FUROSEMIDE 40 MG: 40 TABLET ORAL at 09:29

## 2019-12-11 RX ADMIN — BUDESONIDE 500 MCG: 0.5 INHALANT RESPIRATORY (INHALATION) at 08:25

## 2019-12-11 RX ADMIN — MELATONIN 3 MG: at 22:06

## 2019-12-11 RX ADMIN — DILTIAZEM HYDROCHLORIDE 30 MG: 30 TABLET, FILM COATED ORAL at 22:06

## 2019-12-11 RX ADMIN — PREDNISONE 40 MG: 20 TABLET ORAL at 09:29

## 2019-12-11 RX ADMIN — Medication 1 CAPSULE: at 09:29

## 2019-12-11 RX ADMIN — Medication 10 ML: at 05:37

## 2019-12-11 NOTE — PROGRESS NOTES
Problem: Mobility Impaired (Adult and Pediatric)  Goal: *Acute Goals and Plan of Care (Insert Text)  Description  FUNCTIONAL STATUS PRIOR TO ADMISSION: Patient resides in assisted living and has dementia. Pt was unable to provide prior level of function due to dementia. HOME SUPPORT PRIOR TO ADMISSION: Pt lives in assisted living facility. Physical Therapy Goals  Revisited 12/10/2019, not met, remain appropriate, carry over        Physical Therapy Goals  Initiated 12/3/2019  1. Patient will move from supine to sit and sit to supine  in bed with supervision within 7 day(s). 2.  Patient will transfer from bed to chair and chair to bed with minimal assistance/contact guard assist using the least restrictive device within 7 day(s). 3.  Patient will perform sit to stand with minimal assistance/contact guard assist within 7 day(s). 4.  Patient will ambulate with minimal assistance/contact guard assist for 75 feet with the least restrictive device within 7 day(s). Outcome: Progressing Towards Goal   PHYSICAL THERAPY TREATMENT  Patient: Kaylene Xiong (35 y.o. male)  Date: 12/11/2019  Diagnosis: Cellulitis of right arm [L03.113]   <principal problem not specified>       Precautions: Fall, Skin  Chart, physical therapy assessment, plan of care and goals were reviewed. ASSESSMENT  Patient continues with skilled PT services and is progressing towards goals. Today he was sleeping on arrival and difficult to wake up but once up worked with therapy. Patient with strong posterior lean when sitting edge of bed. It improves some with facilitation for trunk flexion. Once seated in chair, even unsupported posterior lean is minimal. Patient stood today 5 times needing mod assist of 2 each time but from the chair it was close to min assist of 2. He was able to take several steps using a walker with mod assist of 2 but maintains a flexed posture at back/hips/ knees.  Nursing to call mobility team for criss lift back to bed. Current Level of Function Impacting Discharge (mobility/balance): 2 assists needed for mobility    Other factors to consider for discharge:          PLAN :  Patient continues to benefit from skilled intervention to address the above impairments. Continue treatment per established plan of care. to address goals. Recommendation for discharge: (in order for the patient to meet his/her long term goals)  Therapy up to 5 days/week in SNF setting    This discharge recommendation:  A follow-up discussion with the attending provider and/or case management is planned    IF patient discharges home will need the following DME: will continue to assess       SUBJECTIVE:   Patient stated I am thirsty.     OBJECTIVE DATA SUMMARY:   Critical Behavior:  Neurologic State: Alert, Confused, groggy  Orientation Level: Oriented to person  Cognition: Follows commands  Safety/Judgement: Decreased awareness of need for safety, Decreased insight into deficits, Decreased awareness of need for assistance, Decreased awareness of environment  Functional Mobility Training:  Bed Mobility:   Supine to Sit: Maximum assistance;Assist x2   Scooting: Maximum assistance(patient able to shift weight but needed assist to scoot hips forward and for sitting balance)   Transfers:  Sit to Stand: Maximum assistance;Assist x2(from bed and from chair. Stood x5) First attempt for transfer to chair, next attempts stood with support of walker for 15 seconds  Stand to Sit: Moderate assistance;Assist x2        Bed to Chair: Moderate assistance;Assist x2(short shuffling steps with flexed posture using a walker for support)         Balance:  Sitting: Impaired(posterior lean)  Sitting - Static: Poor (constant support)  Sitting - Dynamic: Poor (constant support)  Standing: Impaired  Standing - Static: Poor  Standing - Dynamic : Poor     Therapeutic Exercises:    Ankle pumps and LAQ  Pain Rating:  No complaint    Activity Tolerance:   Good  Please refer to the flowsheet for vital signs taken during this treatment.     After treatment patient left in no apparent distress:   Sitting in chair, Call bell within reach, and Bed / chair alarm activated    COMMUNICATION/COLLABORATION:   The patients plan of care was discussed with: Registered Nurse    Madie Pollock, PT   Time Calculation: 25 mins

## 2019-12-11 NOTE — PROGRESS NOTES
Pharmacist Note - Warfarin Dosing  Consult provided for this 84 y.o.male to manage warfarin for Atrial Fibrillation    INR Goal: 2 - 3    Home regimen/ tablet size: 4 mg daily    Drugs that may increase INR: Doxycycline  Drugs that may decrease INR: None  Other current anticoagulants/ drugs that may increase bleeding risk: Aspirin  Risk factors: Age > 65  Daily INR ordered: YES    Recent Labs     12/11/19  0744 12/10/19  0257 12/09/19  0311   HGB  --  11.7* 12.6   INR 1.4* 1.6* 2.0*     Date               INR                  Dose  11/27  2.7  4 mg (took PTA)   11/28  2.6  4 mg   11/29  3  2.5 mg   11/30               2.7                  3 mg  12/01               4.2                  Held  12/02  4.4  Held  -- Consult discontinued - Reconsulted 12/04 --  12/04  3.6  Hold   12/05  2.9  3 mg  12/06  2.3   2.5 mg  12/07  2.0  3 mg  12/08  2.3  2.5 mg  12/9                2                      3 mg--dose not given (patient refused)  12/10             1.6                    4 mg  12/11             1.4                    4 mg                                                                               Assessment/ Plan:  Warfarin 4 mg PO x 1 as per home regimen. Pharmacy will continue to monitor daily and adjust therapy as indicated. Please contact the pharmacist at  for outpatient recommendations if needed.      Suzanne Geroge, PharmD, BCPS

## 2019-12-11 NOTE — CONSULTS
Palliative Medicine Consult  [place]: [phone] ([phone])    Patient Name: Kaylene Xiong  YOB: 1935    Date of Initial Consult: 12/11/2019  Reason for Consult: Goals of care discussion  Requesting Provider: Dr. Edie York  Primary Care Physician: Radha Smith MD     SUMMARY:   Kaylene Xiong is a 81 y.o. with a past history of COPD (O2 dependent), CHF, A. fib, HTN who was admitted on 11/27/2019 from  with a diagnosis of altered mental status and right upper extremity cellulitis. Presented to the ER with CC of altered mental status. In ER, right upper extremity cellulitis, x-ray right elbow + joint effusion, underwent ultrasound-guided aspiration. Started on IV ABX. CT head with no acute process,+ chronic left posterior parietal infarct, periventricular white matter disease secondary to chronic small vessel ischemic changes. CXR + small bilateral pleural effusions. Course of hospitalization: The initial blood cultures negative, right arm cellulitis not improving, ID consulted on 12/4, adjusting medications. A. fib with RVR 12/7 pulmonology consulted, patient somnolent, CXR with pulmonary edema, PCO2 greater than 90, normal pH, suggesting chronic respiratory failure, bicarb on admission 35 suggesting chronic hypercarbia placed on BiPAP. Believe metabolic encephalopathy secondary to CO2 in setting of baseline undiagnosed dementia speech pathology following, notes seemingly functional oropharyngeal phase of swallowing, however chronic wrist aspiration given dementia and COPD    Current medical issues leading to Palliative Medicine involvement include: Goals of care in the setting of acute on chronic respiratory failure, dysphagia, decline in mentation, debility. PALLIATIVE DIAGNOSES:   1. Advance care planning discussion  2. Goals of care discussion  3. Altered mental status, unspecified  4. Debility       PLAN:   1.  Revisit completed extensive chart review, including medical documentation, vitals, MARs and results of labs and other diagnostics. I also spoke with Mckenna MANN pulmonology. 2. Palliative  Nayla Coronado myself met with patient, his son/medical POA Sofia Fuentes, who is at bedside. Introduced myself, reviewed role of palliative. Patient oriented to self only, did not know was in hospital, unable to provide information regarding health, unable to participate in discussion. 3. Assessed son's understanding of hospitalization. Discussed new finding of hypercarbia, use of BiPAP, possible trilogy at discharge. Also talked about patient not tolerating BiPAP mask, that even if ordered trilogy, patient does not keep on, will have no benefit and he is at high risk for rehospitalization. 4. Advanced care planning discussion- No AMD in EMR. Patient's son, Sofia Fuentes, [phone], brought in copy of AMD, in which patient designated Rex Vera as his primary healthcare decision maker and son Chante Hernandez as a secondary healthcare decision maker. 5. Goals of care discussion-William stated he wants patient to return to [address] [place] [address]. Son is open to patient getting trilogy if it would benefit patient. Spent time discussing patient's intolerance of the mask, son stated some of this may be related to ill fitting mask, being too small and/over not covering properly due to facial hair. 6. DNR discussion- +DDNR in EMR  7. Altered mental status, unspecified-mentation waxing and waning, son reported had been better earlier today, but no fatigued, increased difficulty with word finding and more confusion. Patient son stated overall mentation has improved throughout hospitalization. Etiology metabolic encephalopathy, likely secondary to chronic hypercarbia, infection. 8. Debility-not at baseline, which had been ambulating with a Rollator, now needing 2 persons assist with sit to stand. 9. Psychosocial assessment completed, see above  10.  Baseline cognitive and functional status assessed, see above  11. Palliative ESAS completed  12. Initial consult note routed to primary continuity provider and/or primary health care team members  15. Communicated plan of care with: Palliative Gregory ALEJANDRE [facility] Team     GOALS OF CARE / TREATMENT PREFERENCES:     GOALS OF CARE:  Patient/Health Care Proxy Stated Goals: Prolong life    TREATMENT PREFERENCES:   Code Status: DNR    Advance Care Planning:  [x] [facility] Interdisciplinary Team has updated the ACP Navigator with Daxa and Patient Capacity      Primary Decision Maker (Active): Lucia Harmon, Legal Guardian - [phone]    Secondary Decision Maker: Ninfa Donnie - Aguilar  Advance Care Planning 12/11/2019   Patient's Healthcare Decision Maker is: Named in scanned ACP document   Confirm Advance Directive Yes, on file   Does the patient have other document types Do Not Resuscitate       Medical Interventions: Limited additional interventions     Other Instructions:   Artificially Administered Nutrition: (did not address at time of visit)     Other:    As far as possible, the palliative care team has discussed with patient / health care proxy about goals of care / treatment preferences for patient. HISTORY:     History obtained from: Medical record/son    CHIEF COMPLAINT: N/A    HPI/SUBJECTIVE:    The patient is:   [] Verbal and participatory  [x] Non-participatory due to:   Patient oriented to self and has difficulty with word retrieval, though very pleasant and easily engaged, he is unable to provide ROS. Patient did deny pain, denied feeling short of breath.     Clinical Pain Assessment (nonverbal scale for severity on nonverbal patients):   Clinical Pain Assessment  Severity: 0     Activity (Movement): Lying quietly, normal position    Duration: for how long has pt been experiencing pain (e.g., 2 days, 1 month, years)  Frequency: how often pain is an issue (e.g., several times per day, once every few days, constant)     FUNCTIONAL ASSESSMENT:     Palliative Performance Scale (PPS):  PPS: 30       PSYCHOSOCIAL/SPIRITUAL SCREENING:     Palliative IDT has assessed this patient for cultural preferences / practices and a referral made as appropriate to needs (Cultural Services, Patient Advocacy, Ethics, etc.)    Any spiritual / Rastafarian concerns:  [] Yes /  [x] No    Caregiver Burnout:  [] Yes /  [x] No /  [] No Caregiver Present      Anticipatory grief assessment:   [x] Normal  / [] Maladaptive       ESAS Anxiety: Anxiety: 0    ESAS Depression: Depression: 0        REVIEW OF SYSTEMS:     Positive and pertinent negative findings in ROS are noted above in HPI. The following systems were [] reviewed / [x] unable to be reviewed as noted in HPI  Other findings are noted below. Systems: constitutional, ears/nose/mouth/throat, respiratory, gastrointestinal, genitourinary, musculoskeletal, integumentary, neurologic, psychiatric, endocrine. Positive findings noted below. Modified ESAS Completed by: provider   Fatigue: 5 Drowsiness: 0   Depression: 0 Pain: 0   Anxiety: 0 Nausea: 0   Anorexia: 0 Dyspnea: 0                    PHYSICAL EXAM:     From RN flowsheet:  Wt Readings from Last 3 Encounters:   12/11/19 175 lb 12.8 oz (79.7 kg)     Blood pressure 112/58, pulse 95, temperature 98.5 °F (36.9 °C), resp. rate 19, height 5' 10\" (1.778 m), weight 175 lb 12.8 oz (79.7 kg), SpO2 98 %.     Pain Scale 1: Numeric (0 - 10)  Pain Intensity 1: 0     Pain Location 1: Arm  Pain Orientation 1: Right  Pain Description 1: Aching, Burning  Pain Intervention(s) 1: Ice, Repositioned, Rest  Last bowel movement, if known:     Constitutional: Elderly, frail, somewhat disheveled, pleasant, NAD  Eyes: pupils equal, anicteric  ENMT: no nasal discharge, moist mucous membranes  Cardiovascular: regular rhythm, distal pulses intact  Respiratory: breathing not labored, symmetric, nasal cannula  Gastrointestinal: soft non-tender, +bowel sounds  Musculoskeletal: no deformity, no tenderness to palpation  Skin: Cool to touch, dependent edema upper/lower extremities  Neurologic: Oriented to self only, difficulty with word retrieval/unable to finish sentences, following simple commands, moving upper extremities  Psychiatric: Appropriate affect, no hallucinations  Other:       HISTORY:     Active Problems:    Cellulitis of right arm (11/27/2019)      Past Medical History:   Diagnosis Date    Atrial fibrillation (HCC)     Chronic obstructive pulmonary disease ([facility] [address]     Hypertension       History reviewed. No pertinent surgical history. History reviewed. No pertinent family history. History reviewed, no pertinent family history.   Social History     Tobacco Use    Smoking status: Former Smoker    Smokeless tobacco: Never Used   Substance Use Topics    Alcohol use: Not Currently     Allergies   Allergen Reactions    Seroquel [Quetiapine] Unknown (comments)      Current Facility-Administered Medications   Medication Dose Route Frequency    senna-docusate (PERICOLACE) 8.6-50 mg per tablet 1 Tab  1 Tab Oral DAILY    albuterol-ipratropium (DUO-NEB) 2.5 MG-0.5 MG/3 ML  3 mL Nebulization TID RT    warfarin (COUMADIN) tablet 4 mg  4 mg Oral ONCE    furosemide (LASIX) tablet 40 mg  40 mg Oral DAILY    predniSONE (DELTASONE) tablet 40 mg  40 mg Oral DAILY WITH BREAKFAST    doxycycline (VIBRA-TABS) tablet 100 mg  100 mg Oral Q12H    dilTIAZem (CARDIZEM) IR tablet 30 mg  30 mg Oral Q8H    metoprolol tartrate (LOPRESSOR) tablet 50 mg  50 mg Oral Q12H    melatonin tablet 3 mg  3 mg Oral QHS    influenza vaccine 2019-20 (6 mos+)(PF) (FLUARIX/FLULAVAL/FLUZONE QUAD) injection 0.5 mL  0.5 mL IntraMUSCular PRIOR TO DISCHARGE    Warfarin - Pharmacy to Dose   Other Rx Dosing/Monitoring    lactobac ac& pc-s.therm-b.anim (HELADIO Q/RISAQUAD)  1 Cap Oral DAILY    arformoterol (BROVANA) neb solution 15 mcg  15 mcg Nebulization BID RT    And    budesonide (PULMICORT) 500 mcg/2 ml nebulizer suspension  500 mcg Nebulization BID RT    traMADol (ULTRAM) tablet 50 mg  50 mg Oral Q6H PRN    hydrALAZINE (APRESOLINE) 20 mg/mL injection 20 mg  20 mg IntraVENous Q6H PRN    lisinopril (PRINIVIL, ZESTRIL) tablet 10 mg  10 mg Oral DAILY    sodium chloride (NS) flush 5-40 mL  5-40 mL IntraVENous Q8H    sodium chloride (NS) flush 5-40 mL  5-40 mL IntraVENous PRN    acetaminophen (TYLENOL) tablet 650 mg  650 mg Oral Q4H PRN    ondansetron (ZOFRAN) injection 4 mg  4 mg IntraVENous Q4H PRN    aspirin delayed-release tablet 81 mg  81 mg Oral DAILY    atorvastatin (LIPITOR) tablet 40 mg  40 mg Oral DAILY    thiamine HCL (B-1) tablet 100 mg  100 mg Oral DAILY          LAB AND IMAGING FINDINGS:     Lab Results   Component Value Date/Time    WBC 16.9 (H) 12/10/2019 02:57 AM    HGB 11.7 (L) 12/10/2019 02:57 AM    PLATELET 799 52/91/0597 02:57 AM     Lab Results   Component Value Date/Time    Sodium 143 12/10/2019 02:57 AM    Potassium 3.6 12/10/2019 02:57 AM    Chloride 94 (L) 12/10/2019 02:57 AM    CO2 >45 (HH) 12/10/2019 02:57 AM    BUN 36 (H) 12/10/2019 02:57 AM    Creatinine 0.99 12/10/2019 02:57 AM    Calcium 9.6 12/10/2019 02:57 AM      Lab Results   Component Value Date/Time    AST (SGOT) 18 11/27/2019 03:20 PM    Alk.  phosphatase 82 11/27/2019 03:20 PM    Protein, total 6.9 11/27/2019 03:20 PM    Albumin 3.3 (L) 11/27/2019 03:20 PM    Globulin 3.6 11/27/2019 03:20 PM     Lab Results   Component Value Date/Time    INR 1.4 (H) 12/11/2019 07:44 AM    Prothrombin time 14.2 (H) 12/11/2019 07:44 AM      No results found for: IRON, FE, TIBC, IBCT, PSAT, FERR   No results found for: PH, PCO2, PO2  No components found for: GLPOC   No results found for: CPK, CKMB             Total time: 70  Counseling / coordination time, spent as noted above: 55  > 50% counseling / coordination?:  Yes    Prolonged service was provided for  []30 min   []75 min in face to face time in the presence of the patient, spent as noted above. Time Start:   Time End:   Note: this can only be billed with 25794 (initial) or 72909 (follow up). If multiple start / stop times, list each separately.

## 2019-12-11 NOTE — PROGRESS NOTES
Problem: Patient Education: Go to Patient Education Activity  Goal: Patient/Family Education  Outcome: Progressing Towards Goal  Note:   Pt seen by SLP, spoke with Dr. Shoaib Burnette, orders received for regular diet with soft foods. Problem: Patient Education: Go to Patient Education Activity  Goal: Patient/Family Education  Outcome: Progressing Towards Goal  Note:   Pt worked with PT, up with max assist to chair, will use criss for back to bed. Problem: Chronic Obstructive Pulmonary Disease (COPD)  Goal: *Oxygen saturation during activity within specified parameters  Outcome: Progressing Towards Goal  Note:   Pt on 2 L NC, baseline, oxygen saturations above 90%, will continue to monitor. Bedside shift change report given to Lady Puri (oncoming nurse) by Markel Fernandes (offgoing nurse). Report included the following information SBAR, Kardex, Intake/Output, Recent Results and Cardiac Rhythm afib.

## 2019-12-11 NOTE — PROGRESS NOTES
ADULT PROTOCOL: JET AEROSOL ASSESSMENT    Patient  Anibal Michaels     81 y.o.   male     12/11/2019  10:49 AM    Breath Sounds Pre Procedure: Right Breath Sounds: Clear, Diminished                               Left Breath Sounds: Clear, Diminished    Breath Sounds Post Procedure: Right Breath Sounds: Clear, Diminished                                 Left Breath Sounds: Clear, Diminished    Breathing pattern: Pre procedure Breathing Pattern: Regular          Post procedure Breathing Pattern: Regular    Heart Rate: Pre procedure Pulse: 71           Post procedure Pulse: 99    Resp Rate: Pre procedure Respirations: 20           Post procedure Respirations: 21      Cough: Pre procedure Cough: Non-productive               Post procedure Cough: Non-productive    Suctioned: NO, not reqd      Oxygen: O2 Device: Nasal cannula   2.5 lpm     Changed: NO    SpO2: Pre procedure SpO2: 99 %   with oxygen              Post procedure SpO2: 100 %  with oxygen    Nebulizer Therapy: Current medications Aerosolized Medications: DuoNeb      Changed: YES        Problem List:   Patient Active Problem List   Diagnosis Code    Cellulitis of right arm L03. 113       Respiratory Therapist: Bridger Dallas RT

## 2019-12-11 NOTE — PROGRESS NOTES
SPEECH PATHOLOGY BEDSIDE SWALLOW EVALUATION/DISCHARGE  Patient: Lore Rodarte (6 y.o. male)  Date: 12/11/2019  Primary Diagnosis: Cellulitis of right arm [L03.113]       Precautions: Fall, Skin    ASSESSMENT :  Based on the objective data described below, the patient presents with seemingly functional oropharyngeal phases of swallow. Pt with cough x1 with very large, sequential sips of thin liquids, but otherwise no overt s/s of aspiration. Pt unfortunately will be at chronic risk for prandial aspiration given his dementia and COPD, however, feel pt is safe to continue regular diet/thin liquids with supervision to ensure small sips and to ensure that, cognitively, patient is appropriate to take PO. Skilled acute therapy provided by a speech-language pathologist is not indicated at this time. PLAN :  Recommendations:  --regular diet/thin liquids  --supervision to ensure small sips  --upright  --meds as tolerated (applesauce may be helpful)  Discharge Recommendations: [address] [place] [address] Avenue:   Patient stated, \"I'm reading the paper. OBJECTIVE:     Past Medical History:   Diagnosis Date    At[facility] fibrillation ([facility] [address]     Chronic obstructive pulmonary disease ([facility] [address]     Hypertension    History reviewed. No pertinent surgical history.   Prior Level of Function/Home Situation:   Home Situation  Home Environment: Assisted living  [address] Name: [facility]  One/Two Story Residence: One story  Living Alone: No  Support Systems: Family member(s)  Patient Expects to be Discharged to[de-identified] Assisted living  Current DME Used/Available at Home: [address] prior to admission: Regular diet/thin liquids  Current Diet:  NPO until seen by SLP  Cognitive and Communication Status:  Neurologic State: Alert, Confused  Orientation Level: Oriented to person, Disoriented to situation, Disoriented to time, Disoriented to place  Cognition: Follows commands  Perception: Appears intact  Perseveration: No perseveration noted  Safety/Judgement: Decreased awareness of environment, Decreased awareness of need for assistance, Decreased awareness of need for safety  Oral Assessment:  Oral Assessment  Labial: No impairment  Dentition: Natural  Oral Hygiene: oral mucosa moist and clear of secretions  Lingual: No impairment  Velum: No impairment  Mandible: No impairment  P.O. Trials:  Patient Position: upright in chair  Vocal quality prior to P.O.: Hoarse  Consistency Presented: Thin liquid; Solid  How Presented: Self-fed/presented;Cup/sip;Straw;Successive swallows     Bolus Acceptance: No impairment  Bolus Formation/Control: No impairment     Propulsion: No impairment  Oral Residue: None  Initiation of Swallow: Delayed (# of seconds)  Laryngeal Elevation: Functional  Aspiration Signs/Symptoms: Strong cough(after large, subsequent sips of thin liquids)  Pharyngeal Phase Characteristics: No impairment, issues, or problems              Oral Phase Severity: No impairment  Pharyngeal Phase Severity : Mild  NOMS:   The NOMS functional outcome measure was used to quantify this patient's level of swallowing impairment. Based on the NOMS, the patient was determined to be at level 7 for swallow function     NOMS Swallowing Levels:  Level 1 (CN): NPO  Level 2 (CM): NPO but takes consistency in therapy  Level 3 (CL): Takes less than 50% of nutrition p.o. and continues with nonoral feedings; and/or safe with mod cues; and/or max diet restriction  Level 4 (CK): Safe swallow but needs mod cues; and/or mod diet restriction; and/or still requires some nonoral feeding/supplements  Level 5 (CJ): Safe swallow with min diet restriction; and/or needs min cues  Level 6 (CI): Independent with p.o.; rare cues; usually self cues; may need to avoid some foods or needs extra time  Level 7 ([address] [place] [address]): Independent for all p.o.  JIGNESH. (2003). National Outcomes Measurement System (NOMS): Adult Speech-Language Pathology User's Guide.        Pain:  Pain Scale 1: Numeric (0 - 10)  Pain Intensity 1: 0     After treatment:   Patient left in no apparent distress sitting up in chair, Call bell within reach and Nursing notified    COMMUNICATION/EDUCATION:       The patient's plan of care including recommendations, planned interventions, and recommended diet changes were discussed with: Registered Nurse. Yes: Posted safety precautions in patient's room. Patient is unable to participate in goal setting and plan of care.     Thank you for this referral.  MURPHY Bryson  Time Calculation: 19 mins

## 2019-12-11 NOTE — PROGRESS NOTES
[facility]  Hospitalist Group                                                                                          Hospitalist Progress Note  Taylor Vallecillo MD  Answering service: [phone] OR [phone] from in house phone        Date of Service:  2019  NAME:  Bina Pérez  :  1935  MRN:  [id number]      Admission Summary:     CC: altered mental state     HPI: 81 y.o man w/ afib, CVA, HTN, COPD, probable dementia, nursing home resident, who presents with AMS. He was reportedly more confused than baseline during the day. He was seen by [facility] and was diagnosed with a UTI based on a urine dipstick and cellulitis of the right arm. He was sent to the ED due to the increase in confusion. His son describes that he is confused at baseline and the severity is variable, and that he is back at baseline currently. He has not been formally diagnosed with dementia. No known fever or pain. The patient denies any complaints but he is a poor historian.       Interval history / Subjective:       Patient is seen and examined at bedside. He is awake and alert, oriented. Patient's son is by the bedside, hitting the patient has returned to his baseline. Patient is awake, eating his food, appears more calm and cooperative today. Overnight, his O2 sats improved, he is currently off BiPAP, requiring submental oxygen. Discussed nursing staff, patient is max assist, may require short-term placement for rehab, likely in a SNF setting. Patient currently resides at assisted living. He was afebrile, no nausea, no vomit. Speech eval noted. Assessment & Plan:     R arm cellulitis with R elbow effusion: improved  - s/p arthrocentesis .   - XR:  Nonspecific joint effusion. No evidence of fracture or osteomyelitis  - Ortho consult noted  - blood cx negative.  Fluid cx so far negative  - leucocytosis worsened today likely due to steroids   - venous duplex negative for DVT   - appreciate ID input  - stopped cefepime , stopped Vanc 12/10. Started on po Doxy x total 5 days   - needs arm elevated     Acute on chronic hypoxic respiratory failure - improving but CO2 stays high  Hx COPD on 2l home oxygen   - : AB.2/ >90/ 62  - CXR: worsening pulm edema  -  bnp 5,072  - echo shows normal LVEF  - appreciate pulmonology input   - inhaled bronchodilators brovana/ pulmicort/ duoneb   - cont PO steroids 12/10  - changed to po lasix 40mg daily ( home dose 20)  - Likely has chronic dementia and chronic CO2 retention. - not a candidate for trilogy as he has dementia  - Poor prognosis, agree with pulm, palliative care consult noted, plans for family meeting today    Acute metabolic encephalopathy - improved  Underlying dementia   - CT head: No evidence of acute infarct or intracranial hemorrhage. Periventricular white matter disease is likely secondary to chronic small vessel ischemic changes. Chronic left posterior parietal infarct. - Acute agitation on  pm s/p haldol   - supportive care    Chronic atrial fibrillation:   - rate controlled on diltiazem, metoprolol. Pharmacy to dose coumadin, INR daily    Supra therapeutic INR on poa- resolved. UTI ruled out   - this is based on an outpatient urine dipstick that showed +++ leukocyte esterase and + nitrite. - urine cx : negative     Hypokalemia - replaced     HTN: Bp stable. On lisinopril, diltiazem, metoprolol, lasix    History of alcohol abuse: remote, resolved per son  History of CVA: on aspirin, statin    Chronic diastolic CHF     DVT ppx: anticoagulated on coumaidn  Code status: DNR - son states he has a DDNR at home  Care Plan discussed with: Patient/Family  Disposition: TBD. Need SNF. Hospital Problems  Never Reviewed          Codes Class Noted POA    Cellulitis of right arm ICD-10-CM: L03.113  ICD-9-CM: 682.3  2019 Unknown                Review of Systems:   Review of systems not obtained due to patient factors. Vital Signs:    Last 24hrs VS reviewed since prior progress note. Most recent are:  Visit Vitals  /58   Pulse 95   Temp 98.5 °F (36.9 °C)   Resp 19   Ht 5' 10\" (1.778 m)   Wt 79.7 kg (175 lb 12.8 oz)   SpO2 98%   BMI 25.22 kg/m²         Intake/Output Summary (Last 24 hours) at 12/11/2019 1450  Last data filed at 12/11/2019 1000  Gross per 24 hour   Intake 240 ml   Output 150 ml   Net 90 ml        Physical Examination:             Constitutional:  No acute distress, confused, calm    ENT:  Oral mucous moist, oropharynx benign. Resp:  coarse breathing. No wheezing or rales   CV:  Regular rhythm, normal rate, no murmurs, gallops, rubs    GI:  Soft, non distended, non tender. Normoactive BS    Musculoskeletal:  warm, 2+ pulses throughout    Neurologic:  Moves all extremities. Demented      Psych: not agitated not anxious   Skin:  rt arm , mild erythematous - improving        Data Review:    Review and/or order of clinical lab test      Labs:     Recent Labs     12/10/19  0257 12/09/19  0311   WBC 16.9* 18.6*   HGB 11.7* 12.6   HCT 39.3 41.3    235     Recent Labs     12/10/19  0257 12/09/19  1003 12/09/19 0311    139 140   K 3.6 3.4* 3.2*   CL 94* 91* 93*   CO2 >45* >45* >45*   BUN 36* 29* 28*   CREA 0.99 1.14 1.09   * 187* 176*   CA 9.6 10.0 9.2     No results for input(s): SGOT, GPT, ALT, AP, TBIL, TBILI, TP, ALB, GLOB, GGT, AML, LPSE in the last 72 hours. No lab exists for component: AMYP, HLPSE  Recent Labs     12/11/19  0744 12/10/19  0257 12/09/19  0311   INR 1.4* 1.6* 2.0*   PTP 14.2* 15.6* 19.5*      No results for input(s): FE, TIBC, PSAT, FERR in the last 72 hours. No results found for: FOL, RBCF   No results for input(s): PH, PCO2, PO2 in the last 72 hours. No results for input(s): CPK, CKNDX, TROIQ in the last 72 hours.     No lab exists for component: CPKMB  No results found for: CHOL, CHOLX, CHLST, CHOLV, HDL, HDLP, LDL, LDLC, DLDLP, TGLX, TRIGL, TRIGP, CHHD, [facility]  Lab Results   Component Value Date/Time    Glucose (POC) 122 (H) 12/07/2019 08:28 AM     Lab Results   Component Value Date/Time    Color YELLOW/STRAW 11/27/2019 09:08 PM    Appearance CLOUDY (A) 11/27/2019 09:08 PM    Specific gravity 1.020 11/27/2019 09:08 PM    pH (UA) 6.0 11/27/2019 09:08 PM    Protein 100 (A) 11/27/2019 09:08 PM    Glucose NEGATIVE  11/27/2019 09:08 PM    Ketone TRACE (A) 11/27/2019 09:08 PM    Bilirubin NEGATIVE  11/27/2019 09:08 PM    Urobilinogen 1.0 11/27/2019 09:08 PM    Nitrites NEGATIVE  11/27/2019 09:08 PM    Leukocyte Esterase LARGE (A) 11/27/2019 09:08 PM    Epithelial cells FEW 11/27/2019 09:08 PM    Bacteria NEGATIVE  11/27/2019 09:08 PM    WBC >100 (H) 11/27/2019 09:08 PM    RBC >100 (H) 11/27/2019 09:08 PM         Medications Reviewed:     Current Facility-Administered Medications   Medication Dose Route Frequency    senna-docusate (PERICOLACE) 8.6-50 mg per tablet 1 Tab  1 Tab Oral DAILY    albuterol-ipratropium (DUO-NEB) 2.5 MG-0.5 MG/3 ML  3 mL Nebulization TID RT    warfarin (COUMADIN) tablet 4 mg  4 mg Oral ONCE    furosemide (LASIX) tablet 40 mg  40 mg Oral DAILY    predniSONE (DELTASONE) tablet 40 mg  40 mg Oral DAILY WITH BREAKFAST    doxycycline (VIBRA-TABS) tablet 100 mg  100 mg Oral Q12H    dilTIAZem (CARDIZEM) IR tablet 30 mg  30 mg Oral Q8H    metoprolol tartrate (LOPRESSOR) tablet 50 mg  50 mg Oral Q12H    melatonin tablet 3 mg  3 mg Oral QHS    influenza vaccine 2019-20 (6 mos+)(PF) (FLUARIX/FLULAVAL/FLUZONE QUAD) injection 0.5 mL  0.5 mL IntraMUSCular PRIOR TO DISCHARGE    Warfarin - Pharmacy to Dose   Other Rx Dosing/Monitoring    lactobac ac& pc-s.therm-b.anim (HELADIO Q/RISAQUAD)  1 Cap Oral DAILY    arformoterol (BROVANA) neb solution 15 mcg  15 mcg Nebulization BID RT    And    budesonide (PULMICORT) 500 mcg/2 ml nebulizer suspension  500 mcg Nebulization BID RT    traMADol (ULTRAM) tablet 50 mg  50 mg Oral Q6H PRN    hydrALAZINE (APRESOLINE) 20 mg/mL injection 20 mg  20 mg IntraVENous Q6H PRN    lisinopril (PRINIVIL, ZESTRIL) tablet 10 mg  10 mg Oral DAILY    sodium chloride (NS) flush 5-40 mL  5-40 mL IntraVENous Q8H    sodium chloride (NS) flush 5-40 mL  5-40 mL IntraVENous PRN    acetaminophen (TYLENOL) tablet 650 mg  650 mg Oral Q4H PRN    ondansetron (ZOFRAN) injection 4 mg  4 mg IntraVENous Q4H PRN    aspirin delayed-release tablet 81 mg  81 mg Oral DAILY    atorvastatin (LIPITOR) tablet 40 mg  40 mg Oral DAILY    thiamine HCL (B-1) tablet 100 mg  100 mg Oral DAILY     ______________________________________________________________________  EXPECTED LENGTH OF STAY: 4d 16h  ACTUAL LENGTH OF STAY:          14                 Diya Arizmendi MD

## 2019-12-11 NOTE — PROGRESS NOTES
Placed Pt. On BIPAP 16/8. Pt. Did not tolerate well ripped off BIPAP. Nurse notified. BIPAP of 16/8 on standby if needed. Pt. Resting comfortably on 2L NC.  Will check back

## 2019-12-11 NOTE — PROGRESS NOTES
Problem: Pressure Injury - Risk of  Goal: *Prevention of pressure injury  Description  Document Oscar Scale and appropriate interventions in the flowsheet. 12-9-19: turn q2h and float heels   Outcome: Progressing Towards Goal  Note:   Pressure Injury Interventions:  Sensory Interventions: Assess changes in LOC, Assess need for specialty bed, Avoid rigorous massage over bony prominences, Check visual cues for pain, Discuss PT/OT consult with provider, Keep linens dry and wrinkle-free, Maintain/enhance activity level, Pad between skin to skin, Turn and reposition approx. every two hours (pillows and wedges if needed)    Moisture Interventions: Absorbent underpads, Apply protective barrier, creams and emollients, Assess need for specialty bed, Check for incontinence Q2 hours and as needed, Internal/External urinary devices, Limit adult briefs, Maintain skin hydration (lotion/cream), Minimize layers, Moisture barrier    Activity Interventions: Assess need for specialty bed, Increase time out of bed, Pressure redistribution bed/mattress(bed type), PT/OT evaluation    Mobility Interventions: Assess need for specialty bed, Float heels, HOB 30 degrees or less, Pressure redistribution bed/mattress (bed type), PT/OT evaluation, Turn and reposition approx.  every two hours(pillow and wedges)    Nutrition Interventions: Document food/fluid/supplement intake, Discuss nutritional consult with provider, Offer support with meals,snacks and hydration    Friction and Shear Interventions: Apply protective barrier, creams and emollients, HOB 30 degrees or less, Lift team/patient mobility team, Minimize layers, Transfer aides:transfer board/Anny lift/ceiling lift     Problem: Chronic Obstructive Pulmonary Disease (COPD)  Goal: *Oxygen saturation during activity within specified parameters  Outcome: Progressing Towards Goal  Note:   Pt on 3L NC, oxygen saturations remain above 95%    Bedside shift change report given to SHANTE Uribe (oncoming nurse) by Jasmine Joseph RN (offgoing nurse). Report included the following information SBAR, Kardex, ED Summary, Intake/Output, MAR, Recent Results and Cardiac Rhythm Afib.

## 2019-12-11 NOTE — PROGRESS NOTES
SLP Contact Note    SLP evaluation complete. Patient cleared for regular diet/thin liquids with supervision to ensure small sips. Full note to follow.       Thank you,  ALETHEA AbbottEd, [id number] [facility], [place], operated by [facility]  Speech-Language Pathologist

## 2019-12-11 NOTE — PROGRESS NOTES
Palliative Medicine  [facility]: [phone]  [facility]: [phone] ([phone])        Code Status: DNR    Advance Care Planning:  Advance Care Planning 12/11/2019   Patient's Healthcare Decision Maker is: Named in scanned ACP document   Confirm Advance Directive Yes, on file   Does the patient have other document types Do Not Resuscitate   [facility]    Primary Decision Maker (Active): Vaishali Langston, Legal Guardian - [phone]    Secondary Decision Maker: Elly Reddy - Son      Patient / Family Encounter Documentation    Participants (names): Patient son Beverley Howard, Palliative Medicine ([address], Aliya NP)    Narrative:  Sofiya Aguilar ( pronounced \"Weece\") is an 81 y.o man w/ PMH sx for afib, CVA, HTN, COPD, probable dementia (no formal dx). He presented to ED with AMS, R arm cellulitis with R elbow effusion s/p joint aspiration. Patient's hospitalization has been marked with confusion, agitation, and he is currently being worked up for acute chronic hypercapnic resp failure secondary to O2 dependent COPD and HF with worsening ABG. Palliative medicine consult for care decisions    Aliya NP and I met with patient son in family meeting room. Patient awake and alert but confused, unable to participate in conversation. 1. Son provided AMD - now on chart to be scanned. [facility]   Primary Decision Maker (Active): Vaishali Langston, Legal Guardian - [phone]    Secondary Decision Maker: Elly Reddy - Son    Living Will  Patient does not want life-prolonging interventions at end of life. 2. We talked about how things had been going over past few years and most recently. Patient was hospitalized in Spring 2017 and Spring of 2018 for PNA. Patient usually receives care at [address] [place] [address] - no records at [facility]. Patient is in [place] and has support in the home throughout the day.  He gets around with a rollator (does not like ) and will take meals in community setting with the few men who live there. He is social at the [facility] and has friends, likes the staff. He eats 2 meals a day and goes out to eat with son 1 x week as well as trips on the bus. But mostly, patient watches TV. Per son, he never had hobbies. But he loves old Westerns. Son has noticed patient has been slowing down recently - running out of breath, more confused and tired. During spring allergy season, he received steroids and that seemed to perk him up. 3. Son acknowledges patients medical challenges related to COPD. We discussed 02 options - patient generally doesn't like bipap and son feels that design does not fit patient's face and way of sleeping (open mouth with slack lower jaw) - though would be interested in trilogy if available to patient. He understands that dx is progressive but patient has been able to bounce back pretty well in past and son feels that he needs to wait and see if he can do so this time. He's happy with care at current facility. 4. We talked a little about patient agitation and son's concerns about chemical restraints versus having someone gently remind patient that he's OK and safe, which seems to work well. Son also willing to come to hospital at any time of day or night to support patient when he is agitated. He told us seroquel has never worked for patient in past - he becomes \"comatose\" and it can take a day or two to get back to himself, unable to work with PT. Haldol also affects him strongly. Psycho: pleasant, confused  Social:  since  (wife  of ovarian cx on hospice);  lives in [address] [place] [address] 81 lives 3 miles from him. Patient has 2 other sons: Middle son Lesly Cohen is Danae Gonsalez and youngest son Kalli Iniguez - both in [place]. Patient sister Corbin Matt in [place]. Patient is from [place], moved to [address] [place] [address] in . He retired from work as a manager at [employer], before that he worked at [employer] and took accounting classes.   Baseline: Confused  (varies in severity); has trouble speaking due to previous stroke - can get frustrated with word retrieval but has worked with SLP in past so has tools; uses a walker for ambulation; 2L home O2       Psychosocial challenges/Resilience factors:     Patient has good family support and does not appear to have financial stressors. However son/MPOBAILEY De Los Santos does not seem to have much social support. Goals of Care / Plan:     Check on availability of trilogy    Son would like to see if patient can bounce back - seems realistic about patient's progressive illness but wants to give him a chance    AMD on chart to be scanned      Thank you for the opportunity to be involved in the care of Mr. Cesar Almodovar and his family.     Kena Sanchez, LILIANA, Supervisee in Social Work  Palliative Medicine   [phone]

## 2019-12-11 NOTE — PROGRESS NOTES
Follow-up visit attempted with Mr. Birchelio Harrison. Pt was sitting in chair resting and did not respond to 's presence. Chaplains are available for support as needed.     Cookie Javier, Palliative

## 2019-12-12 LAB
INR PPP: 1.5 (ref 0.9–1.1)
PROTHROMBIN TIME: 14.6 SEC (ref 9–11.1)

## 2019-12-12 PROCEDURE — 94664 DEMO&/EVAL PT USE INHALER: CPT

## 2019-12-12 PROCEDURE — 74011250636 HC RX REV CODE- 250/636: Performed by: INTERNAL MEDICINE

## 2019-12-12 PROCEDURE — 74011250637 HC RX REV CODE- 250/637: Performed by: INTERNAL MEDICINE

## 2019-12-12 PROCEDURE — 74011250637 HC RX REV CODE- 250/637: Performed by: HOSPITALIST

## 2019-12-12 PROCEDURE — 94660 CPAP INITIATION&MGMT: CPT

## 2019-12-12 PROCEDURE — 74011636637 HC RX REV CODE- 636/637: Performed by: INTERNAL MEDICINE

## 2019-12-12 PROCEDURE — 85610 PROTHROMBIN TIME: CPT

## 2019-12-12 PROCEDURE — 94640 AIRWAY INHALATION TREATMENT: CPT

## 2019-12-12 PROCEDURE — 97116 GAIT TRAINING THERAPY: CPT

## 2019-12-12 PROCEDURE — 36415 COLL VENOUS BLD VENIPUNCTURE: CPT

## 2019-12-12 PROCEDURE — 65270000029 HC RM PRIVATE

## 2019-12-12 PROCEDURE — 74011000250 HC RX REV CODE- 250: Performed by: INTERNAL MEDICINE

## 2019-12-12 PROCEDURE — 77010033678 HC OXYGEN DAILY

## 2019-12-12 PROCEDURE — 97530 THERAPEUTIC ACTIVITIES: CPT

## 2019-12-12 PROCEDURE — 94760 N-INVAS EAR/PLS OXIMETRY 1: CPT

## 2019-12-12 RX ORDER — WARFARIN 4 MG/1
4 TABLET ORAL ONCE
Status: COMPLETED | OUTPATIENT
Start: 2019-12-12 | End: 2019-12-12

## 2019-12-12 RX ORDER — FUROSEMIDE 10 MG/ML
40 INJECTION INTRAMUSCULAR; INTRAVENOUS ONCE
Status: COMPLETED | OUTPATIENT
Start: 2019-12-12 | End: 2019-12-12

## 2019-12-12 RX ADMIN — Medication 10 ML: at 14:00

## 2019-12-12 RX ADMIN — DILTIAZEM HYDROCHLORIDE 30 MG: 30 TABLET, FILM COATED ORAL at 13:42

## 2019-12-12 RX ADMIN — DOXYCYCLINE HYCLATE 100 MG: 100 TABLET, COATED ORAL at 10:15

## 2019-12-12 RX ADMIN — ASPIRIN 81 MG: 81 TABLET, COATED ORAL at 10:16

## 2019-12-12 RX ADMIN — IPRATROPIUM BROMIDE AND ALBUTEROL SULFATE 3 ML: .5; 3 SOLUTION RESPIRATORY (INHALATION) at 21:30

## 2019-12-12 RX ADMIN — PREDNISONE 40 MG: 20 TABLET ORAL at 10:15

## 2019-12-12 RX ADMIN — WARFARIN SODIUM 4 MG: 4 TABLET ORAL at 16:58

## 2019-12-12 RX ADMIN — Medication 1 CAPSULE: at 10:15

## 2019-12-12 RX ADMIN — METOPROLOL TARTRATE 50 MG: 50 TABLET ORAL at 10:16

## 2019-12-12 RX ADMIN — METOPROLOL TARTRATE 50 MG: 50 TABLET ORAL at 21:21

## 2019-12-12 RX ADMIN — DOXYCYCLINE HYCLATE 100 MG: 100 TABLET, COATED ORAL at 21:20

## 2019-12-12 RX ADMIN — ATORVASTATIN CALCIUM 40 MG: 40 TABLET, FILM COATED ORAL at 10:16

## 2019-12-12 RX ADMIN — DILTIAZEM HYDROCHLORIDE 30 MG: 30 TABLET, FILM COATED ORAL at 06:38

## 2019-12-12 RX ADMIN — Medication 10 ML: at 21:21

## 2019-12-12 RX ADMIN — LISINOPRIL 10 MG: 10 TABLET ORAL at 10:16

## 2019-12-12 RX ADMIN — FUROSEMIDE 40 MG: 40 TABLET ORAL at 10:16

## 2019-12-12 RX ADMIN — BUDESONIDE 500 MCG: 0.5 INHALANT RESPIRATORY (INHALATION) at 21:30

## 2019-12-12 RX ADMIN — FUROSEMIDE 40 MG: 10 INJECTION, SOLUTION INTRAMUSCULAR; INTRAVENOUS at 13:42

## 2019-12-12 RX ADMIN — STANDARDIZED SENNA CONCENTRATE AND DOCUSATE SODIUM 1 TABLET: 8.6; 5 TABLET ORAL at 10:15

## 2019-12-12 RX ADMIN — DILTIAZEM HYDROCHLORIDE 30 MG: 30 TABLET, FILM COATED ORAL at 21:21

## 2019-12-12 RX ADMIN — IPRATROPIUM BROMIDE AND ALBUTEROL SULFATE 3 ML: .5; 3 SOLUTION RESPIRATORY (INHALATION) at 07:30

## 2019-12-12 RX ADMIN — Medication 10 ML: at 06:37

## 2019-12-12 RX ADMIN — IPRATROPIUM BROMIDE AND ALBUTEROL SULFATE 3 ML: .5; 3 SOLUTION RESPIRATORY (INHALATION) at 14:25

## 2019-12-12 RX ADMIN — MELATONIN 3 MG: at 21:21

## 2019-12-12 RX ADMIN — BUDESONIDE 500 MCG: 0.5 INHALANT RESPIRATORY (INHALATION) at 07:30

## 2019-12-12 RX ADMIN — Medication 100 MG: at 10:15

## 2019-12-12 NOTE — PROGRESS NOTES
Problem: Falls - Risk of  Goal: *Absence of Falls  Description  Document [facility] Fall Risk and appropriate interventions in the flowsheet. Outcome: Progressing Towards Goal  Note:   Fall Risk Interventions:  Bed is in the lowest position and wheels are locked, call bell is within reach, bathroom light is on during evening hours, gripper socks are on and patient has been instructed to call out for assistance if needed. As of now, patient is free from falls and will continue to be monitored. Problem: Chronic Obstructive Pulmonary Disease (COPD)  Goal: *Absence of hypoxia  Outcome: Progressing Towards Goal  Note:   Patient is on 2L NC which is his baseline and his O2 saturations are remaining above 90%     Problem: Nutrition Deficit  Goal: *Optimize nutritional status  Outcome: Progressing Towards Goal  Note:   Patient was cleared by speech today and was able to eat dinner.  Patient also had snacks of applesauce and chocolate ice cream

## 2019-12-12 NOTE — PROGRESS NOTES
Problem: Mobility Impaired (Adult and Pediatric)  Goal: *Acute Goals and Plan of Care (Insert Text)  Description  FUNCTIONAL STATUS PRIOR TO ADMISSION: Patient resides in assisted living and has dementia. Pt was unable to provide prior level of function due to dementia. HOME SUPPORT PRIOR TO ADMISSION: Pt lives in assisted living facility. Physical Therapy Goals  Revisited 12/10/2019, not met, remain appropriate, carry over        Physical Therapy Goals  Initiated 12/3/2019  1. Patient will move from supine to sit and sit to supine  in bed with supervision within 7 day(s). 2.  Patient will transfer from bed to chair and chair to bed with minimal assistance/contact guard assist using the least restrictive device within 7 day(s). 3.  Patient will perform sit to stand with minimal assistance/contact guard assist within 7 day(s). 4.  Patient will ambulate with minimal assistance/contact guard assist for 75 feet with the least restrictive device within 7 day(s). Outcome: Progressing slowly Towards Goal      PHYSICAL THERAPY TREATMENT  Patient: Jeffy Gibbons (61 y.o. male)  Date: 12/12/2019  Diagnosis: Cellulitis of right arm [L03.113]   <principal problem not specified>       Precautions: Fall, Skin, bed alarm  Chart, physical therapy assessment, plan of care and goals were reviewed. ASSESSMENT  Patient continues with skilled PT services and is slowly progressing towards goals. He remains confused although pleasantly so. Pt has word finding difficulties but at times spoke in clear concise sentences, see subjective. Pt continues to be limited by impaired sitting and standing balance with tendency for posterior lean in sitting and slow progressive flexion of his knees in standing (even with walker support). During bed to chair today he began to sit in the chair before he was fully lined up in front of it and required lifting and guiding of his hips. Did several reps of sit to stand.  Vital signs monitored but getting 02 sats is difficult, probe on pt's toe because of cold hands. Continue to recommend rehab. Current Level of Function Impacting Discharge (mobility/balance): up to mod to max assist X 2., impaired sitting and standing balance     Other factors to consider for discharge: far from baseline of mod I using a walker. Uses no supplemental 02 at baseline and is on 2 liters of 02 now. High fall risk. Vitals:       12/12/19 1523 12/12/19 1530 12/12/19 1546   BP:   95/58 117/62 114/51   BP 1 Location:   Left arm Left arm Left arm   BP Patient Position:   Supine;Pre-activity Sitting Sitting;Post activity   Pulse:   88 92 97   Resp:           Temp:           SpO2: on 2 liters of 02   91%                                             PLAN :  Patient continues to benefit from skilled intervention to address the above impairments. Continue treatment per established plan of care. to address goals. Recommendation for discharge: (in order for the patient to meet his/her long term goals)  Therapy up to 5 days/week in SNF setting    This discharge recommendation:  A follow-up discussion with the attending provider and/or case management is planned    IF patient discharges home will need the following DME: to be determined (TBD)       SUBJECTIVE:   Patient stated Ck Mcintosh was this hospital built?     OBJECTIVE DATA SUMMARY:   Chart checked, pt cleared by nursing  Critical Behavior:  Neurologic State: Alert, Confused  Orientation Level: Oriented to person, Disoriented to place, Disoriented to situation, Disoriented to time  Cognition: Follows commands  Safety/Judgement: Decreased awareness of environment, Decreased awareness of need for assistance, Decreased awareness of need for safety  Functional Mobility Training:  Bed Mobility:     Supine to Sit: Maximum assistance;Assist x2              Transfers:  Sit to Stand:  Moderate assistance;Assist x2;Minimum assistance  Stand to Sit: Moderate assistance;Assist x2;Maximum assistance        Bed to Chair: Moderate assistance;Assist x2                    Balance:  Sitting: Impaired; Without support  Sitting - Static: Poor (constant support)  Sitting - Dynamic: Poor (constant support)  Standing: Impaired; With support  Standing - Static: Constant support;Poor  Standing - Dynamic : Constant support;Poor  Ambulation/Gait Training:                                                        Stairs: Therapeutic Exercises:     Pain Rating:  None mentioned    Activity Tolerance:   Fair and requires rest breaks  Please refer to the flowsheet for vital signs taken during this treatment.     After treatment patient left in no apparent distress:   Sitting in chair reclined, Call bell within reach, and Bed / chair alarm activated    COMMUNICATION/COLLABORATION:   The patients plan of care was discussed with: Registered Nurse    Tran Perdue   Time Calculation: 33 mins

## 2019-12-12 NOTE — PROGRESS NOTES
Pharmacist Note - Warfarin Dosing  Consult provided for this 84 y.o.male to manage warfarin for Atrial Fibrillation  INR Goal: 2 - 3  Home regimen/ tablet size: 4 mg daily  Drugs that may increase INR: Doxycycline, Prednisone  Drugs that may decrease INR: None  Other current anticoagulants/ drugs that may increase bleeding risk: Aspirin  Risk factors: Age > 65  Daily INR ordered: YES    Recent Labs     12/12/19  0413 12/11/19  0744 12/10/19  0257   HGB  --   --  11.7*   INR 1.5* 1.4* 1.6*     Date               INR                  Dose  11/27  2.7  4 mg (took PTA)   11/28  2.6  4 mg   11/29  3  2.5 mg   11/30               2.7                  3 mg  12/01               4.2                  Held  12/02  4.4  Held  -- Consult discontinued - Reconsulted 12/04 --  12/04  3.6  Hold   12/05  2.9  3 mg  12/06  2.3   2.5 mg  12/07  2.0  3 mg  12/08  2.3  2.5 mg  12/9                2                      3 mg--dose not given (patient refused)  12/10             1.6                    4 mg  12/11             1.4                    4 mg  12/12  1.5  4 mg                                                                                 Assessment/ Plan:  Warfarin 4 mg PO x 1 as per home regimen. Pharmacy will continue to monitor daily and adjust therapy as indicated. Please contact the pharmacist at  for outpatient recommendations if needed.

## 2019-12-12 NOTE — PROGRESS NOTES
[facility] [facility] Adult  Hospitalist Group                                                                                          Hospitalist Progress Note  Alize Fernandez MD  Answering service: [phone] OR [phone] from in house phone        Date of Service:  2019  NAME:  Magy Rowe  :  1935  MRN:  [id number]      Admission Summary:     CC: altered mental state     HPI: 81 y.o man w/ afib, CVA, HTN, COPD, probable dementia, nursing home resident, who presents with AMS. He was reportedly more confused than baseline during the day. He was seen by [facility] and was diagnosed with a UTI based on a urine dipstick and cellulitis of the right arm. He was sent to the ED due to the increase in confusion. His son describes that he is confused at baseline and the severity is variable, and that he is back at baseline currently. He has not been formally diagnosed with dementia. No known fever or pain. The patient denies any complaints but he is a poor historian.       Interval history / Subjective:       Patient seen for follow-up of acute encephalopathy. Patient seen and examined by the bedside, Labs, images and notes reviewed  Patient is awake, alert, oriented to self. This appears to be his baseline. I discussed with patient's son over the phone and along with pulmonology team.  Patient would not want to use BiPAP and was noncompliant in the hospital and I doubt that trilogy will help in the long run. Both the son and the pulmonology team agrees to hold off of trilogy for now. Patient follows up with pulmonology as an outpatient every 6 months, son stated that he will discuss with patient's primary pulmonologist at that time if indicated, will consider trilogy but right now he is concerned that he will get more confused due to it. Patient is hemodynamically stable, will transfer to floor, await placement to SNF.   No N/V/D  Discussed with nursing staff, no acute issues overnight, orders reviewed. Assessment & Plan:     R arm cellulitis with R elbow effusion: improved  - s/p arthrocentesis .   - XR:  Nonspecific joint effusion. No evidence of fracture or osteomyelitis  - Ortho consult noted  - blood cx negative. Fluid cx so far negative  - leucocytosis worsened today likely due to steroids   - venous duplex negative for DVT   - appreciate ID input  - stopped cefepime , stopped Vanc 12/10. Started on po Doxy x total 5 days   - needs arm elevated     Acute on chronic hypoxic respiratory failure - improving but CO2 stays high  Hx COPD on 2l home oxygen  Acute on Chronic diastolic CHF, NYHA III on admisison  - : AB.2/ >90/ 62  -  bnp 5,072  - echo shows normal LVEF  - appreciate pulmonology input   - inhaled bronchodilators brovana/ pulmicort/ duoneb   - cont PO steroids 12/10  - changed to po lasix 40mg daily ( home dose 20)  - Likely has chronic dementia and chronic CO2 retention. - not a candidate for trilogy as he has dementia  - palliative care consult noted. Await placement. Acute metabolic encephalopathy - improved  Underlying dementia   - CT head: No evidence of acute infarct or intracranial hemorrhage. Periventricular white matter disease is likely secondary to chronic small vessel ischemic changes. Chronic left posterior parietal infarct. - Acute agitation on  pm s/p haldol - no recurrence since then  - supportive care    Chronic atrial fibrillation:   - rate controlled on diltiazem, metoprolol. Pharmacy to dose coumadin, INR daily    Supra therapeutic INR on poa- resolved. UTI ruled out     Hypokalemia - replaced     HTN: Bp stable. On lisinopril, diltiazem, metoprolol, lasix    History of alcohol abuse: remote, resolved per son  History of CVA: on aspirin, statin       DVT ppx: anticoagulated on coumaidn  Code status: DNR - son states he has a DDNR at home  Care Plan discussed with: Patient/Family  Disposition: TBD. Need SNF.       Hospital Problems  Never Reviewed          Codes Class Noted POA    Cellulitis of right arm ICD-10-CM: L03.113  ICD-9-CM: 682.3  11/27/2019 Unknown                Review of Systems:   Review of systems not obtained due to patient factors. Vital Signs:    Last 24hrs VS reviewed since prior progress note. Most recent are:  Visit Vitals  /50   Pulse 97   Temp 97.6 °F (36.4 °C)   Resp 23   Ht 5' 10\" (1.778 m)   Wt 83.4 kg (183 lb 14.4 oz)   SpO2 90%   BMI 26.39 kg/m²         Intake/Output Summary (Last 24 hours) at 12/12/2019 1414  Last data filed at 12/11/2019 1700  Gross per 24 hour   Intake 100 ml   Output    Net 100 ml        Physical Examination:             Constitutional:  No acute distress, confused, calm    ENT:  Oral mucous moist, oropharynx benign. Resp:  coarse breathing. No wheezing or rales        GI:  Soft, non distended, non tender. Normoactive BS    Musculoskeletal:  warm, 2+ pulses throughout    Neurologic:  Moves all extremities. Demented      Psych: not agitated not anxious   Skin:  rt arm , mild erythematous, much improved       Data Review:    Review and/or order of clinical lab test      Labs:     Recent Labs     12/10/19  0257   WBC 16.9*   HGB 11.7*   HCT 39.3        Recent Labs     12/10/19  0257      K 3.6   CL 94*   CO2 >45*   BUN 36*   CREA 0.99   *   CA 9.6     No results for input(s): SGOT, GPT, ALT, AP, TBIL, TBILI, TP, ALB, GLOB, GGT, AML, LPSE in the last 72 hours. No lab exists for component: AMYP, HLPSE  Recent Labs     12/12/19  0413 12/11/19  0744 12/10/19  0257   INR 1.5* 1.4* 1.6*   PTP 14.6* 14.2* 15.6*      No results for input(s): FE, TIBC, PSAT, FERR in the last 72 hours. No results found for: FOL, RBCF   No results for input(s): PH, PCO2, PO2 in the last 72 hours. No results for input(s): CPK, CKNDX, TROIQ in the last 72 hours.     No lab exists for component: CPKMB  No results found for: CHOL, CHOLX, CHLST, CHOLV, HDL, HDLP, LDL, LDLC, DLDLP, TGLX, TRIGL, Frankey Kirsch [facility], [facility]  Lab Results   Component Value Date/Time    Glucose (POC) 122 (H) 12/07/2019 08:28 AM     Lab Results   Component Value Date/Time    Color YELLOW/STRAW 11/27/2019 09:08 PM    Appearance CLOUDY (A) 11/27/2019 09:08 PM    Specific gravity 1.020 11/27/2019 09:08 PM    pH (UA) 6.0 11/27/2019 09:08 PM    Protein 100 (A) 11/27/2019 09:08 PM    Glucose NEGATIVE  11/27/2019 09:08 PM    Ketone TRACE (A) 11/27/2019 09:08 PM    Bilirubin NEGATIVE  11/27/2019 09:08 PM    Urobilinogen 1.0 11/27/2019 09:08 PM    Nitrites NEGATIVE  11/27/2019 09:08 PM    Leukocyte Esterase LARGE (A) 11/27/2019 09:08 PM    Epithelial cells FEW 11/27/2019 09:08 PM    Bacteria NEGATIVE  11/27/2019 09:08 PM    WBC >100 (H) 11/27/2019 09:08 PM    RBC >100 (H) 11/27/2019 09:08 PM         Medications Reviewed:     Current Facility-Administered Medications   Medication Dose Route Frequency    warfarin (COUMADIN) tablet 4 mg  4 mg Oral ONCE    senna-docusate (PERICOLACE) 8.6-50 mg per tablet 1 Tab  1 Tab Oral DAILY    albuterol-ipratropium (DUO-NEB) 2.5 MG-0.5 MG/3 ML  3 mL Nebulization TID RT    furosemide (LASIX) tablet 40 mg  40 mg Oral DAILY    predniSONE (DELTASONE) tablet 40 mg  40 mg Oral DAILY WITH BREAKFAST    doxycycline (VIBRA-TABS) tablet 100 mg  100 mg Oral Q12H    dilTIAZem (CARDIZEM) IR tablet 30 mg  30 mg Oral Q8H    metoprolol tartrate (LOPRESSOR) tablet 50 mg  50 mg Oral Q12H    melatonin tablet 3 mg  3 mg Oral QHS    influenza vaccine 2019-20 (6 mos+)(PF) (FLUARIX/FLULAVAL/FLUZONE QUAD) injection 0.5 mL  0.5 mL IntraMUSCular PRIOR TO DISCHARGE    Warfarin - Pharmacy to Dose   Other Rx Dosing/Monitoring    lactobac ac& pc-s.therm-b.anim (HELADIO Q/RISAQUAD)  1 Cap Oral DAILY    arformoterol (BROVANA) neb solution 15 mcg  15 mcg Nebulization BID RT    And    budesonide (PULMICORT) 500 mcg/2 ml nebulizer suspension  500 mcg Nebulization BID RT    traMADol (ULTRAM) tablet 50 mg  50 mg Oral Q6H PRN    hydrALAZINE (APRESOLINE) 20 mg/mL injection 20 mg  20 mg IntraVENous Q6H PRN    lisinopril (PRINIVIL, ZESTRIL) tablet 10 mg  10 mg Oral DAILY    sodium chloride (NS) flush 5-40 mL  5-40 mL IntraVENous Q8H    sodium chloride (NS) flush 5-40 mL  5-40 mL IntraVENous PRN    acetaminophen (TYLENOL) tablet 650 mg  650 mg Oral Q4H PRN    ondansetron (ZOFRAN) injection 4 mg  4 mg IntraVENous Q4H PRN    aspirin delayed-release tablet 81 mg  81 mg Oral DAILY    atorvastatin (LIPITOR) tablet 40 mg  40 mg Oral DAILY    thiamine HCL (B-1) tablet 100 mg  100 mg Oral DAILY     ______________________________________________________________________  EXPECTED LENGTH OF STAY: 4d 16h  ACTUAL LENGTH OF STAY:          15                 Irene Costa MD

## 2019-12-12 NOTE — PROGRESS NOTES
Bedside shift change report given to [facility] RN (oncoming nurse) Maggie Root RN (offgoing nurse). Report included the following information SBAR and Kardex, A-Fib and intake/output.

## 2019-12-12 NOTE — PROGRESS NOTES
Pulmonary, Critical Care, and Sleep Medicine~Progress Note    Name: Tatiana Paget MRN: [id number]   : 1935 Hospital: [facility]    Date: 2019 9:59 AM Admission: 2019     Impression Plan   1. Acute on chronic hypercapnic resp failure secondary to O2 dependent COPD and HF  2. A fib with RVR  3. Metabolic encephalopathy; secondary to CO2. Seems to be improving with NIV  4. Dementia   5. DNR  1. He is not compliant with NIV here in hospital. Often he takes it off and other times refuses it when offered at night. Trilogy again cannot benefit someone if they do not use it. Discussed with hospitalist today. 2. Diuretics as you are  3. Doxy empiric   4. Duonebs/brovana/pulmicort   5. steroids. 6. Warfarin   7. O2 titration around 90%   8. Hypercapnia will be an ongoing issue. 9. palliative care is involved      Daily Progression:    Seems more alert today     I have reviewed the labs and previous days notes. Pertinent items are noted in HPI. OBJECTIVE:     Vital Signs:       Visit Vitals  /67   Pulse (!) 103   Temp 97.9 °F (36.6 °C)   Resp 20   Ht 5' 10\" (1.778 m)   Wt 83.4 kg (183 lb 14.4 oz)   SpO2 92%   BMI 26.39 kg/m²      Temp (24hrs), Av.2 °F (36.8 °C), Min:97.2 °F (36.2 °C), Max:98.8 °F (37.1 °C)     Intake/Output:     Last shift: No intake/output data recorded.     Last 3 shifts: 12/10 1901 -  0700  In: 580 [P.O.:580]  Out: 150 [Urine:150]          Intake/Output Summary (Last 24 hours) at 2019 1057  Last data filed at 2019 1700  Gross per 24 hour   Intake 340 ml   Output    Net 340 ml       Physical Exam:                                        Exam Findings Other   General: No resp distress noted, appears stated age    HEENT:  No ulcers, JVD not elevated, no cervical LAD    Chest: No pectus deformity, normal chest rise b/l    HEART:  RRR, no murmurs/rubs/gallops    Lungs: diminished BS at bases    ABD: Soft/NT, non rigid mildly distended    EXT: No cyanosis/clubbing/edema, normal peripheral pulses    Skin: No rashes or ulcers, no mottling    Neuro: Resting in bed         Medications:  Current Facility-Administered Medications   Medication Dose Route Frequency    warfarin (COUMADIN) tablet 4 mg  4 mg Oral ONCE    furosemide (LASIX) injection 40 mg  40 mg IntraVENous ONCE    senna-docusate (PERICOLACE) 8.6-50 mg per tablet 1 Tab  1 Tab Oral DAILY    albuterol-ipratropium (DUO-NEB) 2.5 MG-0.5 MG/3 ML  3 mL Nebulization TID RT    furosemide (LASIX) tablet 40 mg  40 mg Oral DAILY    predniSONE (DELTASONE) tablet 40 mg  40 mg Oral DAILY WITH BREAKFAST    doxycycline (VIBRA-TABS) tablet 100 mg  100 mg Oral Q12H    dilTIAZem (CARDIZEM) IR tablet 30 mg  30 mg Oral Q8H    metoprolol tartrate (LOPRESSOR) tablet 50 mg  50 mg Oral Q12H    melatonin tablet 3 mg  3 mg Oral QHS    influenza vaccine 2019-20 (6 mos+)(PF) (FLUARIX/FLULAVAL/FLUZONE QUAD) injection 0.5 mL  0.5 mL IntraMUSCular PRIOR TO DISCHARGE    Warfarin - Pharmacy to Dose   Other Rx Dosing/Monitoring    lactobac ac& pc-s.therm-b.anim (HELADIO Q/RISAQUAD)  1 Cap Oral DAILY    arformoterol (BROVANA) neb solution 15 mcg  15 mcg Nebulization BID RT    And    budesonide (PULMICORT) 500 mcg/2 ml nebulizer suspension  500 mcg Nebulization BID RT    traMADol (ULTRAM) tablet 50 mg  50 mg Oral Q6H PRN    hydrALAZINE (APRESOLINE) 20 mg/mL injection 20 mg  20 mg IntraVENous Q6H PRN    lisinopril (PRINIVIL, ZESTRIL) tablet 10 mg  10 mg Oral DAILY    sodium chloride (NS) flush 5-40 mL  5-40 mL IntraVENous Q8H    sodium chloride (NS) flush 5-40 mL  5-40 mL IntraVENous PRN    acetaminophen (TYLENOL) tablet 650 mg  650 mg Oral Q4H PRN    ondansetron (ZOFRAN) injection 4 mg  4 mg IntraVENous Q4H PRN    aspirin delayed-release tablet 81 mg  81 mg Oral DAILY    atorvastatin (LIPITOR) tablet 40 mg  40 mg Oral DAILY    thiamine HCL (B-1) tablet 100 mg  100 mg Oral DAILY       Labs:  ABG Recent Labs     12/10/19  1510 12/10/19  1502 12/10/19  0450 12/09/19  1706   PHI 7.382 7.376 7.450 7.378   PCO2I >90.0* >90.0* 84.1* >90.0*   PO2I 64* 63* 90 93   HCO3I  --   --  58.5*  --    SO2I  --   --  97  --    FIO2I  --   --   --  40        CBC Recent Labs     12/10/19  0257   WBC 16.9*   HGB 11.7*   HCT 39.3      .3*   MCH 37.0        Metabolic  Panel Recent Labs     12/12/19  0413 12/11/19  0744 12/10/19  0257   NA  --   --  143   K  --   --  3.6   CL  --   --  94*   CO2  --   --  >45*   GLU  --   --  153*   BUN  --   --  36*   CREA  --   --  0.99   CA  --   --  9.6   INR 1.5* 1.4* 1.6*        Pertinent Labs                Reid Suarez PA-C  12/12/2019

## 2019-12-12 NOTE — PROGRESS NOTES
Full note to follow.  Lori Pastrana, PT      Vitals:     12/12/19 1523 12/12/19 1530 12/12/19 1546   BP:  95/58 117/62 114/51   BP 1 Location:  Left arm Left arm Left arm   BP Patient Position:  Supine;Pre-activity Sitting Sitting;Post activity   Pulse:  88 92 97   Resp:       Temp:       SpO2: on 2 liters of 02  91%

## 2019-12-13 VITALS
HEART RATE: 96 BPM | DIASTOLIC BLOOD PRESSURE: 75 MMHG | BODY MASS INDEX: 22.15 KG/M2 | WEIGHT: 154.7 LBS | HEIGHT: 70 IN | OXYGEN SATURATION: 91 % | RESPIRATION RATE: 18 BRPM | SYSTOLIC BLOOD PRESSURE: 127 MMHG | TEMPERATURE: 98.4 F

## 2019-12-13 PROBLEM — I50.30 DIASTOLIC CHF (HCC): Status: ACTIVE | Noted: 2019-12-13

## 2019-12-13 LAB
INR PPP: 1.7 (ref 0.9–1.1)
PROTHROMBIN TIME: 17 SEC (ref 9–11.1)

## 2019-12-13 PROCEDURE — 97535 SELF CARE MNGMENT TRAINING: CPT

## 2019-12-13 PROCEDURE — 97530 THERAPEUTIC ACTIVITIES: CPT

## 2019-12-13 PROCEDURE — 85610 PROTHROMBIN TIME: CPT

## 2019-12-13 PROCEDURE — 74011250637 HC RX REV CODE- 250/637: Performed by: INTERNAL MEDICINE

## 2019-12-13 PROCEDURE — 97116 GAIT TRAINING THERAPY: CPT

## 2019-12-13 PROCEDURE — 74011000250 HC RX REV CODE- 250: Performed by: INTERNAL MEDICINE

## 2019-12-13 PROCEDURE — 74011636637 HC RX REV CODE- 636/637: Performed by: INTERNAL MEDICINE

## 2019-12-13 PROCEDURE — 74011250637 HC RX REV CODE- 250/637: Performed by: HOSPITALIST

## 2019-12-13 PROCEDURE — 90471 IMMUNIZATION ADMIN: CPT

## 2019-12-13 PROCEDURE — 90686 IIV4 VACC NO PRSV 0.5 ML IM: CPT | Performed by: INTERNAL MEDICINE

## 2019-12-13 PROCEDURE — 36415 COLL VENOUS BLD VENIPUNCTURE: CPT

## 2019-12-13 PROCEDURE — 65270000032 HC RM SEMIPRIVATE

## 2019-12-13 PROCEDURE — 74011250636 HC RX REV CODE- 250/636: Performed by: INTERNAL MEDICINE

## 2019-12-13 PROCEDURE — 94640 AIRWAY INHALATION TREATMENT: CPT

## 2019-12-13 RX ORDER — BUDESONIDE 0.5 MG/2ML
500 INHALANT ORAL 2 TIMES DAILY
Qty: 60 EACH | Refills: 0 | Status: SHIPPED
Start: 2019-12-13 | End: 2020-01-12

## 2019-12-13 RX ORDER — PREDNISONE 20 MG/1
40 TABLET ORAL
Qty: 4 TAB | Refills: 0 | Status: SHIPPED
Start: 2019-12-14 | End: 2019-12-16

## 2019-12-13 RX ORDER — DOXYCYCLINE HYCLATE 100 MG
100 TABLET ORAL EVERY 12 HOURS
Qty: 4 TAB | Refills: 0 | Status: SHIPPED
Start: 2019-12-13 | End: 2019-12-15

## 2019-12-13 RX ORDER — FUROSEMIDE 20 MG/1
40 TABLET ORAL DAILY
Qty: 60 TAB | Refills: 0 | Status: SHIPPED
Start: 2019-12-13 | End: 2020-01-12

## 2019-12-13 RX ORDER — LISINOPRIL 5 MG/1
10 TABLET ORAL DAILY
Qty: 10 TAB | Refills: 0 | Status: SHIPPED
Start: 2019-12-13

## 2019-12-13 RX ORDER — WARFARIN SODIUM 5 MG/1
5 TABLET ORAL ONCE
Status: COMPLETED | OUTPATIENT
Start: 2019-12-13 | End: 2019-12-13

## 2019-12-13 RX ADMIN — ASPIRIN 81 MG: 81 TABLET, COATED ORAL at 10:06

## 2019-12-13 RX ADMIN — ARFORMOTEROL TARTRATE 15 MCG: 15 SOLUTION RESPIRATORY (INHALATION) at 19:04

## 2019-12-13 RX ADMIN — METOPROLOL TARTRATE 50 MG: 50 TABLET ORAL at 10:06

## 2019-12-13 RX ADMIN — FUROSEMIDE 40 MG: 40 TABLET ORAL at 10:06

## 2019-12-13 RX ADMIN — IPRATROPIUM BROMIDE AND ALBUTEROL SULFATE 3 ML: .5; 3 SOLUTION RESPIRATORY (INHALATION) at 14:41

## 2019-12-13 RX ADMIN — Medication 100 MG: at 10:05

## 2019-12-13 RX ADMIN — BUDESONIDE 500 MCG: 0.5 INHALANT RESPIRATORY (INHALATION) at 09:14

## 2019-12-13 RX ADMIN — IPRATROPIUM BROMIDE AND ALBUTEROL SULFATE 3 ML: .5; 3 SOLUTION RESPIRATORY (INHALATION) at 09:14

## 2019-12-13 RX ADMIN — BUDESONIDE 500 MCG: 0.5 INHALANT RESPIRATORY (INHALATION) at 19:04

## 2019-12-13 RX ADMIN — DOXYCYCLINE HYCLATE 100 MG: 100 TABLET, COATED ORAL at 20:11

## 2019-12-13 RX ADMIN — DILTIAZEM HYDROCHLORIDE 30 MG: 30 TABLET, FILM COATED ORAL at 15:15

## 2019-12-13 RX ADMIN — Medication 1 CAPSULE: at 10:06

## 2019-12-13 RX ADMIN — METOPROLOL TARTRATE 50 MG: 50 TABLET ORAL at 20:10

## 2019-12-13 RX ADMIN — WARFARIN SODIUM 5 MG: 5 TABLET ORAL at 16:57

## 2019-12-13 RX ADMIN — DILTIAZEM HYDROCHLORIDE 30 MG: 30 TABLET, FILM COATED ORAL at 05:54

## 2019-12-13 RX ADMIN — ATORVASTATIN CALCIUM 40 MG: 40 TABLET, FILM COATED ORAL at 10:06

## 2019-12-13 RX ADMIN — Medication 10 ML: at 21:51

## 2019-12-13 RX ADMIN — DILTIAZEM HYDROCHLORIDE 30 MG: 30 TABLET, FILM COATED ORAL at 21:51

## 2019-12-13 RX ADMIN — Medication 10 ML: at 15:16

## 2019-12-13 RX ADMIN — PREDNISONE 40 MG: 20 TABLET ORAL at 07:07

## 2019-12-13 RX ADMIN — STANDARDIZED SENNA CONCENTRATE AND DOCUSATE SODIUM 1 TABLET: 8.6; 5 TABLET ORAL at 10:06

## 2019-12-13 RX ADMIN — INFLUENZA VIRUS VACCINE 0.5 ML: 15; 15; 15; 15 SUSPENSION INTRAMUSCULAR at 19:33

## 2019-12-13 RX ADMIN — DOXYCYCLINE HYCLATE 100 MG: 100 TABLET, COATED ORAL at 10:06

## 2019-12-13 RX ADMIN — LISINOPRIL 10 MG: 10 TABLET ORAL at 10:06

## 2019-12-13 RX ADMIN — Medication 10 ML: at 05:54

## 2019-12-13 NOTE — PROGRESS NOTES
Pulmonary, Critical Care, and Sleep Medicine~Progress Note    Name: Anibal Michaels MRN: [id number]   : 1935 Hospital: [facility]   Date: 2019 9:59 AM Admission: 2019     Impression Plan   1. Acute on chronic hypercapnic resp failure secondary to O2 dependent COPD and HF  2. A fib with RVR  3. Metabolic encephalopathy; secondary to CO2. Seems to be improving with NIV  4. Dementia   5. DNR  1. He is not compliant with NIV here in hospital. Often he takes it off and other times refuses it when offered at night. No trilogy set up at this time   2. Diuretics as you are  3. Duonebs/brovana/pulmicort   4. steroids. 5. Warfarin   6. O2 titration around 90%   7. Hypercapnia will be an ongoing issue. 8. palliative care is involved   9. We will be available again to see if needed      Daily Progression:    Seems more alert today     I have reviewed the labs and previous days notes. Pertinent items are noted in HPI. OBJECTIVE:     Vital Signs:       Visit Vitals  /67 (BP 1 Location: Right arm)   Pulse 78   Temp 98.4 °F (36.9 °C)   Resp 17   Ht 5' 10\" (1.778 m)   Wt 70.2 kg (154 lb 11.2 oz)   SpO2 98%   BMI 22.20 kg/m²      Temp (24hrs), Av.6 °F (37 °C), Min:98.4 °F (36.9 °C), Max:99.1 °F (37.3 °C)     Intake/Output:     Last shift: No intake/output data recorded.     Last 3 shifts:  1901 -  0700  In: 720 [P.O.:720]  Out: -           Intake/Output Summary (Last 24 hours) at 2019 1139  Last data filed at 2019 1700  Gross per 24 hour   Intake 480 ml   Output    Net 480 ml       Physical Exam:                                        Exam Findings Other   General: No resp distress noted, appears stated age    HEENT:  No ulcers, JVD not elevated, no cervical LAD    Chest: No pectus deformity, normal chest rise b/l    HEART:  RRR, no murmurs/rubs/gallops    Lungs:  diminished BS at bases    ABD: Soft/NT, non rigid mildly distended    EXT: No cyanosis/clubbing/edema, normal peripheral pulses    Skin: No rashes or ulcers, no mottling    Neuro: Resting in bed         Medications:  Current Facility-Administered Medications   Medication Dose Route Frequency    warfarin (COUMADIN) tablet 5 mg  5 mg Oral ONCE    senna-docusate (PERICOLACE) 8.6-50 mg per tablet 1 Tab  1 Tab Oral DAILY    albuterol-ipratropium (DUO-NEB) 2.5 MG-0.5 MG/3 ML  3 mL Nebulization TID RT    furosemide (LASIX) tablet 40 mg  40 mg Oral DAILY    predniSONE (DELTASONE) tablet 40 mg  40 mg Oral DAILY WITH BREAKFAST    doxycycline (VIBRA-TABS) tablet 100 mg  100 mg Oral Q12H    dilTIAZem (CARDIZEM) IR tablet 30 mg  30 mg Oral Q8H    metoprolol tartrate (LOPRESSOR) tablet 50 mg  50 mg Oral Q12H    melatonin tablet 3 mg  3 mg Oral QHS    influenza vaccine 2019-20 (6 mos+)(PF) (FLUARIX/FLULAVAL/FLUZONE QUAD) injection 0.5 mL  0.5 mL IntraMUSCular PRIOR TO DISCHARGE    Warfarin - Pharmacy to Dose   Other Rx Dosing/Monitoring    lactobac ac& pc-s.therm-b.anim (HELADIO Q/RISAQUAD)  1 Cap Oral DAILY    arformoterol (BROVANA) neb solution 15 mcg  15 mcg Nebulization BID RT    And    budesonide (PULMICORT) 500 mcg/2 ml nebulizer suspension  500 mcg Nebulization BID RT    traMADol (ULTRAM) tablet 50 mg  50 mg Oral Q6H PRN    hydrALAZINE (APRESOLINE) 20 mg/mL injection 20 mg  20 mg IntraVENous Q6H PRN    lisinopril (PRINIVIL, ZESTRIL) tablet 10 mg  10 mg Oral DAILY    sodium chloride (NS) flush 5-40 mL  5-40 mL IntraVENous Q8H    sodium chloride (NS) flush 5-40 mL  5-40 mL IntraVENous PRN    acetaminophen (TYLENOL) tablet 650 mg  650 mg Oral Q4H PRN    ondansetron (ZOFRAN) injection 4 mg  4 mg IntraVENous Q4H PRN    aspirin delayed-release tablet 81 mg  81 mg Oral DAILY    atorvastatin (LIPITOR) tablet 40 mg  40 mg Oral DAILY    thiamine HCL (B-1) tablet 100 mg  100 mg Oral DAILY       Labs:  ABG Recent Labs     12/10/19  2874 12/10/19  1502   PHI 7.382 7.376   PCO2I >90.0* >90.0*   PO2I 64* 63*        CBC No results for input(s): WBC, HGB, HCT, PLT, MCV, MCH, HGBEXT, HCTEXT, PLTEXT, HGBEXT, HCTEXT, PLTEXT in the last 72 hours.      Metabolic  Panel Recent Labs     12/13/19  0419 12/12/19  0413 12/11/19  0744   INR 1.7* 1.5* 1.4*        Pertinent Labs                Reid Estrella PA-C  12/13/2019

## 2019-12-13 NOTE — DISCHARGE INSTRUCTIONS
Discharge Instructions       PATIENT ID: Kaitlynn Collins  MRN: [id number]   YOB: 1935    DATE OF ADMISSION: 2019  2:37 PM    DATE OF DISCHARGE: 2019    PRIMARY CARE PROVIDER: Arlen Cheung MD     ATTENDING PHYSICIAN: Tressa Collins MD  DISCHARGING PROVIDER: Hilario Lagunas MD    To contact this individual call [phone] and ask the  to page. If unavailable ask to be transferred the Adult Hospitalist Department. DISCHARGE DIAGNOSES   R arm cellulitis with R elbow effusion: improved  - s/p arthrocentesis .   - XR:  Nonspecific joint effusion. No evidence of fracture or osteomyelitis  - Ortho consult noted  - blood cx negative. Fluid cx so far negative  - venous duplex negative for DVT   - appreciate ID input  - stopped cefepime , stopped Vanc 12/10. Started on po Doxy x total 5 days   - needs arm elevated      Acute on chronic hypoxic respiratory failure - improving but CO2 stays high  Hx COPD on 2l home oxygen  Acute on Chronic diastolic CHF, NYHA III on admisison, NYHA class II on DC  - : AB.2/ >90/ 62  -  bnp 5,072  - echo shows normal LVEF  - appreciate pulmonology input   - inhaled bronchodilators brovana/ pulmicort/ duoneb   - cont PO steroids 12/10-12/15  - cont Lasix 40 mg po daily  - Likely has chronic dementia and chronic CO2 retention. - not a candidate for trilogy as he has dementia  - palliative care consult noted. Await placement.     Acute metabolic encephalopathy - improved  Underlying dementia   - CT head: No evidence of acute infarct or intracranial hemorrhage. Periventricular white matter disease is likely secondary to chronic small vessel ischemic changes. Chronic left posterior parietal infarct. - Acute agitation on  pm s/p haldol - no recurrence since then  - supportive care     Chronic atrial fibrillation:   - rate controlled on diltiazem, metoprolol.  Pharmacy to dose coumadin, INR as per facility protocol     Supra therapeutic INR on poa- resolved.       UTI ruled out      Hypokalemia - replaced      HTN: Bp stable. On lisinopril, diltiazem, metoprolol, lasix    History of alcohol abuse: remote, resolved per son  History of CVA: on aspirin, statin      CONSULTATIONS: IP CONSULT TO HOSPITALIST  IP CONSULT TO INFECTIOUS DISEASES  IP CONSULT TO PALLIATIVE CARE - PROVIDER  IP CONSULT TO PULMONOLOGY    PROCEDURES/SURGERIES: * No surgery found *    PENDING TEST RESULTS:   At the time of discharge the following test results are still pending: n/a    FOLLOW UP APPOINTMENTS:   Follow-up Information     Follow up With Specialties Details Why Contact Info    Alva Chavis MD Internal Medicine Schedule an appointment as soon as possible for a visit in 1 week  [address]  [address] [facility]      Leoncio Schwarz MD Pulmonary Disease  as recommended [address]  [address] [facility] [phone]             ADDITIONAL CARE RECOMMENDATIONS:   · It is important that you take the medication exactly as they are prescribed. · Keep your medication in the bottles provided by the pharmacist and keep a list of the medication names, dosages, and times to be taken in your wallet. · Do not take other medications without consulting your doctor. · No drinking alcohol or driving car or operating machinery if you are on narcotic pain medications. Donot take sedating mediations if you are sleepy or confused. · Fall Precautions  · Keep Well Hydrated  · Report to your medical provider if you feel you have  developed allergies to medications  · Follow up with your PCP or Consultant for medication adjustments and refills  · Monitor for signs of fevers,chills,bleeding,chest pain and seek medical attention if you do so.        DIET: DIET ONE TIME MESSAGE  DIET ONE TIME MESSAGE  DIET NUTRITIONAL SUPPLEMENTS  DIET CARDIAC    ACTIVITY: PT/OT Eval and Treat    WOUND CARE: n/a    EQUIPMENT needed: n/a      DISCHARGE MEDICATIONS:   See Medication Reconciliation Form    · It is important that you take the medication exactly as they are prescribed. · Keep your medication in the bottles provided by the pharmacist and keep a list of the medication names, dosages, and times to be taken in your wallet. · Do not take other medications without consulting your doctor. NOTIFY YOUR PHYSICIAN FOR ANY OF THE FOLLOWING:   Fever over 101 degrees for 24 hours. Chest pain, shortness of breath, fever, chills, nausea, vomiting, diarrhea, change in mentation, falling, weakness, bleeding. Severe pain or pain not relieved by medications. Or, any other signs or symptoms that you may have questions about. DISPOSITION:    Home With:   OT  PT  HH  RN      x SNF/Inpatient Rehab/LTAC    Independent/assisted living    Hospice    Other:         Information obtained by :   I understand that if any problems occur once I am at home I am to contact my physician. I understand and acknowledge receipt of the instructions indicated above.                                                                                                                                              [de-identified] or R.N.'s Signature                                                                  Date/Time                                                                                                                                              Patient or Representative Signature                                                          Date/Time          Signed:   Prakash Gunter MD  12/13/2019  12:25 PM

## 2019-12-13 NOTE — PROGRESS NOTES
Problem: Pressure Injury - Risk of  Goal: Prevention of pressure injury  Outcome: Progressing Towards Goal    Pt turned approximately every 2 hours. Problem: Patient Education: Go to Patient Education Activity  Goal: Patient/Family Education  Outcome: Progressing Towards Goal  Head of bed elevated before administering PO meds, Meds crushed in applesauce     Problem: Chronic Obstructive Pulmonary Disease (COPD)  Goal: *Absence of hypoxia  Outcome: Progressing Towards Goal  Pt's O2 saturation remains greater than 95%. 0541-Left message for pt's son Gina Vanegas that pt is getting moved to [phone]. Hourly rounding done, pt in A fib, on 2L NC, O2 saturation greater than 95%, pt on bedrest, incontinence and mario care done, no complaints of pain throughout shift. TRANSFER - OUT REPORT:    [facility] on [facility]  being transferred to 6N(unit) for routine progression of care       Report consisted of patients Situation, Background, Assessment and   Recommendations(SBAR). Information from the following report(s) SBAR, Kardex, ED Summary, Intake/Output, MAR, Accordion, Recent Results, Med Rec Status and Cardiac Rhythm A fib was reviewed with the receiving nurse. Lines:   Peripheral IV 12/08/19 Left;Posterior Wrist (Active)   Site Assessment Clean, dry, & intact 12/13/2019  6:10 AM   Phlebitis Assessment 0 12/13/2019  6:10 AM   Infiltration Assessment 0 12/13/2019  6:10 AM   Dressing Status Clean, dry, & intact 12/13/2019  6:10 AM   Dressing Type Transparent;Tape 12/13/2019  6:10 AM   Hub Color/Line Status Blue;Flushed;Capped 12/13/2019  6:10 AM   Action Taken Open ports on tubing capped 12/13/2019  6:10 AM   Alcohol Cap Used Yes 12/13/2019  6:10 AM        Opportunity for questions and clarification was provided.       Patient transported with:   O2 @ 2 liters  Tech

## 2019-12-13 NOTE — DISCHARGE SUMMARY
Inpatient hospitalist discharge summary                Brief Overview    PATIENT ID: Guillermo Love    MRN: [id number]     YOB: 1935    Admitting Provider: Gayla Branch MD    Discharging Provider: Kayy Alfonso MD   To contact this individual call [phone] and ask the  to page. If unavailable ask to be transferred the Adult Hospitalist Department. PCP at discharge: [facility],  [facility] [address]    Admission date: 2019  Date of Discharge: 19    Chief complaint:   Chief Complaint   Patient presents with    Altered mental status     Patient Active Problem List   Diagnosis Code    Cellulitis of right arm L03. 460    Diastolic CHF ([facility] [address] S85.08         Discharge diagnosis, hospital course/plan:  R arm cellulitis with R elbow effusion: improved  - s/p arthrocentesis .   - XR:  Nonspecific joint effusion. No evidence of fracture or osteomyelitis  - Ortho consult noted  - blood cx negative. Fluid cx so far negative  - venous duplex negative for DVT   - appreciate ID input  - stopped cefepime , stopped Vanc 12/10. Started on po Doxy x total 5 days   - needs arm elevated      Acute on chronic hypoxic respiratory failure - improving but CO2 stays high  Hx COPD on 2l home oxygen  Acute on Chronic diastolic CHF, NYHA III on admisison, NYHA class II on DC  - : AB.2/ >90/ 62  -  bnp 5,072  - echo shows normal LVEF  - appreciate pulmonology input   - inhaled bronchodilators brovana/ pulmicort/ duoneb   - cont PO steroids 12/10-12/15  - cont Lasix 40 mg po daily  - Likely has chronic dementia and chronic CO2 retention. - not a candidate for trilogy as he has dementia  - palliative care consult noted. Await placement.     Acute metabolic encephalopathy - improved  Underlying dementia   - CT head: No evidence of acute infarct or intracranial hemorrhage.  Periventricular white matter disease is likely secondary to chronic small vessel ischemic changes. Chronic left posterior parietal infarct. - Acute agitation on 11/28 pm s/p haldol - no recurrence since then  - supportive care     Chronic atrial fibrillation:   - rate controlled on diltiazem, metoprolol. Pharmacy to dose coumadin, INR as per facility protocol     Supra therapeutic INR on poa- resolved.       UTI ruled out      Hypokalemia - replaced      HTN: Bp stable. On lisinopril, diltiazem, metoprolol, lasix    History of alcohol abuse: remote, resolved per son  History of CVA: on aspirin, statin      On the date of discharge, diagnostic face to face encounter was performed. Patient was hemodynamically stable, offering no new complaints. Denies any shortness of breath at rest, no fevers or chills, no diarrhea or constipation. Patient is agreeable for discharge. Patient understood and verbalized the understanding of the discharge plan. Patient was advised to seek medical help/ care or return to ED, if symptoms recur, worsen or new symptoms develop. Discharge Disposition:    SNF    Discharge activity:  PT/OT Eval and Treat    Code status at discharge:  DNR     Active issues requiring follow up:  CHF  COPD  CO2 retention /COPD    Outpatient follow up:  FU with primary Pulmonologist in 1-2 weeks      Future appointments-  No future appointments.   Follow-up Information     Follow up With Specialties Details Why Contact Info    Ailin Chavis MD Internal Medicine Schedule an appointment as soon as possible for a visit in 1 week  [facility]  [address] [facility]      Cali Medina MD Pulmonary Disease  as recommended [address] [facility] [address] [facility]  [phone]            Test results pending upon discharge:  n/a    Operative procedures performed:      Treatments: IV hydration, antibiotics: vancomycin and cefepime, vibramycin, cardiac meds: furosemide, anticoagulation: Warfarin and steroids: solu-medrol and prednisone    Consults:  IP CONSULT TO HOSPITALIST  IP CONSULT TO INFECTIOUS DISEASES  IP CONSULT TO PALLIATIVE CARE - PROVIDER  IP CONSULT TO PULMONOLOGY    Procedures:    * No surgery found *    Diet:  DIET ONE TIME MESSAGE  DIET ONE TIME MESSAGE  DIET NUTRITIONAL SUPPLEMENTS  DIET CARDIAC    Pertinent test results:  Xr Chest Pa Lat    Result Date: 11/27/2019  INDICATION:  ams COMPARISON: 10/8/2019 FINDINGS: PA and lateral views of the chest demonstrate a stable cardiomediastinal silhouette and no focal airspace disease. There are small bilateral pleural effusions. . The visualized osseous structures are unremarkable. IMPRESSION: Small bilateral pleural effusions. Xr Elbow Rt Min 3 V    Result Date: 11/27/2019  EXAM: XR ELBOW RT MIN 3 V INDICATION: Right elbow pain and swelling. Confusion. No history of injury. COMPARISON: None. FINDINGS: Three views of the right elbow demonstrate antecubital intravenous access. Elbow joint effusion is moderate. Bones are osteopenic. No acute fracture or evidence of osteomyelitis. Mild ulnotrochlear osteoarthritis. IMPRESSION: Nonspecific joint effusion. No evidence of fracture or osteomyelitis. Ct Head Wo Cont    Result Date: 11/27/2019  Indication:  right sided weakness? Comparison: None Findings: 5 mm axial images were obtained from the skull base through the vertex. CT dose reduction was achieved through the use of a standardized protocol tailored for this examination and automatic exposure control for dose modulation. The ventricles and cortical sulci are prominent, compatible with age related volume loss. There is a chronic left parietal infarct. There is no evidence of intracranial hemorrhage, mass, mass effect, or acute infarct. There is periventricular white matter disease. No extra-axial fluid collections are seen. The visualized paranasal sinuses and mastoid air cells are clear. The orbital structures are unremarkable.  No osseous abnormalities are seen. Impression: 1. No evidence of acute infarct or intracranial hemorrhage. 2. Periventricular white matter disease is likely secondary to chronic small vessel ischemic changes. 3. Chronic left posterior parietal infarct. Xr Chest Port    Result Date: 12/7/2019  Clinical indication: Hypoxia. Portable AP semiupright view of the chest obtained and compared to November 27. Worsening of pulmonary edema with bilateral effusion. IMPRESSION: Worsening: Congestive changes. Recent Results (from the past 336 hour(s))   PROTHROMBIN TIME + INR    Collection Time: 11/30/19 12:40 AM   Result Value Ref Range    INR 2.7 (H) 0.9 - 1.1      Prothrombin time 25.7 (H) 9.0 - 52.9 sec   METABOLIC PANEL, BASIC    Collection Time: 11/30/19 12:40 AM   Result Value Ref Range    Sodium 140 136 - 145 mmol/L    Potassium 4.4 3.5 - 5.1 mmol/L    Chloride 104 97 - 108 mmol/L    CO2 35 (H) 21 - 32 mmol/L    Anion gap 1 (L) 5 - 15 mmol/L    Glucose 126 (H) 65 - 100 mg/dL    BUN 20 6 - 20 MG/DL    Creatinine 0.96 0.70 - 1.30 MG/DL    BUN/Creatinine ratio 21 (H) 12 - 20      GFR est AA >60 >60 ml/min/1.73m2    GFR est non-AA >60 >60 ml/min/1.73m2    Calcium 9.7 8.5 - 10.1 MG/DL   CBC WITH AUTOMATED DIFF    Collection Time: 11/30/19 12:40 AM   Result Value Ref Range    WBC 14.1 (H) 4.1 - 11.1 K/uL    RBC 4.86 4.10 - 5.70 M/uL    HGB 15.0 12.1 - 17.0 g/dL    HCT 49.8 36.6 - 50.3 %    .5 (H) 80.0 - 99.0 FL    MCH 30.9 26.0 - 34.0 PG    MCHC 30.1 30.0 - 36.5 g/dL    RDW 13.0 11.5 - 14.5 %    PLATELET 064 514 - 360 K/uL    MPV 10.1 8.9 - 12.9 FL    NRBC 0.0 0  WBC    ABSOLUTE NRBC 0.00 0.00 - 0.01 K/uL    NEUTROPHILS 87 (H) 32 - 75 %    LYMPHOCYTES 6 (L) 12 - 49 %    MONOCYTES 6 5 - 13 %    EOSINOPHILS 0 0 - 7 %    BASOPHILS 0 0 - 1 %    IMMATURE GRANULOCYTES 1 (H) 0.0 - 0.5 %    ABS. NEUTROPHILS 12.4 (H) 1.8 - 8.0 K/UL    ABS. LYMPHOCYTES 0.8 0.8 - 3.5 K/UL    ABS. MONOCYTES 0.8 0.0 - 1.0 K/UL    ABS. EOSINOPHILS 0.0 0.0 - 0.4 K/UL    ABS. BASOPHILS 0.0 0.0 - 0.1 K/UL    ABS. IMM.  GRANS. 0.1 (H) 0.00 - 0.04 K/UL    DF SMEAR SCANNED      RBC COMMENTS MACROCYTOSIS  1+        RBC COMMENTS OVALOCYTES  1+        RBC COMMENTS POLYCHROMASIA  1+       VANCOMYCIN, TROUGH    Collection Time: 11/30/19  9:46 PM   Result Value Ref Range    Vancomycin,trough 13.1 (H) 5.0 - 10.0 ug/mL    Reported dose date: NOT PROVIDED      Reported dose time: NOT PROVIDED      Reported dose: NOT PROVIDED UNITS   PROTHROMBIN TIME + INR    Collection Time: 12/01/19  2:09 AM   Result Value Ref Range    INR 4.2 (H) 0.9 - 1.1      Prothrombin time 39.7 (H) 9.0 - 71.1 sec   METABOLIC PANEL, BASIC    Collection Time: 12/01/19  2:09 AM   Result Value Ref Range    Sodium 142 136 - 145 mmol/L    Potassium 3.7 3.5 - 5.1 mmol/L    Chloride 107 97 - 108 mmol/L    CO2 31 21 - 32 mmol/L    Anion gap 4 (L) 5 - 15 mmol/L    Glucose 126 (H) 65 - 100 mg/dL    BUN 17 6 - 20 MG/DL    Creatinine 0.81 0.70 - 1.30 MG/DL    BUN/Creatinine ratio 21 (H) 12 - 20      GFR est AA >60 >60 ml/min/1.73m2    GFR est non-AA >60 >60 ml/min/1.73m2    Calcium 9.2 8.5 - 10.1 MG/DL   PROTHROMBIN TIME + INR    Collection Time: 12/02/19  2:25 AM   Result Value Ref Range    INR 4.4 (H) 0.9 - 1.1      Prothrombin time 41.3 (H) 9.0 - 76.8 sec   METABOLIC PANEL, BASIC    Collection Time: 12/02/19  2:25 AM   Result Value Ref Range    Sodium 143 136 - 145 mmol/L    Potassium 3.6 3.5 - 5.1 mmol/L    Chloride 107 97 - 108 mmol/L    CO2 33 (H) 21 - 32 mmol/L    Anion gap 3 (L) 5 - 15 mmol/L    Glucose 112 (H) 65 - 100 mg/dL    BUN 14 6 - 20 MG/DL    Creatinine 0.75 0.70 - 1.30 MG/DL    BUN/Creatinine ratio 19 12 - 20      GFR est AA >60 >60 ml/min/1.73m2    GFR est non-AA >60 >60 ml/min/1.73m2    Calcium 9.3 8.5 - 10.1 MG/DL   PROTHROMBIN TIME + INR    Collection Time: 12/03/19  4:01 AM   Result Value Ref Range    INR 4.3 (H) 0.9 - 1.1      Prothrombin time 40.3 (H) 9.0 - 47.1 sec   METABOLIC PANEL, BASIC    Collection Time: 12/03/19  4:01 AM   Result Value Ref Range    Sodium 144 136 - 145 mmol/L    Potassium 3.5 3.5 - 5.1 mmol/L    Chloride 108 97 - 108 mmol/L    CO2 31 21 - 32 mmol/L    Anion gap 5 5 - 15 mmol/L    Glucose 95 65 - 100 mg/dL    BUN 13 6 - 20 MG/DL    Creatinine 0.83 0.70 - 1.30 MG/DL    BUN/Creatinine ratio 16 12 - 20      GFR est AA >60 >60 ml/min/1.73m2    GFR est non-AA >60 >60 ml/min/1.73m2    Calcium 9.2 8.5 - 10.1 MG/DL   CBC WITH AUTOMATED DIFF    Collection Time: 12/03/19  4:01 AM   Result Value Ref Range    WBC 11.0 4.1 - 11.1 K/uL    RBC 4.63 4.10 - 5.70 M/uL    HGB 14.2 12.1 - 17.0 g/dL    HCT 46.8 36.6 - 50.3 %    .1 (H) 80.0 - 99.0 FL    MCH 30.7 26.0 - 34.0 PG    MCHC 30.3 30.0 - 36.5 g/dL    RDW 13.0 11.5 - 14.5 %    PLATELET 639 721 - 210 K/uL    MPV 10.1 8.9 - 12.9 FL    NRBC 0.0 0  WBC    ABSOLUTE NRBC 0.00 0.00 - 0.01 K/uL    NEUTROPHILS 82 (H) 32 - 75 %    LYMPHOCYTES 9 (L) 12 - 49 %    MONOCYTES 7 5 - 13 %    EOSINOPHILS 1 0 - 7 %    BASOPHILS 0 0 - 1 %    IMMATURE GRANULOCYTES 1 (H) 0.0 - 0.5 %    ABS. NEUTROPHILS 9.0 (H) 1.8 - 8.0 K/UL    ABS. LYMPHOCYTES 1.0 0.8 - 3.5 K/UL    ABS. MONOCYTES 0.8 0.0 - 1.0 K/UL    ABS. EOSINOPHILS 0.1 0.0 - 0.4 K/UL    ABS. BASOPHILS 0.0 0.0 - 0.1 K/UL    ABS. IMM. GRANS. 0.1 (H) 0.00 - 0.04 K/UL    DF AUTOMATED     PROTHROMBIN TIME + INR    Collection Time: 12/04/19  3:52 AM   Result Value Ref Range    INR 3.6 (H) 0.9 - 1.1      Prothrombin time 34.5 (H) 9.0 - 11.1 sec   CBC WITH AUTOMATED DIFF    Collection Time: 12/04/19  3:52 AM   Result Value Ref Range    WBC 12.5 (H) 4.1 - 11.1 K/uL    RBC 4.49 4. 10 - 5.70 M/uL    HGB 13.8 12.1 - 17.0 g/dL    HCT 45.3 36.6 - 50.3 %    .9 (H) 80.0 - 99.0 FL    MCH 30.7 26.0 - 34.0 PG    MCHC 30.5 30.0 - 36.5 g/dL    RDW 12.9 11.5 - 14.5 %    PLATELET 113 892 - 672 K/uL    MPV 9.6 8.9 - 12.9 FL    NRBC 0.0 0  WBC    ABSOLUTE NRBC 0.00 0.00 - 0.01 K/uL    NEUTROPHILS 82 (H) 32 - 75 %    LYMPHOCYTES 8 (L) 12 - 49 %    MONOCYTES 7 5 - 13 %    EOSINOPHILS 2 0 - 7 %    BASOPHILS 1 0 - 1 %    IMMATURE GRANULOCYTES 0 0.0 - 0.5 %    ABS. NEUTROPHILS 10.3 (H) 1.8 - 8.0 K/UL    ABS. LYMPHOCYTES 1.0 0.8 - 3.5 K/UL    ABS. MONOCYTES 0.9 0.0 - 1.0 K/UL    ABS. EOSINOPHILS 0.3 0.0 - 0.4 K/UL    ABS. BASOPHILS 0.1 0.0 - 0.1 K/UL    ABS. IMM.  GRANS. 0.0 0.00 - 0.04 K/UL    DF AUTOMATED     METABOLIC PANEL, BASIC    Collection Time: 12/04/19  3:52 AM   Result Value Ref Range    Sodium 144 136 - 145 mmol/L    Potassium 3.3 (L) 3.5 - 5.1 mmol/L    Chloride 103 97 - 108 mmol/L    CO2 36 (H) 21 - 32 mmol/L    Anion gap 5 5 - 15 mmol/L    Glucose 120 (H) 65 - 100 mg/dL    BUN 13 6 - 20 MG/DL    Creatinine 0.90 0.70 - 1.30 MG/DL    BUN/Creatinine ratio 14 12 - 20      GFR est AA >60 >60 ml/min/1.73m2    GFR est non-AA >60 >60 ml/min/1.73m2    Calcium 9.6 8.5 - 10.1 MG/DL   PROTHROMBIN TIME + INR    Collection Time: 12/05/19  4:00 AM   Result Value Ref Range    INR 2.9 (H) 0.9 - 1.1      Prothrombin time 27.4 (H) 9.0 - 82.7 sec   METABOLIC PANEL, BASIC    Collection Time: 12/05/19  4:00 AM   Result Value Ref Range    Sodium 145 136 - 145 mmol/L    Potassium 3.5 3.5 - 5.1 mmol/L    Chloride 104 97 - 108 mmol/L    CO2 39 (H) 21 - 32 mmol/L    Anion gap 2 (L) 5 - 15 mmol/L    Glucose 116 (H) 65 - 100 mg/dL    BUN 13 6 - 20 MG/DL    Creatinine 0.79 0.70 - 1.30 MG/DL    BUN/Creatinine ratio 16 12 - 20      GFR est AA >60 >60 ml/min/1.73m2    GFR est non-AA >60 >60 ml/min/1.73m2    Calcium 9.8 8.5 - 10.1 MG/DL   CBC WITH AUTOMATED DIFF    Collection Time: 12/05/19  4:00 AM   Result Value Ref Range    WBC 11.9 (H) 4.1 - 11.1 K/uL    RBC 4.28 4.10 - 5.70 M/uL    HGB 13.2 12.1 - 17.0 g/dL    HCT 43.9 36.6 - 50.3 %    .6 (H) 80.0 - 99.0 FL    MCH 30.8 26.0 - 34.0 PG    MCHC 30.1 30.0 - 36.5 g/dL    RDW 13.0 11.5 - 14.5 %    PLATELET 889 324 - 075 K/uL    MPV 9.5 8.9 - 12.9 FL    NRBC 0.0 0  WBC ABSOLUTE NRBC 0.00 0.00 - 0.01 K/uL    NEUTROPHILS 84 (H) 32 - 75 %    LYMPHOCYTES 7 (L) 12 - 49 %    MONOCYTES 7 5 - 13 %    EOSINOPHILS 1 0 - 7 %    BASOPHILS 0 0 - 1 %    IMMATURE GRANULOCYTES 1 (H) 0.0 - 0.5 %    ABS. NEUTROPHILS 10.0 (H) 1.8 - 8.0 K/UL    ABS. LYMPHOCYTES 0.9 0.8 - 3.5 K/UL    ABS. MONOCYTES 0.8 0.0 - 1.0 K/UL    ABS. EOSINOPHILS 0.2 0.0 - 0.4 K/UL    ABS. BASOPHILS 0.0 0.0 - 0.1 K/UL    ABS. IMM. GRANS. 0.1 (H) 0.00 - 0.04 K/UL    DF AUTOMATED     METABOLIC PANEL, BASIC    Collection Time: 12/06/19  5:38 AM   Result Value Ref Range    Sodium 142 136 - 145 mmol/L    Potassium 3.6 3.5 - 5.1 mmol/L    Chloride 101 97 - 108 mmol/L    CO2 39 (H) 21 - 32 mmol/L    Anion gap 2 (L) 5 - 15 mmol/L    Glucose 104 (H) 65 - 100 mg/dL    BUN 14 6 - 20 MG/DL    Creatinine 0.84 0.70 - 1.30 MG/DL    BUN/Creatinine ratio 17 12 - 20      GFR est AA >60 >60 ml/min/1.73m2    GFR est non-AA >60 >60 ml/min/1.73m2    Calcium 9.7 8.5 - 10.1 MG/DL   CBC WITH AUTOMATED DIFF    Collection Time: 12/06/19  5:38 AM   Result Value Ref Range    WBC 14.3 (H) 4.1 - 11.1 K/uL    RBC 4.38 4.10 - 5.70 M/uL    HGB 13.4 12.1 - 17.0 g/dL    HCT 44.9 36.6 - 50.3 %    .5 (H) 80.0 - 99.0 FL    MCH 30.6 26.0 - 34.0 PG    MCHC 29.8 (L) 30.0 - 36.5 g/dL    RDW 12.9 11.5 - 14.5 %    PLATELET 158 379 - 689 K/uL    MPV 9.4 8.9 - 12.9 FL    NRBC 0.0 0  WBC    ABSOLUTE NRBC 0.00 0.00 - 0.01 K/uL    NEUTROPHILS 85 (H) 32 - 75 %    LYMPHOCYTES 6 (L) 12 - 49 %    MONOCYTES 6 5 - 13 %    EOSINOPHILS 1 0 - 7 %    BASOPHILS 1 0 - 1 %    IMMATURE GRANULOCYTES 1 (H) 0.0 - 0.5 %    ABS. NEUTROPHILS 12.3 (H) 1.8 - 8.0 K/UL    ABS. LYMPHOCYTES 0.8 0.8 - 3.5 K/UL    ABS. MONOCYTES 0.9 0.0 - 1.0 K/UL    ABS. EOSINOPHILS 0.1 0.0 - 0.4 K/UL    ABS. BASOPHILS 0.1 0.0 - 0.1 K/UL    ABS. IMM.  GRANS. 0.1 (H) 0.00 - 0.04 K/UL    DF AUTOMATED     PROTHROMBIN TIME + INR    Collection Time: 12/06/19  1:10 PM   Result Value Ref Range    INR 2.3 (H) 0.9 - 1.1      Prothrombin time 22.6 (H) 9.0 - 11.1 sec   PROTHROMBIN TIME + INR    Collection Time: 12/07/19  4:32 AM   Result Value Ref Range    INR 2.0 (H) 0.9 - 1.1      Prothrombin time 19.3 (H) 9.0 - 32.6 sec   METABOLIC PANEL, BASIC    Collection Time: 12/07/19  4:32 AM   Result Value Ref Range    Sodium 143 136 - 145 mmol/L    Potassium 3.8 3.5 - 5.1 mmol/L    Chloride 101 97 - 108 mmol/L    CO2 41 (HH) 21 - 32 mmol/L    Anion gap 1 (L) 5 - 15 mmol/L    Glucose 126 (H) 65 - 100 mg/dL    BUN 16 6 - 20 MG/DL    Creatinine 0.76 0.70 - 1.30 MG/DL    BUN/Creatinine ratio 21 (H) 12 - 20      GFR est AA >60 >60 ml/min/1.73m2    GFR est non-AA >60 >60 ml/min/1.73m2    Calcium 9.9 8.5 - 10.1 MG/DL   CBC WITH AUTOMATED DIFF    Collection Time: 12/07/19  4:32 AM   Result Value Ref Range    WBC 14.2 (H) 4.1 - 11.1 K/uL    RBC 4.56 4.10 - 5.70 M/uL    HGB 13.9 12.1 - 17.0 g/dL    HCT 47.6 36.6 - 50.3 %    .4 (H) 80.0 - 99.0 FL    MCH 30.5 26.0 - 34.0 PG    MCHC 29.2 (L) 30.0 - 36.5 g/dL    RDW 13.2 11.5 - 14.5 %    PLATELET 643 489 - 631 K/uL    MPV 9.7 8.9 - 12.9 FL    NRBC 0.0 0  WBC    ABSOLUTE NRBC 0.00 0.00 - 0.01 K/uL    NEUTROPHILS 84 (H) 32 - 75 %    BAND NEUTROPHILS 3 0 - 6 %    LYMPHOCYTES 5 (L) 12 - 49 %    MONOCYTES 6 5 - 13 %    EOSINOPHILS 1 0 - 7 %    BASOPHILS 1 0 - 1 %    IMMATURE GRANULOCYTES 0 %    ABS. NEUTROPHILS 12.4 (H) 1.8 - 8.0 K/UL    ABS. LYMPHOCYTES 0.7 (L) 0.8 - 3.5 K/UL    ABS. MONOCYTES 0.9 0.0 - 1.0 K/UL    ABS. EOSINOPHILS 0.1 0.0 - 0.4 K/UL    ABS. BASOPHILS 0.1 0.0 - 0.1 K/UL    ABS. IMM.  GRANS. 0.0 K/UL    DF MANUAL      RBC COMMENTS MACROCYTOSIS  1+        RBC COMMENTS OVALOCYTES  1+        RBC COMMENTS MICROCYTOSIS  1+        RBC COMMENTS SCHISTOCYTES  1+        RBC COMMENTS STOMATOCYTES  1+       GLUCOSE, POC    Collection Time: 12/07/19  8:28 AM   Result Value Ref Range    Glucose (POC) 122 (H) 65 - 100 mg/dL    Performed by Angel Waters    POC G3 - PUL    Collection Time: 12/07/19  8:41 AM   Result Value Ref Range    pH (POC) 7.235 (LL) 7.35 - 7.45      pCO2 (POC) >90.0 (H) 35.0 - 45.0 MMHG    pO2 (POC) 62 (L) 80 - 100 MMHG    Site LEFT RADIAL      Device: NASAL CANNULA      Flow rate (POC) 4 L/M    Allens test (POC) N/A      Specimen type (POC) ARTERIAL     NT-PRO BNP    Collection Time: 12/07/19 10:21 AM   Result Value Ref Range    NT pro-BNP 5,072 (H) <450 PG/ML   TROPONIN I    Collection Time: 12/07/19 10:21 AM   Result Value Ref Range    Troponin-I, Qt. <0.05 <0.05 ng/mL   POC G3 - PUL    Collection Time: 12/07/19  1:46 PM   Result Value Ref Range    FIO2 (POC) 65 %    pH (POC) 7.383 7.35 - 7.45      pCO2 (POC) 74.0 (H) 35.0 - 45.0 MMHG    pO2 (POC) 80 80 - 100 MMHG    HCO3 (POC) 44.1 (H) 22 - 26 MMOL/L    sO2 (POC) 95 92 - 97 %    Base excess (POC) 19 mmol/L    Site LEFT RADIAL      Device: BIPAP      PEEP/CPAP (POC) 8 cmH2O    PIP (POC) 18      Allens test (POC) YES      Specimen type (POC) ARTERIAL     VANCOMYCIN, TROUGH    Collection Time: 12/07/19  5:30 PM   Result Value Ref Range    Vancomycin,trough 17.4 (H) 5.0 - 10.0 ug/mL    Reported dose date: NOT PROVIDED      Reported dose time: NOT PROVIDED      Reported dose: NOT PROVIDED UNITS   EKG, 12 LEAD, INITIAL    Collection Time: 12/07/19  7:43 PM   Result Value Ref Range    Ventricular Rate 119 BPM    Atrial Rate 85 BPM    QRS Duration 82 ms    Q-T Interval 392 ms    QTC Calculation (Bezet) 551 ms    Calculated R Axis -36 degrees    Calculated T Axis -76 degrees    Diagnosis       Atrial fibrillation with rapid ventricular response  Left axis deviation  Low voltage QRS  Poor R-wave Progression (consider lead placement or loss of anterior forces)  Cannot rule out Anterior infarct , age undetermined  Diffuse nondiagnostic st&t changes  Abnormal ECG  When compared with ECG of 27-NOV-2019 14:59,  Atrial fibrillation was present but possible anterior MI age undetermined is   also possible  Confirmed by Brittney Hair MD, Emily Guzman ([id number]) on 12/7/2019 6:45:50 PM     CBC WITH AUTOMATED DIFF    Collection Time: 12/08/19  2:54 AM   Result Value Ref Range    WBC 11.9 (H) 4.1 - 11.1 K/uL    RBC 4.18 4.10 - 5.70 M/uL    HGB 12.6 12.1 - 17.0 g/dL    HCT 42.5 36.6 - 50.3 %    .7 (H) 80.0 - 99.0 FL    MCH 30.1 26.0 - 34.0 PG    MCHC 29.6 (L) 30.0 - 36.5 g/dL    RDW 12.9 11.5 - 14.5 %    PLATELET 550 669 - 751 K/uL    MPV 9.8 8.9 - 12.9 FL    NRBC 0.0 0  WBC    ABSOLUTE NRBC 0.00 0.00 - 0.01 K/uL    NEUTROPHILS 95 (H) 32 - 75 %    LYMPHOCYTES 3 (L) 12 - 49 %    MONOCYTES 1 (L) 5 - 13 %    EOSINOPHILS 0 0 - 7 %    BASOPHILS 0 0 - 1 %    IMMATURE GRANULOCYTES 1 (H) 0.0 - 0.5 %    ABS. NEUTROPHILS 11.3 (H) 1.8 - 8.0 K/UL    ABS. LYMPHOCYTES 0.4 (L) 0.8 - 3.5 K/UL    ABS. MONOCYTES 0.1 0.0 - 1.0 K/UL    ABS. EOSINOPHILS 0.0 0.0 - 0.4 K/UL    ABS. BASOPHILS 0.0 0.0 - 0.1 K/UL    ABS. IMM.  GRANS. 0.1 (H) 0.00 - 0.04 K/UL    DF SMEAR SCANNED      RBC COMMENTS MACROCYTOSIS  1+        RBC COMMENTS OVALOCYTES  PRESENT       METABOLIC PANEL, BASIC    Collection Time: 12/08/19  2:54 AM   Result Value Ref Range    Sodium 143 136 - 145 mmol/L    Potassium 3.6 3.5 - 5.1 mmol/L    Chloride 97 97 - 108 mmol/L    CO2 39 (H) 21 - 32 mmol/L    Anion gap 7 5 - 15 mmol/L    Glucose 134 (H) 65 - 100 mg/dL    BUN 21 (H) 6 - 20 MG/DL    Creatinine 0.99 0.70 - 1.30 MG/DL    BUN/Creatinine ratio 21 (H) 12 - 20      GFR est AA >60 >60 ml/min/1.73m2    GFR est non-AA >60 >60 ml/min/1.73m2    Calcium 9.6 8.5 - 10.1 MG/DL   PROTHROMBIN TIME + INR    Collection Time: 12/08/19  3:40 AM   Result Value Ref Range    INR 2.3 (H) 0.9 - 1.1      Prothrombin time 22.5 (H) 9.0 - 11.1 sec   PROTHROMBIN TIME + INR    Collection Time: 12/09/19  3:11 AM   Result Value Ref Range    INR 2.0 (H) 0.9 - 1.1      Prothrombin time 19.5 (H) 9.0 - 11.1 sec   CBC WITH AUTOMATED DIFF    Collection Time: 12/09/19  3:11 AM   Result Value Ref Range    WBC 18.6 (H) 4.1 - 11.1 K/uL    RBC 4.08 (L) 4.10 - 5.70 M/uL    HGB 12.6 12.1 - 17.0 g/dL    HCT 41.3 36.6 - 50.3 %    .2 (H) 80.0 - 99.0 FL    MCH 30.9 26.0 - 34.0 PG    MCHC 30.5 30.0 - 36.5 g/dL    RDW 12.9 11.5 - 14.5 %    PLATELET 793 006 - 678 K/uL    MPV 9.5 8.9 - 12.9 FL    NRBC 0.0 0  WBC    ABSOLUTE NRBC 0.00 0.00 - 0.01 K/uL    NEUTROPHILS 94 (H) 32 - 75 %    LYMPHOCYTES 3 (L) 12 - 49 %    MONOCYTES 2 (L) 5 - 13 %    EOSINOPHILS 0 0 - 7 %    BASOPHILS 0 0 - 1 %    IMMATURE GRANULOCYTES 1 (H) 0.0 - 0.5 %    ABS. NEUTROPHILS 17.4 (H) 1.8 - 8.0 K/UL    ABS. LYMPHOCYTES 0.6 (L) 0.8 - 3.5 K/UL    ABS. MONOCYTES 0.4 0.0 - 1.0 K/UL    ABS. EOSINOPHILS 0.0 0.0 - 0.4 K/UL    ABS. BASOPHILS 0.0 0.0 - 0.1 K/UL    ABS. IMM.  GRANS. 0.2 (H) 0.00 - 0.04 K/UL    DF SMEAR SCANNED      RBC COMMENTS HYPOCHROMIA  1+        RBC COMMENTS MACROCYTOSIS  1+        RBC COMMENTS MICROCYTOSIS  1+        RBC COMMENTS ANISOCYTOSIS  2+       METABOLIC PANEL, BASIC    Collection Time: 12/09/19  3:11 AM   Result Value Ref Range    Sodium 140 136 - 145 mmol/L    Potassium 3.2 (L) 3.5 - 5.1 mmol/L    Chloride 93 (L) 97 - 108 mmol/L    CO2 >45 (HH) 21 - 32 mmol/L    Anion gap Cannot be calculated 5 - 15 mmol/L    Glucose 176 (H) 65 - 100 mg/dL    BUN 28 (H) 6 - 20 MG/DL    Creatinine 1.09 0.70 - 1.30 MG/DL    BUN/Creatinine ratio 26 (H) 12 - 20      GFR est AA >60 >60 ml/min/1.73m2    GFR est non-AA >60 >60 ml/min/1.73m2    Calcium 9.2 8.5 - 20.6 MG/DL   METABOLIC PANEL, BASIC    Collection Time: 12/09/19 10:03 AM   Result Value Ref Range    Sodium 139 136 - 145 mmol/L    Potassium 3.4 (L) 3.5 - 5.1 mmol/L    Chloride 91 (L) 97 - 108 mmol/L    CO2 >45 (HH) 21 - 32 mmol/L    Anion gap Cannot be calculated 5 - 15 mmol/L    Glucose 187 (H) 65 - 100 mg/dL    BUN 29 (H) 6 - 20 MG/DL    Creatinine 1.14 0.70 - 1.30 MG/DL    BUN/Creatinine ratio 25 (H) 12 - 20      GFR est AA >60 >60 ml/min/1.73m2    GFR est non-AA >60 >60 ml/min/1.73m2    Calcium 10.0 8.5 - 10.1 MG/DL   ECHO ADULT COMPLETE    Collection Time: 12/09/19 10:26 AM   Result Value Ref Range    LA Volume 149.06 18 - 58 mL    LV E' Lateral Velocity 12.31 cm/s    LV E' Septal Velocity 9.88 cm/s    Tapse 1.71 1.5 - 2.0 cm    Ao Root D 3.60 cm    Aortic Valve Systolic Peak Velocity 584.58 cm/s    Aortic Valve Area by Continuity of Peak Velocity 3.4 cm2    AoV PG 7.8 mmHg    LVIDd 4.25 4.2 - 5.9 cm    LVPWd 1.35 (A) 0.6 - 1.0 cm    LVIDs 2.99 cm    IVSd 1.19 (A) 0.6 - 1.0 cm    LVOT d 2.49 cm    LVOT Peak Velocity 97.71 cm/s    LVOT Peak Gradient 3.8 mmHg    MVA (PHT) 4.2 cm2    MV A Adin 23.45 cm/s    MV E Adin 104.33 cm/s    MV E/A 4.45     Left Atrium to Aortic Root Ratio 1.18     RVIDd 5.22 cm    LA Vol 4C 105.30 (A) 18 - 58 mL    LA Vol 2C 193.64 (A) 18 - 58 mL    LA Area 4C 31.6 cm2    LV Mass .1 (A) 88 - 224 g    LV Mass AL Index 117.1 49 - 115 g/m2    E/E' lateral 8.48     E/E' septal 10.56     RVSP 41.8 mmHg    E/E' ratio (averaged) 9.52     Est. RA Pressure 5.0 mmHg    Mitral Valve E Wave Deceleration Time 180.0 ms    Mitral Valve Pressure Half-time 52.2 ms    Left Atrium Major Axis 4.25 cm    Triscuspid Valve Regurgitation Peak Gradient 36.8 mmHg    Pulmonic Valve Max Velocity 83.91 cm/s    TR Max Velocity 303.36 cm/s    LA Vol Index 75.17 16 - 28 ml/m2    PASP 41.0 mmHg    LA Vol Index 97.66 16 - 28 ml/m2    LA Vol Index 53.10 16 - 28 ml/m2    Left Ventricular Fractional Shortening by 2D 13.18626983 %    Mitral Valve Deceleration [place] 7.2904779514897     AV Velocity Ratio 0.70     PV peak gradient 2.8 mmHg   POC G3 - PUL    Collection Time: 12/09/19 12:15 PM   Result Value Ref Range    pH (POC) 7.372 7.35 - 7.45      pCO2 (POC) >90.0 (H) 35.0 - 45.0 MMHG    pO2 (POC) 103 (H) 80 - 100 MMHG    Site RIGHT RADIAL      Device: NASAL CANNULA      Flow rate (POC) 2 L/M    Allens test (POC) YES      Specimen type (POC) ARTERIAL      Total resp.  rate 29     POC G3 - PUL    Collection Time: 12/09/19  5:06 PM   Result Value Ref Range    FIO2 (POC) 40 %    pH (POC) 7.378 7.35 - 7.45      pCO2 (POC) >90.0 (H) 35.0 - 45.0 MMHG    pO2 (POC) 93 80 - 100 MMHG    Site RIGHT RADIAL      Device: BIPAP      PEEP/CPAP (POC) 6 cmH2O    PIP (POC) 14      Allens test (POC) YES      Specimen type (POC) ARTERIAL      Total resp. rate 22     PROTHROMBIN TIME + INR    Collection Time: 12/10/19  2:57 AM   Result Value Ref Range    INR 1.6 (H) 0.9 - 1.1      Prothrombin time 15.6 (H) 9.0 - 11.1 sec   CBC WITH AUTOMATED DIFF    Collection Time: 12/10/19  2:57 AM   Result Value Ref Range    WBC 16.9 (H) 4.1 - 11.1 K/uL    RBC 3.88 (L) 4.10 - 5.70 M/uL    HGB 11.7 (L) 12.1 - 17.0 g/dL    HCT 39.3 36.6 - 50.3 %    .3 (H) 80.0 - 99.0 FL    MCH 30.2 26.0 - 34.0 PG    MCHC 29.8 (L) 30.0 - 36.5 g/dL    RDW 13.2 11.5 - 14.5 %    PLATELET 656 044 - 996 K/uL    MPV 10.0 8.9 - 12.9 FL    NRBC 0.0 0  WBC    ABSOLUTE NRBC 0.00 0.00 - 0.01 K/uL    NEUTROPHILS 95 (H) 32 - 75 %    LYMPHOCYTES 2 (L) 12 - 49 %    MONOCYTES 2 (L) 5 - 13 %    EOSINOPHILS 0 0 - 7 %    BASOPHILS 0 0 - 1 %    IMMATURE GRANULOCYTES 1 (H) 0.0 - 0.5 %    ABS. NEUTROPHILS 16.1 (H) 1.8 - 8.0 K/UL    ABS. LYMPHOCYTES 0.3 (L) 0.8 - 3.5 K/UL    ABS. MONOCYTES 0.3 0.0 - 1.0 K/UL    ABS. EOSINOPHILS 0.0 0.0 - 0.4 K/UL    ABS. BASOPHILS 0.0 0.0 - 0.1 K/UL    ABS. IMM.  GRANS. 0.2 (H) 0.00 - 0.04 K/UL    DF SMEAR SCANNED      RBC COMMENTS MACROCYTOSIS  1+       METABOLIC PANEL, BASIC    Collection Time: 12/10/19  2:57 AM   Result Value Ref Range    Sodium 143 136 - 145 mmol/L    Potassium 3.6 3.5 - 5.1 mmol/L    Chloride 94 (L) 97 - 108 mmol/L    CO2 >45 (HH) 21 - 32 mmol/L    Anion gap Cannot be calculated 5 - 15 mmol/L    Glucose 153 (H) 65 - 100 mg/dL    BUN 36 (H) 6 - 20 MG/DL    Creatinine 0.99 0.70 - 1.30 MG/DL    BUN/Creatinine ratio 36 (H) 12 - 20      GFR est AA >60 >60 ml/min/1.73m2    GFR est non-AA >60 >60 ml/min/1.73m2    Calcium 9.6 8.5 - 10.1 MG/DL   POC G3 - PUL    Collection Time: 12/10/19  4:50 AM   Result Value Ref Range    pH (POC) 7.450 7.35 - 7.45      pCO2 (POC) 84.1 (H) 35.0 - 45.0 MMHG    pO2 (POC) 90 80 - 100 MMHG    HCO3 (POC) 58.5 (H) 22 - 26 MMOL/L    sO2 (POC) 97 92 - 97 %    Base excess (POC) >30 mmol/L    Site LEFT RADIAL      Device: BIPAP      PEEP/CPAP (POC) 6 cmH2O    PIP (POC) 16      Allens test (POC) YES      Specimen type (POC) ARTERIAL     POC G3 - PUL    Collection Time: 12/10/19  3:02 PM   Result Value Ref Range    pH (POC) 7.376 7.35 - 7.45      pCO2 (POC) >90.0 (H) 35.0 - 45.0 MMHG    pO2 (POC) 63 (L) 80 - 100 MMHG    Site LEFT BRACHIAL      Device: NASAL CANNULA      Flow rate (POC) 2.5 L/M    Allens test (POC) N/A      Specimen type (POC) ARTERIAL      Total resp.  rate 26     POC G3 - PUL    Collection Time: 12/10/19  3:10 PM   Result Value Ref Range    pH (POC) 7.382 7.35 - 7.45      pCO2 (POC) >90.0 (H) 35.0 - 45.0 MMHG    pO2 (POC) 64 (L) 80 - 100 MMHG    Site LEFT RADIAL      Device: NASAL CANNULA      Flow rate (POC) 2.5 L/M    Allens test (POC) N/A      Specimen type (POC) ARTERIAL     PROTHROMBIN TIME + INR    Collection Time: 12/11/19  7:44 AM   Result Value Ref Range    INR 1.4 (H) 0.9 - 1.1      Prothrombin time 14.2 (H) 9.0 - 11.1 sec   PROTHROMBIN TIME + INR    Collection Time: 12/12/19  4:13 AM   Result Value Ref Range    INR 1.5 (H) 0.9 - 1.1      Prothrombin time 14.6 (H) 9.0 - 11.1 sec   PROTHROMBIN TIME + INR    Collection Time: 12/13/19  4:19 AM   Result Value Ref Range    INR 1.7 (H) 0.9 - 1.1      Prothrombin time 17.0 (H) 9.0 - 11.1 sec           Physical Exam on Discharge:    Discharge condition: fair    Vital signs:   Patient Vitals for the past 12 hrs:   Temp Pulse Resp BP SpO2   12/13/19 0849 98.4 °F (36.9 °C) 78 17 133/67    12/13/19 0401 98.7 °F (37.1 °C) 90 25 123/75 98 %   12/13/19 0050 98.5 °F (36.9 °C) 83 29 109/60 97 %       Lungs: clear to auscultation bilaterally  Abdomen: normal findings: soft, non-tender    Current Discharge Medication List      START taking these medications    Details   doxycycline (VIBRA-TABS) 100 mg tablet Take 1 Tab by mouth every twelve (12) hours for 2 days. Qty: 4 Tab, Refills: 0      L. acidoph & paracasei- S therm- Bifido (HELADIO-Q/RISAQUAD) 8 billion cell cap cap Take 1 Cap by mouth daily for 3 days. Qty: 3 Cap, Refills: 0      predniSONE (DELTASONE) 20 mg tablet Take 40 mg by mouth daily (with breakfast) for 2 days. Qty: 4 Tab, Refills: 0      budesonide (PULMICORT) 0.5 mg/2 mL nbsp 2 mL by Nebulization route two (2) times a day for 30 days. Qty: 60 Each, Refills: 0         CONTINUE these medications which have CHANGED    Details   lisinopril (PRINIVIL, ZESTRIL) 5 mg tablet Take 2 Tabs by mouth daily. Qty: 10 Tab, Refills: 0      furosemide (LASIX) 20 mg tablet Take 2 Tabs by mouth daily for 30 days. Qty: 60 Tab, Refills: 0         CONTINUE these medications which have NOT CHANGED    Details   aspirin delayed-release 81 mg tablet Take  by mouth daily. atorvastatin (LIPITOR) 40 mg tablet Take 40 mg by mouth daily. At bedtime      dilTIAZem (CARDIZEM) 30 mg tablet Take 30 mg by mouth. Every 8 hours for HTN      loratadine 10 mg cap Take 10 mg by mouth daily. metoprolol tartrate (LOPRESSOR) 50 mg tablet Take 50 mg by mouth two (2) times a day. guaiFENesin (MUCINEX) 1,200 mg Ta12 ER tablet Take 1,200 mg by mouth two (2) times a day. PRN cough and congestion      thiamine HCL (B-1) 100 mg tablet Take 100 mg by mouth daily. cyanocobalamin (VITAMIN B-12) 1,000 mcg tablet Take 1,000 mcg by mouth daily. cholecalciferol, vitamin D3, (VITAMIN D3) 2,000 unit tab Take 5,000 Units by mouth daily. warfarin (COUMADIN) 4 mg tablet Take 4 mg by mouth daily. albuterol (PROVENTIL VENTOLIN) 2.5 mg /3 mL (0.083 %) nebu 2.5 mg by Nebulization route.  BID for shortness of breath      albuterol (VENTOLIN HFA) 90 mcg/actuation inhaler Take  by inhalation every four (4) hours as needed for Wheezing. acetaminophen (TYLENOL) 325 mg tablet Take 650 mg by mouth every four (4) hours as needed for Pain. glycopyrrolate-formoterol (BEVESPI AEROSPHERE) 9-4.8 mcg HFAA Take 2 Puffs by inhalation two (2) times a day. Use with spacer      therapeutic multivitamin (THERAGRAN) tablet Take 1 Tab by mouth daily.                Total time spent on discharge planning, counseling and co-ordination of care:   35 minutes    Jun Ortiz MD  12/13/19  12:27 PM

## 2019-12-13 NOTE — PROGRESS NOTES
Problem: Patient Education: Go to Patient Education Activity  Goal: Patient/Family Education  Outcome: Progressing Towards Goal  Note:   Pt on soft diet, tolerating well     Problem: Chronic Obstructive Pulmonary Disease (COPD)  Goal: *Oxygen saturation during activity within specified parameters  Outcome: Progressing Towards Goal  Note:   Pt on 2L NC, with oxygen saturations above 90%. Bedside shift change report given to Sarahi (oncoming nurse) by Flash Sauceda (offgoing nurse). Report included the following information SBAR, Kardex, Procedure Summary, Intake/Output, MAR, Recent Results, and Cardiac Rhythm afib .

## 2019-12-13 NOTE — PROGRESS NOTES
Problem: Self Care Deficits Care Plan (Adult)  Goal: *Acute Goals and Plan of Care (Insert Text)  Description    FUNCTIONAL STATUS PRIOR TO ADMISSION: lives in SNF, hx of dementia and confusion per chart, patient reports mobility without equipment     HOME SUPPORT: per chart has son in area    Occupational Therapy Goals  Initiated 11/29/2019, reviewed 12/10/19  1. Patient will perform self-feeding with supervision/set-up using right dominant hand within 7 day(s). 2.  Patient will perform grooming with supervision/set-up within 7 day(s). 3.  Patient will perform upper body dressing with minimal assistance/contact guard assist within 7 day(s). 4.  Patient will perform toilet transfers with minimal assistance/contact guard assist and best technique within 7 day(s). 5.  Patient will tolerate AAROM right UE all joints with minimal complaint of pain within 7 day(s). 12/13/2019 1307 by Vivi Patterson OT  Outcome: Not Met  OCCUPATIONAL THERAPY TREATMENT  Patient: Rin Ballard (14 y.o. male)  Date: 12/13/2019  Diagnosis: Cellulitis of right arm [L03.113]   <principal problem not specified>       Precautions: Fall, Skin  Chart, occupational therapy assessment, plan of care, and goals were reviewed. ASSESSMENT  Patient continues with skilled OT services and is not progressing towards goals, increased confusion and command following this session limiting progress. Dependent toileting completed in supine, patient able to assist with rolling utilizing bed rails. Attempted to complete functional EOB tasks, however patient requiring Max A and max cues for sequencing to complete bed mobility, ultimately unable to sit up d/t strong L posterior-lateral lean, unable to correct with BUE bed rail assist and anterior facilitation. Returned to supine, all needs met, SOB relieving with rest break. Continue to recommend SNF as patient is far from his baseline, requiring assist for all ADLs and mobility at this time.  Of note, RN reporting significant decline in cognition (A&O x0-1 throughout session). Current Level of Function Impacting Discharge (ADLs): Mod-Total A for ADLs, Max A for bed mobility    Other factors to consider for discharge: impaired cognition (worsening?), physical assist for all ADLs and mobility, PMH         PLAN :  Patient continues to benefit from skilled intervention to address the above impairments. Continue treatment per established plan of care. to address goals. Recommend with staff: Recommend with nursing patient to complete as able in order to maintain strength, endurance and independence: ADLs with assistance, modified bed in chair position 3x/day and rolling in supine for toileting with 2 assist. Thank you for your assistance. Recommend next OT session: BUE strengthening, unsupported sitting ADLs with 2 assist    Recommendation for discharge: (in order for the patient to meet his/her long term goals)  Therapy up to 5 days/week in SNF setting    This discharge recommendation:  Has been made in collaboration with the attending provider and/or case management    IF patient discharges home will need the following DME: to be determined (TBD)       SUBJECTIVE:   Patient stated  ? No, Mr. Sergei Horowitz, what where you asking?     OBJECTIVE DATA SUMMARY:   Cognitive/Behavioral Status:  Neurologic State: Alert  Orientation Level: Oriented to person;Disoriented to place; Disoriented to situation;Disoriented to time(increased time and intermittent A&O x0)  Cognition: Decreased attention/concentration;Decreased command following;Poor safety awareness  Perception: Cues to maintain midline in sitting(strong posterior-leateral lean to the L)  Perseveration: No perseveration noted  Safety/Judgement: Decreased awareness of environment;Decreased awareness of need for safety;Decreased awareness of need for assistance; Lack of insight into deficits    Functional Mobility and Transfers for ADLs:  Bed Mobility:  Supine to Sit: Maximum assistance; Additional time  Sit to Supine: Maximum assistance; Additional time  Scooting: Moderate assistance;Maximum assistance(Mod A in supine with bed rails, Max A sitting)    Transfers:     Functional Transfers  Toilet Transfer : Moderate assistance;Maximum assistance(rolling in supine, increased A to roll to the R)  Cues: Physical assistance; Tactile cues provided;Verbal cues provided;Visual cues provided  Adaptive Equipment: (bed rails for BUEs)       Balance:  Sitting: Impaired; Without support  Sitting - Static: Poor (constant support)  Sitting - Dynamic: Poor (constant support)    ADL Intervention:                           Lower Body Dressing Assistance  Dressing Assistance: Total assistance(dependent)  Socks: Total assistance (dependent)(impaired cognition limiting command follow & decr ROM)  Leg Crossed Method Used: No  Position Performed: Long sitting on bed  Cues: Physical assistance; Tactile cues provided;Verbal cues provided;Visual cues provided    Toileting  Toileting Assistance: Total assistance(dependent)(Mod-Max A with rolling for bed-level toilet)  Bladder Hygiene: Total assistance (dependent)  Bowel Hygiene: Total assistance (dependent)  Clothing Management: Total assistance (dependent)  Cues: Verbal cues provided; Tactile cues provided;Visual cues provided  Adaptive Equipment: (bed rails for BUEs)    Cognitive Retraining  Orientation Retraining: Person;Place;Situation  Problem Solving: Awareness of environment; Identifying the problem  Executive Functions: Executing cognitive plans  Organizing/Sequencing: Breaking task down  Attention to Task: Single task  Maintains Attention For (Time): 1 minute  Following Commands: Follows one step commands/directions(~70% accuracy, mod reinforcement)  Safety/Judgement: Decreased awareness of environment;Decreased awareness of need for safety;Decreased awareness of need for assistance; Lack of insight into deficits  Cues: Tactile cues provided;Verbal cues provided;Visual cues provided    Therapeutic Exercises:   Sitting EOB with L posterior-lateral lean with Max A for sitting balance, unable to come to full sitting or have no assist to sit upright even with bedrail support    Pain:  None    Activity Tolerance:   Poor  Please refer to the flowsheet for vital signs taken during this treatment.     After treatment patient left in no apparent distress:   Supine in bed, Call bell within reach, Bed / chair alarm activated, and Side rails x 3    COMMUNICATION/COLLABORATION:   The patients plan of care was discussed with: Physical Therapist and Registered Nurse    JEROD Haney, OTR/L  Time Calculation: 23 mins

## 2019-12-13 NOTE — PROGRESS NOTES
Problem: Mobility Impaired (Adult and Pediatric)  Goal: *Acute Goals and Plan of Care (Insert Text)  Description  FUNCTIONAL STATUS PRIOR TO ADMISSION: Patient resides in assisted living and has dementia. Pt was unable to provide prior level of function due to dementia. HOME SUPPORT PRIOR TO ADMISSION: Pt lives in assisted living facility. Physical Therapy Goals  Revisited 12/10/2019, not met, remain appropriate, carry over        Physical Therapy Goals  Initiated 12/3/2019  1. Patient will move from supine to sit and sit to supine  in bed with supervision within 7 day(s). 2.  Patient will transfer from bed to chair and chair to bed with minimal assistance/contact guard assist using the least restrictive device within 7 day(s). 3.  Patient will perform sit to stand with minimal assistance/contact guard assist within 7 day(s). 4.  Patient will ambulate with minimal assistance/contact guard assist for 75 feet with the least restrictive device within 7 day(s). Outcome: Progressing Towards Goal   PHYSICAL THERAPY TREATMENT  Patient: Petra Burnham (59 y.o. male)  Date: 12/13/2019  Diagnosis: Cellulitis of right arm [L03.113]   <principal problem not specified>       Precautions: Fall, Skin  Chart, physical therapy assessment, plan of care and goals were reviewed. ASSESSMENT  Patient continues with skilled PT services and is progressing towards goals. Pt difficult to understand, yet very pleasant and agreeable to work with therapist. Pt performed supine LE exercises with mod A in prep for mobility. Pt then performed supine to sit toward L side with HOB elevated requiring max A. Upon sitting EOB, pt presented with excessive posterior trunk leaning requiring max A to maintain balance. Pt tolerated sitting EOB for approx 3 minutes providing cues to engage trunk muscles and lean anteriorly, yet pt continued to present with posterior leaning. Pt then began to fatigue requiring to return to supine.  Pt then repositioned for comfort. Current Level of Function Impacting Discharge (mobility/balance): max A for bed mobility and sitting balance     Other factors to consider for discharge: cognitive impairments, level of assistance required, unable to stand 2/2 poor sitting balance, decreased activity tolerance          PLAN :  Patient continues to benefit from skilled intervention to address the above impairments. Continue treatment per established plan of care. to address goals. Recommendation for discharge: (in order for the patient to meet his/her long term goals)  Therapy up to 5 days/week in SNF setting    This discharge recommendation:  Has been made in collaboration with the attending provider and/or case management    IF patient discharges home will need the following DME: hospital bed, mechanical lift, and wheelchair       SUBJECTIVE:   Patient stated oh, thank you.     OBJECTIVE DATA SUMMARY:   Critical Behavior:  Neurologic State: Alert  Orientation Level: Oriented to person, Disoriented to place, Disoriented to situation, Disoriented to time(increased time and intermittent A&O x0)  Cognition: Decreased attention/concentration, Decreased command following, Poor safety awareness  Safety/Judgement: Decreased awareness of environment, Decreased awareness of need for safety, Decreased awareness of need for assistance, Lack of insight into deficits  Functional Mobility Training:  Bed Mobility:     Supine to Sit: Maximum assistance;Assist x1;Additional time;Bed Modified  Sit to Supine: Maximum assistance;Assist x1;Additional time  Scooting: Maximum assistance;Assist x2        Transfers:  Unable to attempt due to poor sitting balance      Balance:  Sitting: Impaired; Without support  Sitting - Static: Poor (constant support)  Sitting - Dynamic: Poor (constant support)  Ambulation/Gait Training:         Therapeutic Exercises:   Pt performed supine ankle pumps and heel slides x 10      Activity Tolerance: Fair and requires rest breaks  Please refer to the flowsheet for vital signs taken during this treatment.     After treatment patient left in no apparent distress:   Supine in bed, Call bell within reach, Bed / chair alarm activated, Side rails x 3, and RN bedside     COMMUNICATION/COLLABORATION:   The patients plan of care was discussed with: Occupational Therapist and Registered Nurse    Carlos Eduardo Mar, PT, DPT   Time Calculation: 14 mins

## 2019-12-13 NOTE — PROGRESS NOTES
BELLA     1. Patient to transition to SNF when medically stable. Patient will need [employer] auth updated. Please follow up with [employer] at [phone]/ Auth #[id number] . PT notes are up to date but in need of updated OT notes. [facility] still willing to accept, however it was reported on yesterday that patient and or family has requested a referral to be sent to [facility]. CM notes patient has now transferred to [facility], will update unit CM.      Sonido Barahona MS

## 2019-12-13 NOTE — PROGRESS NOTES
TRANSFER - IN REPORT:    Verbal report received from [address], RN (name) on Agustin King  being received from [facility] (unit) for routine progression of care      Report consisted of patients Situation, Background, Assessment and   Recommendations(SBAR). Information from the following report(s) SBAR and Kardex was reviewed with the receiving nurse. Opportunity for questions and clarification was provided. Assessment completed upon patients arrival to unit and care assumed. Primary Nurse Yoana Zhong and Porter Harrison RN performed a dual skin assessment on this patient Impairment noted- see wound doc flow sheet  Oscar score is 13    Erythema to right arm. Noted scattered abrasions to the shins and the groin area. Scattered ecchymosis. Small open wound to sacrum-marathon and zguard paste applied. Call light in reach and bed in lowest position.

## 2019-12-13 NOTE — PALLIATIVE CARE DISCHARGE
Goals of Care/Treatment Preferences The Palliative Medicine team was consulted as part of your/your loved one's care in the hospital. Our team is a supportive service; we strive to relieve suffering and improve quality of life. We reviewed advance care planning information, which includes the following: 
Patient's [place] is[de-identified] Named in scanned ACP document(Son brought in [facility] 12/11/2019, copy placed in chart to be scanned to EMR) Confirm Advance Directive: Yes, on file Does the patient have other document types: Do Not Resuscitate Patient/Health Care Proxy Stated Goals: Prolong life We reviewed / discussed your code status as: DNR 
   Full Code means perform CPR in the event of cardiac arrest. 
    DNR means do NOT perform CPR in the event of cardiac arrest. 
    Partial Code means you have specific preferences, please discuss with your healthcare team. 
    Roly Jg means this issue was not addressed / resolved during your stay Medical Interventions: Limited additional interventions Other Instructions:  
Artificially Administered Nutrition: (did not address at time of visit) Because of the importance of this information, we are providing you with a printed copy to share with other healthcare providers after this hospitalization is complete.

## 2019-12-13 NOTE — PROGRESS NOTES
Patient transferred to 6 E.  CM spoke with patient's son and he desires to have patient go to [facility]. Multiple calls placed to [facility]) and Rosa Parson reinstated. Auth number obtained. CM called Jeovanny Prince and verified that [facility] had spoken with facility. Patient's son was at bedside and he was informed of updates. Patient's son is going to the facility to assist with completion of admission papers. CM requested  6:30pm transport. CM awaiting confirmation. CM received confirmation for 8pm transport. [facility]--[phone]. Assigned nurse to call report to [phone]. Communication to Patient/Family:  Met with patient and family and they are agreeable to the transition plan. The Plan for Transition of Care is related to the following treatment goals: d/c to [facility]    The Patient and/or patient representative was provided with a choice of provider and agrees  with the discharge plan. Yes [x] No []    A Freedom of choice list was provided with basic dialogue that supports the patient's individualized plan of care/goals and shares the quality data associated with the providers. Yes [x] No []    SNF/Rehab Transition:  Patient has been accepted to [facility]/Rehab and meets criteria for admission. Patient will transported by [facility] and expected to leave at 8pm.    Communication to SNF/Rehab:  Bedside RN, Su Ramírez, has been notified to update the transition plan to the facility and call report (phone number). Discharge information has been updated on the AVS. And communicated to facility via [employer], or CC link. Discharge instructions to be fax'd to facility at [facility] #). selected    Nursing Please include all hard scripts for controlled substances, med rec and dc summary, and AVS in packet.      Reviewed and confirmed with facility, [facility], can manage the patient care needs for the following:     [place] with (X) only those applicable:  Medication:  [x]Medications are available at the facility  []IV Antibiotics    []Controlled Substance  hard copies available sent. []Weekly Labs    Equipment:  []CPAP/BiPAP  []Wound Vacuum  []Ma or Urinary Device  []PICC/Central Line  []Nebulizer  []Ventilator    Treatment:  []Isolation (for MRSA, VRE, etc.)  []Surgical Drain Management  []Tracheostomy Care  []Dressing Changes  []Dialysis with transportation  []PEG Care  []Oxygen  []Daily Weights for Heart Failure    Dietary:  []Any diet limitations  []Tube Feedings   []Total Parenteral Management (TPN)    Financial Resources:  []Medicaid Application Completed    []UAI Completed and copy given to pt/family  and copy given to pt/family  []A screening has previously been completed. []Level II Completed    [] Private pay individual who will not become   financially eligible for Medicaid within 6 months from admission to a [address] [place] [address] facility. [] Individual refused to have screening conducted. []Medicaid Application Completed    [x]The screening denied because it was determined individual did not need/did not qualify for nursing facility level of care. [] Out of state residents seeking direct admission to a [address] facility. [] Individuals who are inpatients of an out of state hospital, or in state or out of state veterans/ hospital and seek direct admission to a [address] facility  [] Individuals who are pateints or residents of a state owned/operated facility that is licensed by [employer] ([employer]) and seek direct admission to [address] [place] [address]  [] A screening not required for enrollment in [address] [address] [address] services as set out in [address] [place] [address] 86-  [] [facility]) staff shall perform screenings of the [facility] clients. Advanced Care Plan:  [x]Surrogate Decision Maker of Care  []POA  []Communicated Code Status and copy sent.     Other:

## 2019-12-13 NOTE — PROGRESS NOTES
Pharmacist Note - Warfarin Dosing    Assessment/ Plan:  Warfarin 5 mg PO x 1 for INR 1.7 today. Pharmacy will continue to monitor daily and adjust therapy as indicated. Please contact the pharmacist at  for outpatient recommendations if needed.      Consult provided for this 81 y.o.male to manage warfarin for Atrial Fibrillation  INR Goal: 2 - 3  Home regimen/ tablet size: 4 mg daily  Drugs that may increase INR: Doxycycline, Prednisone  Drugs that may decrease INR: None  Other current anticoagulants/ drugs that may increase bleeding risk: Aspirin  Risk factors: Age > 65  Daily INR ordered: YES    Recent Labs     12/13/19  0419 12/12/19  0413 12/11/19  0744   INR 1.7* 1.5* 1.4*     Date               INR                  Dose  11/27  2.7  4 mg (took PTA)   11/28  2.6  4 mg   11/29  3  2.5 mg   11/30               2.7                  3 mg  12/01               4.2                  Held  12/02  4.4  Held  -- Consult discontinued - Reconsulted 12/04 --  12/04  3.6  Hold   12/05  2.9  3 mg  12/06  2.3   2.5 mg  12/07  2.0  3 mg  12/08  2.3  2.5 mg  12/9                2                     --dose not given (patient refused)  12/10             1.6                    4 mg  12/11             1.4                    4 mg  12/12  1.5  4 mg  12/13  1.7  5 mg

## 2023-03-23 NOTE — PROGRESS NOTES
Pharmacist Note - Warfarin Dosing  Consult provided for this 84 y.o.male to manage warfarin for Atrial Fibrillation    INR Goal: 2 - 3    Home regimen/ tablet size: 4 mg daily    Drugs that may increase INR: None  Drugs that may decrease INR: None  Other current anticoagulants/ drugs that may increase bleeding risk: Aspirin  Risk factors: Age > 65  Daily INR ordered: YES    Recent Labs     11/27/19  1942 11/27/19  1520   HGB  --  13.4   INR 2.7*  --      Date               INR                  Dose  11/27  2.7  4 mg (took PTA)                                                                                Assessment/ Plan:  No warfarin order needed, patient took dose PTA. Pharmacy will continue to monitor daily and adjust therapy as indicated. Please contact the pharmacist at  for outpatient recommendations if needed. Pt to CT

## 2023-07-27 NOTE — PROGRESS NOTES
Pt lethargic overnight, on BiPAP. Unable to perform proper education at this time d/t acute and chronic medical conditions. Laps, needles and instruments count was reported as correct.

## 2024-03-29 NOTE — PROGRESS NOTES
Bedside shift change report given to Will RN and Harinder Medina (oncoming nurse) by Stan Rodas (offgoing nurse). Report included the following information SBAR, Kardex and MAR. No protocol for requested medication.    Medication:    Disp Refills Start End    gabapentin (NEURONTIN) 300 MG capsule 180 capsule 0 3/5/2024 4/4/2024    Sig - Route: Take 2 capsules by mouth in the morning and 2 capsules at noon and 2 capsules in the evening. - Oral      Last office visit date: 2/21/24  Pharmacy: [id number] - [place], [place] - [address]    Order pended, routed to clinician for review.     Medication:    Disp Refills Start End    lisinopril (ZESTRIL) 10 MG tablet 30 tablet 1 1/24/2024 1/23/2025    Sig - Route: Take 1 tablet by mouth daily. - Oral     passed protocol.   Last office visit date: 2/21/24  Next appointment scheduled?: No;  transferred to scheduling to set up appointment. Appointment set up for 5/6/24.    Number of refills given: 1